# Patient Record
Sex: FEMALE | Employment: OTHER | URBAN - METROPOLITAN AREA
[De-identification: names, ages, dates, MRNs, and addresses within clinical notes are randomized per-mention and may not be internally consistent; named-entity substitution may affect disease eponyms.]

---

## 2017-02-13 NOTE — PROGRESS NOTES
"  SUBJECTIVE:                                                    Cristobal Darnell is a 41 year old female who presents to clinic today for the following health issues:    Rash     Onset: 1 year    Description:   Location: abdomen   Character: round, raised  Itching (Pruritis): YES    Progression of Symptoms:  same    Accompanying Signs & Symptoms:  Fever: no   Body aches or joint pain: no   Sore throat symptoms: no   Recent cold symptoms: no    History:   Previous similar rash: Yes     Precipitating factors:   Exposure to similar rash: no   New exposures: None   Recent travel: no     Alleviating factors:       Therapies Tried and outcome: hydrocortisone - family prescription     She has seen an outside head and neck clinic for TMJ pain, for which an MRI was performed and therapy recommended. She requests referral to a location closer to home.     She takes bupropion for chronic depression, and wants to continue taking multiple vitamin supplements by prescription.     ROS:  CONSTITUTIONAL: obesity   INTEGUMENTARY/SKIN: see HPI   MUSCULOSKELETAL: history of recurrent low back pain   PSYCHIATRIC: see above     OBJECTIVE:                                                    /73 (BP Location: Left arm, Patient Position: Chair, Cuff Size: Adult Regular)  Pulse 75  Temp 97.7  F (36.5  C) (Oral)  Ht 4' 11.75\" (1.518 m)  Wt 171 lb (77.6 kg)  SpO2 100%  Breastfeeding? No  BMI 33.68 kg/m2  Body mass index is 33.68 kg/(m^2).  GENERAL: alert, no distress and obese  MS: extremities normal- no gross deformities noted  SKIN: red fine pink papular rash on right lower abdomen looks more like an abrasion; no erythema in crease of pannus   PSYCH: mentation appears normal, affect normal/bright    Diagnostic Test Results:  Lab Results   Component Value Date    HDL 34 10/30/2013           ASSESSMENT/PLAN:                                                    (M26.609) TMJ (temporomandibular joint disorder)  (primary encounter " diagnosis)  Plan: [facility] PT, HAND, AND CHIROPRACTIC REFERRAL        Continue use of night splint as needed and stress management     (L30.4) Intertrigo  Comment: examination is unusual but chronicity is not consistent with appearance of abrasion   Plan: triamcinolone (KENALOG) 0.1 % cream,         DERMATOLOGY REFERRAL           (F32.1) Major depressive disorder, single episode, moderate degree (H)  Plan: buPROPion (WELLBUTRIN XL) 300 MG 24 hr tablet          (E53.8) Vitamin B 12 deficiency  Plan: cyanocobalamin (VITAMIN  B-12) 1000 MCG tablet          (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 She may purchase over-the-counter     (E63.9) Dietary deficiency  Plan: cholecalciferol (VITAMIN D3) 1000 UNIT tablet,         Multiple Vitamin (DAILY CHRISTIAN) TABS        Recommended healthy diet in ariella of MVI but patient prefers prescription; may purchase over-the-counter       See Patient Instructions    Shania Yeung MD  [facility]

## 2017-02-14 ENCOUNTER — OFFICE VISIT (OUTPATIENT)
Dept: FAMILY MEDICINE | Facility: CLINIC | Age: 42
End: 2017-02-14
Payer: COMMERCIAL

## 2017-02-14 VITALS
TEMPERATURE: 97.7 F | BODY MASS INDEX: 33.57 KG/M2 | SYSTOLIC BLOOD PRESSURE: 106 MMHG | WEIGHT: 171 LBS | OXYGEN SATURATION: 100 % | HEART RATE: 75 BPM | HEIGHT: 60 IN | DIASTOLIC BLOOD PRESSURE: 73 MMHG

## 2017-02-14 DIAGNOSIS — E53.8 VITAMIN B 12 DEFICIENCY: ICD-10-CM

## 2017-02-14 DIAGNOSIS — E63.9 DIETARY DEFICIENCY: ICD-10-CM

## 2017-02-14 DIAGNOSIS — E78.6 HDL LIPOPROTEIN DEFICIENCY: ICD-10-CM

## 2017-02-14 DIAGNOSIS — M26.609 TMJ (TEMPOROMANDIBULAR JOINT DISORDER): Primary | ICD-10-CM

## 2017-02-14 DIAGNOSIS — F32.1 MAJOR DEPRESSIVE DISORDER, SINGLE EPISODE, MODERATE DEGREE (H): ICD-10-CM

## 2017-02-14 DIAGNOSIS — L30.4 INTERTRIGO: ICD-10-CM

## 2017-02-14 PROCEDURE — 99213 OFFICE O/P EST LOW 20 MIN: CPT | Performed by: FAMILY MEDICINE

## 2017-02-14 RX ORDER — TRIAMCINOLONE ACETONIDE 1 MG/G
CREAM TOPICAL
Qty: 45 G | Refills: 1 | Status: SHIPPED | OUTPATIENT
Start: 2017-02-14 | End: 2018-08-09

## 2017-02-14 RX ORDER — BUPROPION HYDROCHLORIDE 300 MG/1
300 TABLET ORAL EVERY MORNING
Qty: 90 TABLET | Refills: 3 | Status: SHIPPED | OUTPATIENT
Start: 2017-02-14 | End: 2018-07-26

## 2017-02-14 RX ORDER — LANOLIN ALCOHOL/MO/W.PET/CERES
1000 CREAM (GRAM) TOPICAL DAILY
Qty: 90 TABLET | Refills: 3 | Status: SHIPPED | OUTPATIENT
Start: 2017-02-14 | End: 2018-07-26

## 2017-02-14 RX ORDER — CHLORAL HYDRATE 500 MG
CAPSULE ORAL
Qty: 90 EACH | Refills: 3 | Status: SHIPPED
Start: 2017-02-14 | End: 2018-08-09

## 2017-02-14 RX ORDER — MULTIVITAMIN
1 TABLET ORAL DAILY
Qty: 90 TABLET | Refills: 3 | Status: SHIPPED | OUTPATIENT
Start: 2017-02-14 | End: 2018-08-09

## 2017-02-14 ASSESSMENT — PATIENT HEALTH QUESTIONNAIRE - PHQ9: 5. POOR APPETITE OR OVEREATING: MORE THAN HALF THE DAYS

## 2017-02-14 ASSESSMENT — ANXIETY QUESTIONNAIRES
3. WORRYING TOO MUCH ABOUT DIFFERENT THINGS: MORE THAN HALF THE DAYS
5. BEING SO RESTLESS THAT IT IS HARD TO SIT STILL: MORE THAN HALF THE DAYS
6. BECOMING EASILY ANNOYED OR IRRITABLE: MORE THAN HALF THE DAYS
7. FEELING AFRAID AS IF SOMETHING AWFUL MIGHT HAPPEN: MORE THAN HALF THE DAYS
1. FEELING NERVOUS, ANXIOUS, OR ON EDGE: MORE THAN HALF THE DAYS
GAD7 TOTAL SCORE: 14
2. NOT BEING ABLE TO STOP OR CONTROL WORRYING: MORE THAN HALF THE DAYS

## 2017-02-14 NOTE — MR AVS SNAPSHOT
After Visit Summary   2/14/2017    Cristobal Darnell    MRN: [id number]           Patient Information     Date Of Birth          1975        Visit Information        Provider Department      2/14/2017 6:20 PM Shania Yeung MD [facility]        Today's Diagnoses     TMJ (temporomandibular joint disorder)    -  1    Intertrigo        Major depressive disorder, single episode, moderate degree (H)        Vitamin B 12 deficiency        HDL lipoprotein deficiency        Dietary deficiency          Care Instructions    [facility]    If you have any questions regarding to your visit please contact your care team:       Team Red:   Clinic Hours Telephone Number   Dr. Shania Hernandez, NP   7am-7pm  Monday - Thursday   7am-5pm  Fridays  [phone]  (Appointment scheduling available 24/7)    Questions about your visit?   Team Line  [phone]   [facility] [place] [facility] - 11am-9pm Monday-Friday Saturday-Sunday- 9am-5pm   [place] - 5pm-9pm Monday-Friday Saturday-Sunday- 9am-5pm  [phone] - [facility]  [phone] - [place]       What options do I have for visits at the clinic other than the traditional office visit?  To expand how we care for you, many of our providers are utilizing electronic visits (e-visits) and telephone visits, when medically appropriate, for interactions with their patients rather than a visit in the clinic.   We also offer nurse visits for many medical concerns. Just like any other service, we will bill your insurance company for this type of visit based on time spent on the phone with your provider. Not all insurance companies cover these visits. Please check with your medical insurance if this type of visit is covered. You will be responsible for any charges that are not paid by your insurance.      E-visits via Crunchbutton:  generally incur a $35.00 fee.  Telephone  visits:  Time spent on the phone: *charged based on time that is spent on the phone in increments of 10 minutes. Estimated cost:   5-10 mins $30.00   11-20 mins. $59.00   21-30 mins. $85.00     Use RentPostt (secure email communication and access to your chart) to send your primary care provider a message or make an appointment. Ask someone on your Team how to sign up for [email or web address].  For a Price Quote for your services, please call our [employer] Price Line at [phone].      As always, Thank you for trusting us with your health care needs!  Sandie ALVAREZ MA          Follow-ups after your visit        Additional Services     DERMATOLOGY REFERRAL       Your provider has referred you to: [facility]: [facility] Gisselle Ramírez [phone]   [email or web address]    Please be aware that coverage of these services is subject to the terms and limitations of your health insurance plan.  Call member services at your health plan with any benefit or coverage questions.      Please bring the following with you to your appointment:    (1) Any X-Rays, CTs or MRIs which have been performed.  Contact the facility where they were done to arrange for  prior to your scheduled appointment.    (2) List of current medications  (3) This referral request   (4) Any documents/labs given to you for this referral            [facility] PT, HAND, AND CHIROPRACTIC REFERRAL       **This order will print in the [facility] Scheduling Office**    Physical Therapy, Hand Therapy and Chiropractic Care are available through:    *[facility]  *[facility]  *[facility]    Call one number to schedule at any of the above locations: [phone].    Your provider has referred you to: As Indicated:      Indication/Reason for Referral: Temporomandibular Dysfunction  Onset of Illness:    Therapy Orders: Evaluate and Treat  Special Programs: None  Special Request: None    Sue Rogers      Additional  "Comments for the Therapist or Chiropractor:      Please be aware that coverage of these services is subject to the terms and limitations of your health insurance plan.  Call member services at your health plan with any benefit or coverage questions.      Please bring the following to your appointment:    *Your personal calendar for scheduling future appointments  *Comfortable clothing                  Follow-up notes from your care team     Return for physical.      Who to contact     If you have questions or need follow up information about today's clinic visit or your schedule please contact [facility] directly at [phone].  Normal or non-critical lab and imaging results will be communicated to you by AppFirsthart, letter or phone within 4 business days after the clinic has received the results. If you do not hear from us within 7 days, please contact the clinic through [email or web address] or phone. If you have a critical or abnormal lab result, we will notify you by phone as soon as possible.  Submit refill requests through [employer] or call your pharmacy and they will forward the refill request to us. Please allow 3 business days for your refill to be completed.          Additional Information About Your Visit        [employer] Information     [employer] lets you send messages to your doctor, view your test results, renew your prescriptions, schedule appointments and more. To sign up, go to [email or web address] . Click on \"Log in\" on the left side of the screen, which will take you to the Welcome page. Then click on \"Sign up Now\" on the right side of the page.     You will be asked to enter the access code listed below, as well as some personal information. Please follow the directions to create your username and password.     Your access code is: [email or web address]  Expires: 5/15/2017  6:55 PM     Your access code will  in 90 days. If you need help or a new code, please call your [place] clinic or [phone].   " "     Care EveryWhere ID     This is your Care EveryWhere ID. This could be used by other organizations to access your [facility] medical records  [id number]        Your Vitals Were     Pulse Temperature Height Pulse Oximetry Breastfeeding? BMI (Body Mass Index)    75 97.7  F (36.5  C) (Oral) 4' 11.75\" (1.518 m) 100% No 33.68 kg/m2       Blood Pressure from Last 3 Encounters:   02/14/17 106/73   07/21/16 125/77   06/07/16 112/70    Weight from Last 3 Encounters:   02/14/17 171 lb (77.6 kg)   07/21/16 162 lb 12.8 oz (73.8 kg)   06/07/16 163 lb (73.9 kg)              We Performed the Following     DERMATOLOGY REFERRAL     [facility] PT, HAND, AND CHIROPRACTIC REFERRAL          Today's Medication Changes          These changes are accurate as of: 2/14/17  6:55 PM.  If you have any questions, ask your nurse or doctor.               Start taking these medicines.        Dose/Directions    triamcinolone 0.1 % cream   Commonly known as:  KENALOG   Used for:  Intertrigo   Started by:  Shania Yeung MD        Apply to affected area sparingly twice daily as needed   Quantity:  45 g   Refills:  1         These medicines have changed or have updated prescriptions.        Dose/Directions    cholecalciferol 1000 UNIT tablet   Commonly known as:  vitamin D   This may have changed:  See the new instructions.   Used for:  Dietary deficiency   Changed by:  Shania Yeung MD        Dose:  1000 Units   Take 1 tablet (1,000 Units) by mouth daily   Quantity:  90 tablet   Refills:  3       cyanocobalamin 1000 MCG tablet   Commonly known as:  vitamin  B-12   This may have changed:  See the new instructions.   Used for:  Vitamin B 12 deficiency   Changed by:  Shania Yeung MD        Dose:  1000 mcg   Take 1 tablet (1,000 mcg) by mouth daily   Quantity:  90 tablet   Refills:  3       DAILY CHRISTIAN Tabs   This may have changed:  See the new instructions.   Used for:  Dietary deficiency   Changed by:  Shania Yeung MD        Dose:  1 tablet "   Take 1 tablet by mouth daily   Quantity:  90 tablet   Refills:  3       fish oil-omega-3 fatty acids 1000 MG capsule   This may have changed:  See the new instructions.   Used for:  HDL lipoprotein deficiency   Changed by:  Shania Yeung MD        TAKE ONE CAPSULE BY MOUTH ONCE DAILY   Quantity:  90 each   Refills:  3            Where to get your medicines      These medications were sent to [facility] - [address]  [address] [facility] [address]     Phone:  [phone]     buPROPion 300 MG 24 hr tablet    cholecalciferol 1000 UNIT tablet    cyanocobalamin 1000 MCG tablet    DAILY CHRISTIAN Tabs    fish oil-omega-3 fatty acids 1000 MG capsule    triamcinolone 0.1 % cream                Primary Care Provider Office Phone # Fax #    Shania Yeung [phone]       [address] [address]        Thank you!     Thank you for choosing [facility]  for your care. Our goal is always to provide you with excellent care. Hearing back from our patients is one way we can continue to improve our services. Please take a few minutes to complete the written survey that you may receive in the mail after your visit with us. Thank you!             Your Updated Medication List - Protect others around you: Learn how to safely use, store and throw away your medicines at www.disposemymeds.org.          This list is accurate as of: 2/14/17  6:55 PM.  Always use your most recent med list.                   Brand Name Dispense Instructions for use    buPROPion 300 MG 24 hr tablet    WELLBUTRIN XL    90 tablet    Take 1 tablet (300 mg) by mouth every morning       cholecalciferol 1000 UNIT tablet    vitamin D    90 tablet    Take 1 tablet (1,000 Units) by mouth daily       cyanocobalamin 1000 MCG tablet    vitamin  B-12    90 tablet    Take 1 tablet (1,000 mcg) by mouth daily       DAILY CHRISTIAN Tabs     90 tablet     Take 1 tablet by mouth daily       fish oil-omega-3 fatty acids 1000 MG capsule     90 each    TAKE ONE CAPSULE BY MOUTH ONCE DAILY       naproxen 500 MG tablet    NAPROSYN    60 tablet    Take 1 tablet (500 mg) by mouth 2 times daily as needed for moderate pain       triamcinolone 0.1 % cream    KENALOG    45 g    Apply to affected area sparingly twice daily as needed

## 2017-02-15 ASSESSMENT — PATIENT HEALTH QUESTIONNAIRE - PHQ9: SUM OF ALL RESPONSES TO PHQ QUESTIONS 1-9: 9

## 2017-02-15 ASSESSMENT — ANXIETY QUESTIONNAIRES: GAD7 TOTAL SCORE: 14

## 2017-02-15 NOTE — NURSING NOTE
"Chief Complaint   Patient presents with     Derm Problem       Initial /73 (BP Location: Left arm, Patient Position: Chair, Cuff Size: Adult Regular)  Pulse 75  Temp 97.7  F (36.5  C) (Oral)  Ht 4' 11.75\" (1.518 m)  Wt 171 lb (77.6 kg)  SpO2 100%  Breastfeeding? No  BMI 33.68 kg/m2 Estimated body mass index is 33.68 kg/(m^2) as calculated from the following:    Height as of this encounter: 4' 11.75\" (1.518 m).    Weight as of this encounter: 171 lb (77.6 kg).  Medication Reconciliation: complete   Sandie ALVAREZ MA      "

## 2017-02-15 NOTE — PATIENT INSTRUCTIONS
[facility]    If you have any questions regarding to your visit please contact your care team:       Team Red:   Clinic Hours Telephone Number   Dr. Shania Hernandez, NP   7am-7pm  Monday - Thursday   7am-5pm  Fridays  [phone]  (Appointment scheduling available 24/7)    Questions about your visit?   Team Line  [phone]   Urgent Care - [place] - 11am-9pm Monday-Friday Saturday-Sunday- 9am-5pm   [place] - 5pm-9pm Monday-Friday Saturday-Sunday- 9am-5pm  [phone] - [place]   [phone] - [place]       What options do I have for visits at the clinic other than the traditional office visit?  To expand how we care for you, many of our providers are utilizing electronic visits (e-visits) and telephone visits, when medically appropriate, for interactions with their patients rather than a visit in the clinic.   We also offer nurse visits for many medical concerns. Just like any other service, we will bill your insurance company for this type of visit based on time spent on the phone with your provider. Not all insurance companies cover these visits. Please check with your medical insurance if this type of visit is covered. You will be responsible for any charges that are not paid by your insurance.      E-visits via Convozine:  generally incur a $35.00 fee.  Telephone visits:  Time spent on the phone: *charged based on time that is spent on the phone in increments of 10 minutes. Estimated cost:   5-10 mins $30.00   11-20 mins. $59.00   21-30 mins. $85.00     Use DxNAt (secure email communication and access to your chart) to send your primary care provider a message or make an appointment. Ask someone on your Team how to sign up for Convozine.  For a Price Quote for your services, please call our [employer] Price Line at [phone].      As always, Thank you for trusting us with your health care needs!  Sandie ALVAREZ  MA

## 2017-02-16 ENCOUNTER — THERAPY VISIT (OUTPATIENT)
Dept: PHYSICAL THERAPY | Facility: CLINIC | Age: 42
End: 2017-02-16
Payer: COMMERCIAL

## 2017-02-16 DIAGNOSIS — M26.609 TMJ (TEMPOROMANDIBULAR JOINT DISORDER): Primary | ICD-10-CM

## 2017-02-16 DIAGNOSIS — M79.18 MYOFASCIAL MUSCLE PAIN: ICD-10-CM

## 2017-02-16 DIAGNOSIS — M54.2 CERVICALGIA: ICD-10-CM

## 2017-02-16 PROCEDURE — 97140 MANUAL THERAPY 1/> REGIONS: CPT | Mod: GP | Performed by: PHYSICAL THERAPIST

## 2017-02-16 PROCEDURE — 97161 PT EVAL LOW COMPLEX 20 MIN: CPT | Mod: GP | Performed by: PHYSICAL THERAPIST

## 2017-02-16 NOTE — MR AVS SNAPSHOT
After Visit Summary   2/16/2017    Cristobal Darnell    MRN: [id number]           Patient Information     Date Of Birth          1975        Visit Information        Provider Department      2/16/2017 1:30 PM Cornel Falcon, PT [facility]        Today's Diagnoses     TMJ (temporomandibular joint disorder)    -  1    Cervicalgia        Myofascial muscle pain           Follow-ups after your visit        Your next 10 appointments already scheduled     Feb 20, 2017  2:40 PM CST   HEMA Extremity with Tarun Scanlon, PT   [facility] [facility] ([facility] [place])    [address]  [address]  [place] [address]   [phone]            Feb 24, 2017  2:50 PM CST   HEMA Extremity with Tarun Scanlon PT   [facility] ([facility] [place])    [address]  [address]  [place] [address]   [phone]            Feb 27, 2017  2:40 PM CST   HEMA Extremity with Tarun Scanlon, PT   [facility] ([facility] [place])    [address]  [address]  [place] [address]   [phone]            Mar 02, 2017  1:50 PM CST   HEMA Extremity with Tarun Scanlon PT   [facility] ([facility] [place])    [address]  [address]  [place] [address]   [phone]              Who to contact     If you have questions or need follow up information about today's clinic visit or your schedule please contact [facility] directly at [phone].  Normal or non-critical lab and imaging results will be communicated to you by MyChart, letter or phone within 4 business days after the clinic has received the results. If you do not hear from us within 7 days, please contact the clinic through MyChart or phone. If you have a critical or abnormal lab result, we will notify you by phone as soon as  "possible.  Submit refill requests through [employer] or call your pharmacy and they will forward the refill request to us. Please allow 3 business days for your refill to be completed.          Additional Information About Your Visit        [email or web address] Information     [employer] lets you send messages to your doctor, view your test results, renew your prescriptions, schedule appointments and more. To sign up, go to [email or web address] . Click on \"Log in\" on the left side of the screen, which will take you to the Welcome page. Then click on \"Sign up Now\" on the right side of the page.     You will be asked to enter the access code listed below, as well as some personal information. Please follow the directions to create your username and password.     Your access code is: [email or web address]  Expires: 5/15/2017  6:55 PM     Your access code will  in 90 days. If you need help or a new code, please call your [place] clinic or [phone].        Care EveryWhere ID     This is your Care EveryWhere ID. This could be used by other organizations to access your [facility] medical records  [id number]         Blood Pressure from Last 3 Encounters:   17 106/73   16 125/77   16 112/70    Weight from Last 3 Encounters:   17 77.6 kg (171 lb)   16 73.8 kg (162 lb 12.8 oz)   16 73.9 kg (163 lb)              We Performed the Following     HC PT EVAL, LOW COMPLEXITY     HEMA INITIAL EVAL REPORT     MANUAL THER TECH,1+REGIONS,EA 15 MIN        Primary Care Provider Office Phone # Fax #    Shania Yeung [phone]       [address] [address]        Thank you!     Thank you for choosing [facility]  for your care. Our goal is always to provide you with excellent care. Hearing back from our patients is one way we can continue to improve our services. Please take a few minutes to complete the written survey that you " may receive in the mail after your visit with us. Thank you!             Your Updated Medication List - Protect others around you: Learn how to safely use, store and throw away your medicines at www.disposemymeds.org.          This list is accurate as of: 2/16/17  4:08 PM.  Always use your most recent med list.                   Brand Name Dispense Instructions for use    buPROPion 300 MG 24 hr tablet    WELLBUTRIN XL    90 tablet    Take 1 tablet (300 mg) by mouth every morning       cholecalciferol 1000 UNIT tablet    vitamin D    90 tablet    Take 1 tablet (1,000 Units) by mouth daily       cyanocobalamin 1000 MCG tablet    vitamin  B-12    90 tablet    Take 1 tablet (1,000 mcg) by mouth daily       DAILY CHRISTIAN Tabs     90 tablet    Take 1 tablet by mouth daily       fish oil-omega-3 fatty acids 1000 MG capsule     90 each    TAKE ONE CAPSULE BY MOUTH ONCE DAILY       naproxen 500 MG tablet    NAPROSYN    60 tablet    Take 1 tablet (500 mg) by mouth 2 times daily as needed for moderate pain       triamcinolone 0.1 % cream    KENALOG    45 g    Apply to affected area sparingly twice daily as needed

## 2017-02-16 NOTE — PROGRESS NOTES
"[facility] Initial Evaluation    Subjective:    Cristobal Darnell is a 41 year old female with a TMJ right condition.  Condition occurred with:  Insidious onset.  Condition occurred: for unknown reasons.  This is a chronic condition  5 years ago (or more) without injury. 1-2 years of limited opening. Date of MD order 2/14/17.    Patient reports pain:  TMJ right.  Radiates to:  Head (right side of neck and arm).  Pain is described as aching and is constant and reported as 7/10.  Associated symptoms:  Stiffness/limited opening (words \"sound different\"; \"extra skin\" on the inner cheek due to the ; right sided neck pain and headache). Pain is the same all the time.  Symptoms are exacerbated by chewing, clenching and stress (stretches from original referring MD; neck and arm pain are with stress or increased work/movement of arm) and relieved by heat (continued use of the exercises given by previous PT).  Since onset symptoms are unchanged (was improved when she was doing exercises).  Special tests:  CT scan.  Previous treatment includes physical therapy.  There was mild (but not sustained because she stopped doing exercises) improvement following previous treatment.  General health as reported by patient is fair.  Pertinent medical history includes:  None.  Medical allergies: no.  Other surgeries include:  None reported.  Current medications:  Other (vitamins).  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  Patient is working in normal job without restrictions.  Primary job tasks include:  Prolonged sitting and repetitive tasks.    Barriers include:  None as reported by the patient.    Red flags:  None as reported by the patient.                      Objective:    Standing Alignment:    Cervical/Thoracic:  Forward head  Shoulder/UE:  Rounded shoulders                                                        "   TMJ Evaluation  ROM:     AROM Cervical:    Flexion: WNL Overpressure: Pain:  Extension: min to mod loss Overpressure: Pain:  Left Side Bend: WNL Overpressure: Pain:  Right Side Bend: min loss Overpressure: Pain:  Left Rotation: min loss with lateral tilt at end range Overpressure: Pain:  Right Rotation: min loss Overpressure: Pain:  AROM TMJ:  Openin mm      Left Laterotrusion:  5 mm     Right Laterotrusion:  10 mm    Protrusion: 1 mm    Retrusion:  WNL    Associated Findings: Headaches:  Temporal (right)  Parafunctional Habits:    Bruxism:  Yes - per dentist  Mandibular Habits:  Resting head on hand  Chewing/Biting Nails:  No    Clenching:  Maybe - at least not relaxed    Caffeine:  Yes - tea 4 cups per day    Aerobic Exercise:  Not recently - planning on starting  Sleep Quality:  Good - R side with R arm under head  Neurological:  neuro exam not indicated and deferred      Previous Interventions:    Intraoral Appliances:  Night splint for the last 4 months    Palpation:      Left side tenderness not present at: Deep Masseter; Temporalis; Medial Pterygoid or Lateral Pterygoid  Right side tenderness present at:  Deep Masseter; Temporalis; Medial Pterygoid and Lateral Pterygoid    Movement Characteristics:        Deviations:  Straight line to the right      TMJ Findings:  Tmj findings: joint mobility normal on the left, unable to test on the right d/t pain/symptom irritability; MMT 5/5 all directions.      Tongue Scalloping:  Yes      Capsule Palpation:    Right side tenderness present at:  Lateral Capsule; Superior Capsule and Posterior Capsule                General     ROS    Assessment/Plan:      Patient is a 41 year old female with right side TMJ complaints.    Patient has the following significant findings with corresponding treatment plan.                Diagnosis 1:  TMD - R disc displacement without reduction; myofascial pain    Pain -  hot/cold therapy, manual therapy, education, directional  preference exercise and home program  Decreased ROM/flexibility - manual therapy, therapeutic exercise and home program  Decreased joint mobility - manual therapy and therapeutic exercise  Impaired muscle performance - neuro re-education and home program  Impaired posture - neuro re-education and home program    Therapy Evaluation Codes:   1) History comprised of:   Personal factors that impact the plan of care:      Past/current experiences.    Comorbidity factors that impact the plan of care are:      None.     Medications impacting care: None.  2) Examination of Body Systems comprised of:   Body structures and functions that impact the plan of care:      Cervical spine and TMJ.   Activity limitations that impact the plan of care are:      eating, talking, concentration.  3) Clinical presentation characteristics are:   Stable/Uncomplicated.  4) Decision-Making    Low complexity using standardized patient assessment instrument and/or measureable assessment of functional outcome.  Cumulative Therapy Evaluation is: Low complexity.    Previous and current functional limitations:  (See Goal Flow Sheet for this information)    Short term and Long term goals: (See Goal Flow Sheet for this information)     Communication ability:  Patient appears to be able to clearly communicate and understand verbal and written communication and follow directions correctly.  Treatment Explanation - The following has been discussed with the patient:   RX ordered/plan of care  Anticipated outcomes  Possible risks and side effects  This patient would benefit from PT intervention to resume normal activities.   Rehab potential is fair given the chronic nature of her symptoms and lack of improvement with previous PT treatment.    Frequency:  2 X week, once daily  Duration:  for 2 weeks tapering to 1 X a week over 4 weeks  Discharge Plan:  Achieve all LTG.  Independent in home treatment program.  Reach maximal therapeutic benefit.    Please  refer to the daily flowsheet for treatment today, total treatment time and time spent performing 1:1 timed codes.

## 2017-02-20 NOTE — PROGRESS NOTES
The risks, perceived benefits and potential complications (including but not limited to: dizziness/drowsiness, bleeding, infection, pain, damage to adjacent structures, failure to relieve symptoms) of dry needling were discussed with the patient. Questions were addressed and answered.  The patient elected to proceed. Verbal informed consent was obtained.

## 2017-02-24 ENCOUNTER — THERAPY VISIT (OUTPATIENT)
Dept: PHYSICAL THERAPY | Facility: CLINIC | Age: 42
End: 2017-02-24
Payer: COMMERCIAL

## 2017-02-24 DIAGNOSIS — M79.18 MYOFASCIAL MUSCLE PAIN: ICD-10-CM

## 2017-02-24 DIAGNOSIS — M54.2 CERVICALGIA: ICD-10-CM

## 2017-02-24 PROCEDURE — 97140 MANUAL THERAPY 1/> REGIONS: CPT | Mod: GP | Performed by: PHYSICAL THERAPIST

## 2017-02-24 NOTE — MR AVS SNAPSHOT
"              After Visit Summary   2/24/2017    Cristobal Darnell    MRN: [id number]           Patient Information     Date Of Birth          1975        Visit Information        Provider Department      2/24/2017 2:50 PM Tarun Scanlon PT [facility] [place] [facility]        Today's Diagnoses     Cervicalgia        Myofascial muscle pain           Follow-ups after your visit        Your next 10 appointments already scheduled     Feb 27, 2017  2:40 PM CST   HEMA Extremity with Tarun Scanlon PT   [facility] [place] [facility] ([facility] [place])    [address]  [address]  [place] [address]   [phone]            Mar 02, 2017  1:50 PM CST   HEMA Extremity with Tarun Scanlon PT   [facility] [place] [facility] ([facility] [place])    [address]  [place] [address]   [phone]              Who to contact     If you have questions or need follow up information about today's clinic visit or your schedule please contact [facility] [place] [facility] directly at [phone].  Normal or non-critical lab and imaging results will be communicated to you by BenchPrephart, letter or phone within 4 business days after the clinic has received the results. If you do not hear from us within 7 days, please contact the clinic through BenchPrephart or phone. If you have a critical or abnormal lab result, we will notify you by phone as soon as possible.  Submit refill requests through Tunepresto or call your pharmacy and they will forward the refill request to us. Please allow 3 business days for your refill to be completed.          Additional Information About Your Visit        MyChart Information     Tunep[employer]o lets you send messages to your doctor, view your test results, renew your prescriptions, schedule appointments and more. To sign up, go to [email or web address] . Click on \"Log in\" on the left side of the screen, which will " "take you to the Welcome page. Then click on \"Sign up Now\" on the right side of the page.     You will be asked to enter the access code listed below, as well as some personal information. Please follow the directions to create your username and password.     Your access code is: [email or web address]  Expires: 5/15/2017  6:55 PM     Your access code will  in 90 days. If you need help or a new code, please call your [facility] clinic or [phone].        Care EveryWhere ID     This is your Care EveryWhere ID. This could be used by other organizations to access your [facility] medical records  [id number]         Blood Pressure from Last 3 Encounters:   17 106/73   16 125/77   16 112/70    Weight from Last 3 Encounters:   17 77.6 kg (171 lb)   16 73.8 kg (162 lb 12.8 oz)   16 73.9 kg (163 lb)              We Performed the Following     MANUAL THER TECH,1+REGIONS,EA 15 MIN        Primary Care Provider Office Phone # Fax #    Shania Yeung [phone]       [address] [address]        Thank you!     Thank you for choosing [facility]  for your care. Our goal is always to provide you with excellent care. Hearing back from our patients is one way we can continue to improve our services. Please take a few minutes to complete the written survey that you may receive in the mail after your visit with us. Thank you!             Your Updated Medication List - Protect others around you: Learn how to safely use, store and throw away your medicines at www.disposemymeds.org.          This list is accurate as of: 17  3:26 PM.  Always use your most recent med list.                   Brand Name Dispense Instructions for use    buPROPion 300 MG 24 hr tablet    WELLBUTRIN XL    90 tablet    Take 1 tablet (300 mg) by mouth every morning       cholecalciferol 1000 UNIT tablet    vitamin D    90 tablet    Take 1 " tablet (1,000 Units) by mouth daily       cyanocobalamin 1000 MCG tablet    vitamin  B-12    90 tablet    Take 1 tablet (1,000 mcg) by mouth daily       DAILY CHRISTIAN Tabs     90 tablet    Take 1 tablet by mouth daily       fish oil-omega-3 fatty acids 1000 MG capsule     90 each    TAKE ONE CAPSULE BY MOUTH ONCE DAILY       naproxen 500 MG tablet    NAPROSYN    60 tablet    Take 1 tablet (500 mg) by mouth 2 times daily as needed for moderate pain       triamcinolone 0.1 % cream    KENALOG    45 g    Apply to affected area sparingly twice daily as needed

## 2017-02-24 NOTE — PROGRESS NOTES
Subjective:    HPI                    Objective:    System    Physical Exam    General     ROS    Assessment/Plan:      SUBJECTIVE  Subjective: Felt better after first needling treatment.   Current Pain level: 6/10   Changes in function:  None     Adverse reaction to treatment or activity:  None    OBJECTIVE  Objective: Mouth opening- still has a large deviation to the R and reports a heavy feeling     ASSESSMENT  Rabab continues to require intervention to meet STG and LTG's: PT  Patient is progressing as expected.  Response to therapy has shown an improvement in  pain level  Progress made towards STG/LTG?  None    PLAN  Continue DN for a few more visits    PTA/ATC plan:  N/A    Please refer to the daily flowsheet for treatment today, total treatment time and time spent performing 1:1 timed codes.

## 2017-03-02 ENCOUNTER — THERAPY VISIT (OUTPATIENT)
Dept: PHYSICAL THERAPY | Facility: CLINIC | Age: 42
End: 2017-03-02

## 2017-03-02 DIAGNOSIS — M54.2 CERVICALGIA: ICD-10-CM

## 2017-03-02 DIAGNOSIS — M79.18 MYOFASCIAL MUSCLE PAIN: ICD-10-CM

## 2017-03-02 NOTE — PROGRESS NOTES
Subjective:    HPI                    Objective:    System    Physical Exam    General     ROS    Assessment/Plan:      SUBJECTIVE  Subjective: Was more sore after last treatment.  Especially at the area where the needle was for the lateral pterygoid   Current Pain level: 6/10   Changes in function:  None     Adverse reaction to treatment or activity:  None    OBJECTIVE  Objective: Still no change in deviation to R with mouth opening.  Pt could control it with 2 fingers on chin and reported less pain     ASSESSMENT  Rabab continues to require intervention to meet STG and LTG's: PT  No change of symptoms has been noted this week.  Response to therapy has shown an improvement in  pain level  Progress made towards STG/LTG?  None    PLAN  Continue current treatment plan until patient demonstrates readiness to progress to higher level exercises.    PTA/ATC plan:  N/A    Please refer to the daily flowsheet for treatment today, total treatment time and time spent performing 1:1 timed codes.

## 2017-03-02 NOTE — MR AVS SNAPSHOT
"              After Visit Summary   3/2/2017    Cristobal Darnell    MRN: [id number]           Patient Information     Date Of Birth          1975        Visit Information        Provider Department      3/2/2017 1:50 PM Tarun Scanlon, PT [facility] [place] [facility]        Today's Diagnoses     Cervicalgia        Myofascial muscle pain           Follow-ups after your visit        Your next 10 appointments already scheduled     Mar 07, 2017  9:00 AM CST   HEMA Extremity with Tarun Scanlon PT   [facility] [place] [facility] ([facility] [place]    [address]  [place] [address]   [phone]            Mar 14, 2017 10:10 AM CDT   HEMA Extremity with Cornel Falcon PT   [facility] [place] [facility] ([facility] [place])    [address]  [place] [address]   [phone]              Who to contact     If you have questions or need follow up information about today's clinic visit or your schedule please contact [facility] [place] [facility] directly at [phone].  Normal or non-critical lab and imaging results will be communicated to you by skyrockithart, letter or phone within 4 business days after the clinic has received the results. If you do not hear from us within 7 days, please contact the clinic through skyrockithart or phone. If you have a critical or abnormal lab result, we will notify you by phone as soon as possible.  Submit refill requests through [employer] or call your pharmacy and they will forward the refill request to us. Please allow 3 business days for your refill to be completed.          Additional Information About Your Visit        MyChart Information     [employer] lets you send messages to your doctor, view your test results, renew your prescriptions, schedule appointments and more. To sign up, go to [email or web address] . Click on \"Log in\" on the left side of the screen, which will take you to " "the Welcome page. Then click on \"Sign up Now\" on the right side of the page.     You will be asked to enter the access code listed below, as well as some personal information. Please follow the directions to create your username and password.     Your access code is: [email or web address]  Expires: 5/15/2017  6:55 PM     Your access code will  in 90 days. If you need help or a new code, please call your [facility] or [phone].        Care EveryWhere ID     This is your Care EveryWhere ID. This could be used by other organizations to access your [facility] medical records  [id number]         Blood Pressure from Last 3 Encounters:   17 106/73   16 125/77   16 112/70    Weight from Last 3 Encounters:   17 77.6 kg (171 lb)   16 73.8 kg (162 lb 12.8 oz)   16 73.9 kg (163 lb)              Today, you had the following     No orders found for display       Primary Care Provider Office Phone # Fax #    Shania Yeung [phone]       [address] [address]        Thank you!     Thank you for choosing [facility]   for your care. Our goal is always to provide you with excellent care. Hearing back from our patients is one way we can continue to improve our services. Please take a few minutes to complete the written survey that you may receive in the mail after your visit with us. Thank you!             Your Updated Medication List - Protect others around you: Learn how to safely use, store and throw away your medicines at www.disposemymeds.org.          This list is accurate as of: 3/2/17  2:28 PM.  Always use your most recent med list.                   Brand Name Dispense Instructions for use    buPROPion 300 MG 24 hr tablet    WELLBUTRIN XL    90 tablet    Take 1 tablet (300 mg) by mouth every morning       cholecalciferol 1000 UNIT tablet    vitamin D    90 tablet    Take 1 tablet (1,000 Units) " by mouth daily       cyanocobalamin 1000 MCG tablet    vitamin  B-12    90 tablet    Take 1 tablet (1,000 mcg) by mouth daily       DAILY CHRISTIAN Tabs     90 tablet    Take 1 tablet by mouth daily       fish oil-omega-3 fatty acids 1000 MG capsule     90 each    TAKE ONE CAPSULE BY MOUTH ONCE DAILY       naproxen 500 MG tablet    NAPROSYN    60 tablet    Take 1 tablet (500 mg) by mouth 2 times daily as needed for moderate pain       triamcinolone 0.1 % cream    KENALOG    45 g    Apply to affected area sparingly twice daily as needed

## 2017-04-11 ENCOUNTER — THERAPY VISIT (OUTPATIENT)
Dept: PHYSICAL THERAPY | Facility: CLINIC | Age: 42
End: 2017-04-11
Payer: MEDICAID

## 2017-04-11 DIAGNOSIS — M54.2 CERVICALGIA: ICD-10-CM

## 2017-04-11 DIAGNOSIS — M79.18 MYOFASCIAL MUSCLE PAIN: ICD-10-CM

## 2017-04-11 PROCEDURE — 97140 MANUAL THERAPY 1/> REGIONS: CPT | Mod: GP | Performed by: PHYSICAL THERAPIST

## 2017-04-11 NOTE — MR AVS SNAPSHOT
"              After Visit Summary   2017    Cristobal Darnell    MRN: [id number]           Patient Information     Date Of Birth          1975        Visit Information        Provider Department      2017 1:30 PM Tarun Scanlon PT [facility] [place] [facility]        Today's Diagnoses     Cervicalgia        Myofascial muscle pain           Follow-ups after your visit        Who to contact     If you have questions or need follow up information about today's clinic visit or your schedule please contact [facility] [place] [facility] directly at [phone].  Normal or non-critical lab and imaging results will be communicated to you by PCN Technologyhart, letter or phone within 4 business days after the clinic has received the results. If you do not hear from us within 7 days, please contact the clinic through PCN Technologyhart or phone. If you have a critical or abnormal lab result, we will notify you by phone as soon as possible.  Submit refill requests through Zoji or call your pharmacy and they will forward the refill request to us. Please allow 3 business days for your refill to be completed.          Additional Information About Your Visit        MyChart Information     Zoji lets you send messages to your doctor, view your test results, renew your prescriptions, schedule appointments and more. To sign up, go to [email or web address] . Click on \"Log in\" on the left side of the screen, which will take you to the Welcome page. Then click on \"Sign up Now\" on the right side of the page.     You will be asked to enter the access code listed below, as well as some personal information. Please follow the directions to create your username and password.     Your access code is: [email or web address]  Expires: 5/15/2017  7:55 PM     Your access code will  in 90 days. If you need help or a new code, please call your [facility] clinic or [phone].        Care EveryWhere ID     This is your Care " EveryWhere ID. This could be used by other organizations to access your [facility] medical records  [id number]         Blood Pressure from Last 3 Encounters:   02/14/17 106/73   07/21/16 125/77   06/07/16 112/70    Weight from Last 3 Encounters:   02/14/17 77.6 kg (171 lb)   07/21/16 73.8 kg (162 lb 12.8 oz)   06/07/16 73.9 kg (163 lb)              We Performed the Following     MANUAL THER TECH,1+REGIONS,EA 15 MIN        Primary Care Provider Office Phone # Fax #    Shania Yeung [phone]       [address] [address]        Thank you!     Thank you for choosing [facility]  for your care. Our goal is always to provide you with excellent care. Hearing back from our patients is one way we can continue to improve our services. Please take a few minutes to complete the written survey that you may receive in the mail after your visit with us. Thank you!             Your Updated Medication List - Protect others around you: Learn how to safely use, store and throw away your medicines at www.disposemymeds.org.          This list is accurate as of: 4/11/17  2:29 PM.  Always use your most recent med list.                   Brand Name Dispense Instructions for use    buPROPion 300 MG 24 hr tablet    WELLBUTRIN XL    90 tablet    Take 1 tablet (300 mg) by mouth every morning       cholecalciferol 1000 UNIT tablet    vitamin D    90 tablet    Take 1 tablet (1,000 Units) by mouth daily       cyanocobalamin 1000 MCG tablet    vitamin  B-12    90 tablet    Take 1 tablet (1,000 mcg) by mouth daily       DAILY CHRISTIAN Tabs     90 tablet    Take 1 tablet by mouth daily       fish oil-omega-3 fatty acids 1000 MG capsule     90 each    TAKE ONE CAPSULE BY MOUTH ONCE DAILY       naproxen 500 MG tablet    NAPROSYN    60 tablet    Take 1 tablet (500 mg) by mouth 2 times daily as needed for moderate pain       triamcinolone 0.1 % cream    KENALOG     45 g    Apply to affected area sparingly twice daily as needed

## 2017-04-11 NOTE — PROGRESS NOTES
Subjective:    HPI                    Objective:    System    Physical Exam    General     ROS    Assessment/Plan:      PROGRESS  REPORT    Progress reporting period is from 2/24/17 to 4/11/17.       SUBJECTIVE  Subjective: No change since she was here last.  Has not been using her night splint.  No reason given for large gap in PT attendance    Current Pain level: 6/10.     Initial Pain level: 8/10.   Changes in function:  Yes (See Goal flowsheet attached for changes in current functional level)  Adverse reaction to treatment or activity: None    OBJECTIVE  Objective: Mouth opening- 29 mm with deviation to R.  Increased tone in R mid cervical paraspinals     ASSESSMENT/PLAN  Updated problem list and treatment plan: Diagnosis 1:  TMD with associated neck pain  Pain -  manual therapy, self management, education and home program  Decreased ROM/flexibility - manual therapy, therapeutic exercise and home program  Decreased function - therapeutic activities and home program  Instability -  Therapeutic Exercise  Neuromuscular Re-education  home program  STG/LTGs have been met or progress has been made towards goals:  Yes (See Goal flow sheet completed today.)  Assessment of Progress: The patient's progress has slowed.  Pt needs to attend PT more regularly if she expects to see a change.  Also needs to follow through on HEP and use of night splint  Self Management Plans:  Patient has been instructed in a home treatment program.  Cristobal continues to require the following intervention to meet STG and LTG's:  PT    Recommendations:  This patient would benefit from continued therapy.     Frequency:  1 X week, once daily  Duration:  for 4 weeks        Please refer to the daily flowsheet for treatment today, total treatment time and time spent performing 1:1 timed codes.

## 2017-06-29 ENCOUNTER — TELEPHONE (OUTPATIENT)
Dept: FAMILY MEDICINE | Facility: CLINIC | Age: 42
End: 2017-06-29

## 2017-06-29 NOTE — TELEPHONE ENCOUNTER
Reason for call: iud removal    Patient called regarding (reason for call): appointment  Additional comments: Please call patient to schedule    Phone number to reach patient:  Home number on file [phone] (home)    Best Time:anytime      Can we leave a detailed message on this number?  YES

## 2017-07-01 ENCOUNTER — HEALTH MAINTENANCE LETTER (OUTPATIENT)
Age: 42
End: 2017-07-01

## 2017-07-05 NOTE — TELEPHONE ENCOUNTER
Spoke to patient.  She stated she has already scheduled an appointment with another provider.  Nancy Riley,

## 2017-07-06 NOTE — PROGRESS NOTES
This patient did not return to Physical Therapy to complete their plan of care, thus, full DC status is unknown. Please refer to the progress note dated 4/11/17 for discharge information.   This bout of care ranged from 2/16/17 to 4/11/17.  Discharge patient from PT at this time.

## 2017-07-13 ENCOUNTER — TRANSFERRED RECORDS (OUTPATIENT)
Dept: HEALTH INFORMATION MANAGEMENT | Facility: CLINIC | Age: 42
End: 2017-07-13

## 2017-07-13 LAB
HPV ABSTRACT: NORMAL
PAP-ABSTRACT: NORMAL

## 2017-09-25 ENCOUNTER — OFFICE VISIT (OUTPATIENT)
Dept: FAMILY MEDICINE | Facility: CLINIC | Age: 42
End: 2017-09-25
Payer: COMMERCIAL

## 2017-09-25 VITALS
SYSTOLIC BLOOD PRESSURE: 112 MMHG | HEART RATE: 78 BPM | WEIGHT: 176 LBS | BODY MASS INDEX: 34.55 KG/M2 | OXYGEN SATURATION: 98 % | HEIGHT: 60 IN | DIASTOLIC BLOOD PRESSURE: 70 MMHG | TEMPERATURE: 97.6 F

## 2017-09-25 DIAGNOSIS — Z23 NEED FOR PROPHYLACTIC VACCINATION AND INOCULATION AGAINST INFLUENZA: ICD-10-CM

## 2017-09-25 DIAGNOSIS — Z12.31 VISIT FOR SCREENING MAMMOGRAM: ICD-10-CM

## 2017-09-25 DIAGNOSIS — Z13.6 CARDIOVASCULAR SCREENING; LDL GOAL LESS THAN 160: ICD-10-CM

## 2017-09-25 DIAGNOSIS — Z13.1 SCREENING FOR DIABETES MELLITUS: ICD-10-CM

## 2017-09-25 DIAGNOSIS — F32.1 MAJOR DEPRESSIVE DISORDER, SINGLE EPISODE, MODERATE DEGREE (H): Primary | ICD-10-CM

## 2017-09-25 PROCEDURE — 90686 IIV4 VACC NO PRSV 0.5 ML IM: CPT | Performed by: FAMILY MEDICINE

## 2017-09-25 PROCEDURE — 99213 OFFICE O/P EST LOW 20 MIN: CPT | Mod: 25 | Performed by: FAMILY MEDICINE

## 2017-09-25 PROCEDURE — 90471 IMMUNIZATION ADMIN: CPT | Performed by: FAMILY MEDICINE

## 2017-09-25 ASSESSMENT — ANXIETY QUESTIONNAIRES
6. BECOMING EASILY ANNOYED OR IRRITABLE: NEARLY EVERY DAY
1. FEELING NERVOUS, ANXIOUS, OR ON EDGE: MORE THAN HALF THE DAYS
GAD7 TOTAL SCORE: 18
2. NOT BEING ABLE TO STOP OR CONTROL WORRYING: MORE THAN HALF THE DAYS
7. FEELING AFRAID AS IF SOMETHING AWFUL MIGHT HAPPEN: NEARLY EVERY DAY
5. BEING SO RESTLESS THAT IT IS HARD TO SIT STILL: MORE THAN HALF THE DAYS
3. WORRYING TOO MUCH ABOUT DIFFERENT THINGS: NEARLY EVERY DAY

## 2017-09-25 ASSESSMENT — PATIENT HEALTH QUESTIONNAIRE - PHQ9
SUM OF ALL RESPONSES TO PHQ QUESTIONS 1-9: 16
5. POOR APPETITE OR OVEREATING: NEARLY EVERY DAY

## 2017-09-25 NOTE — NURSING NOTE
"Chief Complaint   Patient presents with     Dizziness     Recheck Medication       Initial /70 (BP Location: Right arm, Patient Position: Chair, Cuff Size: Adult Large)  Pulse 78  Temp 97.6  F (36.4  C)  Ht 4' 11.75\" (1.518 m)  Wt 176 lb (79.8 kg)  SpO2 98%  BMI 34.66 kg/m2 Estimated body mass index is 34.66 kg/(m^2) as calculated from the following:    Height as of this encounter: 4' 11.75\" (1.518 m).    Weight as of this encounter: 176 lb (79.8 kg).  Medication Reconciliation: complete   Jaimee Atwood MA      "

## 2017-09-25 NOTE — LETTER
My Depression Action Plan  Name: Cristobal Darnell   Date of Birth 1975  Date: 9/25/2017    My doctor: Shania Yeung   My clinic: [address]  [place] [address]  [phone]          GREEN    ZONE   Good Control    What it looks like:     Things are going generally well. You have normal up s and down s. You may even feel depressed from time to time, but bad moods usually last less than a day.   What you need to do:  1. Continue to care for yourself (see self care plan)  2. Check your depression survival kit and update it as needed  3. Follow your physician s recommendations including any medication.  4. Do not stop taking medication unless you consult with your physician first.           YELLOW         ZONE Getting Worse    What it looks like:     Depression is starting to interfere with your life.     It may be hard to get out of bed; you may be starting to isolate yourself from others.    Symptoms of depression are starting to last most all day and this has happened for several days.     You may have suicidal thoughts but they are not constant.   What you need to do:     1. Call your care team, your response to treatment will improve if you keep your care team informed of your progress. Yellow periods are signs an adjustment may need to be made.     2. Continue your self-care, even if you have to fake it!    3. Talk to someone in your support network    4. Open up your depression survival kit           RED    ZONE Medical Alert - Get Help    What it looks like:     Depression is seriously interfering with your life.     You may experience these or other symptoms: You can t get out of bed most days, can t work or engage in other necessary activities, you have trouble taking care of basic hygiene, or basic responsibilities, thoughts of suicide or death that will not go away, self-injurious behavior.     What you need to do:  1. Call your care team and request  a same-day appointment. If they are not available (weekends or after hours) call your local crisis line, emergency room or 911.      Electronically signed by: MILADYS BAKER, September 25, 2017    Depression Self Care Plan / Survival Kit    Self-Care for Depression  Here s the deal. Your body and mind are really not as separate as most people think.  What you do and think affects how you feel and how you feel influences what you do and think. This means if you do things that people who feel good do, it will help you feel better.  Sometimes this is all it takes.  There is also a place for medication and therapy depending on how severe your depression is, so be sure to consult with your medical provider and/ or Behavioral Health Consultant if your symptoms are worsening or not improving.     In order to better manage my stress, I will:    Exercise  Get some form of exercise, every day. This will help reduce pain and release endorphins, the  feel good  chemicals in your brain. This is almost as good as taking antidepressants!  This is not the same as joining a gym and then never going! (they count on that by the way ) It can be as simple as just going for a walk or doing some gardening, anything that will get you moving.      Hygiene   Maintain good hygiene (Get out of bed in the morning, Make your bed, Brush your teeth, Take a shower, and Get dressed like you were going to work, even if you are unemployed).  If your clothes don't fit try to get ones that do.    Diet  I will strive to eat foods that are good for me, drink plenty of water, and avoid excessive sugar, caffeine, alcohol, and other mood-altering substances.  Some foods that are helpful in depression are: complex carbohydrates, B vitamins, flaxseed, fish or fish oil, fresh fruits and vegetables.    Psychotherapy  I agree to participate in Individual Therapy (if recommended).    Medication  If prescribed medications, I agree to take them.  Missing doses  can result in serious side effects.  I understand that drinking alcohol, or other illicit drug use, may cause potential side effects.  I will not stop my medication abruptly without first discussing it with my provider.    Staying Connected With Others  I will stay in touch with my friends, family members, and my primary care provider/team.    Use your imagination  Be creative.  We all have a creative side; it doesn t matter if it s oil painting, sand castles, or mud pies! This will also kick up the endorphins.    Witness Beauty  (AKA stop and smell the roses) Take a look outside, even in mid-winter. Notice colors, textures. Watch the squirrels and birds.     Service to others  Be of service to others.  There is always someone else in need.  By helping others we can  get out of ourselves  and remember the really important things.  This also provides opportunities for practicing all the other parts of the program.    Humor  Laugh and be silly!  Adjust your TV habits for less news and crime-drama and more comedy.    Control your stress  Try breathing deep, massage therapy, biofeedback, and meditation. Find time to relax each day.     My support system    Clinic Contact:  Phone number:    Contact 1:  Phone number:    Contact 2:  Phone number:    Yazdanism/:  Phone number:    Therapist:  Phone number:    Local crisis center:    Phone number:    Other community support:  Phone number:

## 2017-09-25 NOTE — PATIENT INSTRUCTIONS
[facility]    If you have any questions regarding to your visit please contact your care team:       Team Purple:   Clinic Hours Telephone Number   Dr. Huong Kraus     7am-7pm  Monday - Thursday   7am-5pm  Fridays  [phone]  (Appointment scheduling available 24/7)    Questions about your Visit?   Team Line:  [phone]   Urgent Care - [place] and [facility] - 11am-9pm Monday-Friday Saturday-Sunday- 9am-5pm   [place] - 5pm-9pm Monday-Friday Saturday-Sunday- 9am-5pm  [phone] - [facility]  [phone] - [place]       What options do I have for visits at the clinic other than the traditional office visit?  To expand how we care for you, many of our providers are utilizing electronic visits (e-visits) and telephone visits, when medically appropriate, for interactions with their patients rather than a visit in the clinic.   We also offer nurse visits for many medical concerns. Just like any other service, we will bill your insurance company for this type of visit based on time spent on the phone with your provider. Not all insurance companies cover these visits. Please check with your medical insurance if this type of visit is covered. You will be responsible for any charges that are not paid by your insurance.      E-visits via [employer]:  generally incur a $35.00 fee.  Telephone visits:  Time spent on the phone: *charged based on time that is spent on the phone in increments of 10 minutes. Estimated cost:   5-10 mins $30.00   11-20 mins. $59.00   21-30 mins. $85.00     Use CaseReadert (secure email communication and access to your chart) to send your primary care provider a message or make an appointment. Ask someone on your Team how to sign up for [employer].  For a Price Quote for your services, please call our Consumer Price Line at [phone].  As always, Thank you for trusting us with your health care needs!
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
## 2017-09-25 NOTE — Clinical Note
Had pap smear 7/13/17 through [facility]. Can be seen on care everywhere. Please abstract  MILADYS BAKER M.D.

## 2017-09-25 NOTE — MR AVS SNAPSHOT
After Visit Summary   9/25/2017    Cristobal Darnell    MRN: [id number]           Patient Information     Date Of Birth          1975        Visit Information        Provider Department      9/25/2017 9:30 AM Huong Clifford MD [facility]        Today's Diagnoses     Major depressive disorder, single episode, moderate degree (H)    -  1    Visit for screening mammogram        Need for prophylactic vaccination and inoculation against influenza        CARDIOVASCULAR SCREENING; LDL GOAL LESS THAN 160        Screening for diabetes mellitus          Care Instructions    [facility]    If you have any questions regarding to your visit please contact your care team:       Team Purple:   Clinic Hours Telephone Number   Dr. Huong Kraus     7am-7pm  Monday - Thursday   7am-5pm  Fridays  [phone]  (Appointment scheduling available 24/7)    Questions about your Visit?   Team Line:  [phone]   [facility] [place] [facility] [place] 11am-9pm Monday-Friday Saturday-Sunday- 9am-5pm   [place] - 5pm-9pm Monday-Friday Saturday-Sunday- 9am-5pm  [phone] - [place]   [phone] - [place]       What options do I have for visits at the clinic other than the traditional office visit?  To expand how we care for you, many of our providers are utilizing electronic visits (e-visits) and telephone visits, when medically appropriate, for interactions with their patients rather than a visit in the clinic.   We also offer nurse visits for many medical concerns. Just like any other service, we will bill your insurance company for this type of visit based on time spent on the phone with your provider. Not all insurance companies cover these visits. Please check with your medical insurance if this type of visit is covered. You will be responsible for any charges that are not paid by your insurance.      E-visits via [place]:   "generally incur a $35.00 fee.  Telephone visits:  Time spent on the phone: *charged based on time that is spent on the phone in increments of 10 minutes. Estimated cost:   5-10 mins $30.00   11-20 mins. $59.00   21-30 mins. $85.00     Use Ludic Labshart (secure email communication and access to your chart) to send your primary care provider a message or make an appointment. Ask someone on your Team how to sign up for EchoPixelt.  For a Price Quote for your services, please call our [place] Line at [phone].  As always, Thank you for trusting us with your health care needs!              Follow-ups after your visit        Future tests that were ordered for you today     Open Future Orders        Priority Expected Expires Ordered    Lipid panel reflex to direct LDL Routine  11/25/2017 9/25/2017    Glucose Routine  11/25/2017 9/25/2017    MA SCREENING DIGITAL BILAT - Future  (s+30) Routine  9/21/2018 9/25/2017            Who to contact     If you have questions or need follow up information about today's clinic visit or your schedule please contact [facility] directly at [phone].  Normal or non-critical lab and imaging results will be communicated to you by MyChart, letter or phone within 4 business days after the clinic has received the results. If you do not hear from us within 7 days, please contact the clinic through Ludic Labshart or phone. If you have a critical or abnormal lab result, we will notify you by phone as soon as possible.  Submit refill requests through Zing or call your pharmacy and they will forward the refill request to us. Please allow 3 business days for your refill to be completed.          Additional Information About Your Visit        Ludic LabsharD1G Information     Zing lets you send messages to your doctor, view your test results, renew your prescriptions, schedule appointments and more. To sign up, go to [email or web address] . Click on \"Log in\" on the left side of the screen, " "which will take you to the Welcome page. Then click on \"Sign up Now\" on the right side of the page.     You will be asked to enter the access code listed below, as well as some personal information. Please follow the directions to create your username and password.     Your access code is: [email or web address]  Expires: 10/22/2017  2:56 PM     Your access code will  in 90 days. If you need help or a new code, please call your [place] clinic or [phone].        Care EveryWhere ID     This is your Care EveryWhere ID. This could be used by other organizations to access your [place] medical records  [id number]        Your Vitals Were     Pulse Temperature Height Pulse Oximetry BMI (Body Mass Index)       78 97.6  F (36.4  C) 4' 11.75\" (1.518 m) 98% 34.66 kg/m2        Blood Pressure from Last 3 Encounters:   17 112/70   17 106/73   16 125/77    Weight from Last 3 Encounters:   17 176 lb (79.8 kg)   17 171 lb (77.6 kg)   16 162 lb 12.8 oz (73.8 kg)              We Performed the Following     DEPRESSION ACTION PLAN (DAP)        Primary Care Provider Office Phone # Fax #    Shania Yeung [phone] [address] [facility] [address]        Equal Access to Services     [facility] AH: Hadii agustin mocko Soleobardo, waaxda luqadaha, qaybta kaalmada adekathleenyada, fan chicas. So [facility] [phone].    ATENCIÓN: Si habla español, tiene a wall disposición servicios gratuitos de asistencia lingüística. Caroline al [phone].    We comply with applicable federal civil rights laws and [place] laws. We do not discriminate on the basis of race, color, national origin, age, disability sex, sexual orientation or gender identity.            Thank you!     Thank you for choosing [facility]  for your care. Our goal is always to provide you with excellent care. Hearing back from our patients is one way we can continue to improve " our services. Please take a few minutes to complete the written survey that you may receive in the mail after your visit with us. Thank you!             Your Updated Medication List - Protect others around you: Learn how to safely use, store and throw away your medicines at www.disposemymeds.org.          This list is accurate as of: 9/25/17 10:13 AM.  Always use your most recent med list.                   Brand Name Dispense Instructions for use Diagnosis    buPROPion 300 MG 24 hr tablet    WELLBUTRIN XL    90 tablet    Take 1 tablet (300 mg) by mouth every morning    Major depressive disorder, single episode, moderate degree (H)       cholecalciferol 1000 UNIT tablet    vitamin D    90 tablet    Take 1 tablet (1,000 Units) by mouth daily    Dietary deficiency       cyanocobalamin 1000 MCG tablet    vitamin  B-12    90 tablet    Take 1 tablet (1,000 mcg) by mouth daily    Vitamin B 12 deficiency       DAILY CHRISTIAN Tabs     90 tablet    Take 1 tablet by mouth daily    Dietary deficiency       fish oil-omega-3 fatty acids 1000 MG capsule     90 each    TAKE ONE CAPSULE BY MOUTH ONCE DAILY    HDL lipoprotein deficiency       naproxen 500 MG tablet    NAPROSYN    60 tablet    Take 1 tablet (500 mg) by mouth 2 times daily as needed for moderate pain    Chronic low back pain, unspecified back pain laterality, with sciatica presence unspecified, Chondromalacia of both patellae       triamcinolone 0.1 % cream    KENALOG    45 g    Apply to affected area sparingly twice daily as needed    Intertrigo

## 2017-09-26 ENCOUNTER — RADIANT APPOINTMENT (OUTPATIENT)
Dept: MAMMOGRAPHY | Facility: CLINIC | Age: 42
End: 2017-09-26
Attending: FAMILY MEDICINE
Payer: COMMERCIAL

## 2017-09-26 DIAGNOSIS — Z12.31 VISIT FOR SCREENING MAMMOGRAM: ICD-10-CM

## 2017-09-26 DIAGNOSIS — Z13.6 CARDIOVASCULAR SCREENING; LDL GOAL LESS THAN 160: ICD-10-CM

## 2017-09-26 DIAGNOSIS — Z13.1 SCREENING FOR DIABETES MELLITUS: ICD-10-CM

## 2017-09-26 LAB
CHOLEST SERPL-MCNC: 168 MG/DL
GLUCOSE SERPL-MCNC: 89 MG/DL (ref 70–99)
HDLC SERPL-MCNC: 32 MG/DL
LDLC SERPL CALC-MCNC: 113 MG/DL
NONHDLC SERPL-MCNC: 136 MG/DL
TRIGL SERPL-MCNC: 113 MG/DL

## 2017-09-26 PROCEDURE — 36415 COLL VENOUS BLD VENIPUNCTURE: CPT | Performed by: FAMILY MEDICINE

## 2017-09-26 PROCEDURE — G0202 SCR MAMMO BI INCL CAD: HCPCS | Mod: TC

## 2017-09-26 PROCEDURE — 80061 LIPID PANEL: CPT | Performed by: FAMILY MEDICINE

## 2017-09-26 PROCEDURE — 82947 ASSAY GLUCOSE BLOOD QUANT: CPT | Performed by: FAMILY MEDICINE

## 2017-09-26 ASSESSMENT — ANXIETY QUESTIONNAIRES: GAD7 TOTAL SCORE: 18

## 2017-10-24 ENCOUNTER — TELEPHONE (OUTPATIENT)
Dept: FAMILY MEDICINE | Facility: CLINIC | Age: 42
End: 2017-10-24

## 2017-10-24 NOTE — TELEPHONE ENCOUNTER
Reason for Call:  Other call back    Detailed comments: patient called to discuss her lab results, please contact the patient to discuss further,     Phone Number Patient can be reached at: Home number on file [phone] (home)    Best Time: today    Can we leave a detailed message on this number? YES    Call taken on 10/24/2017 at 10:59 AM by Madisyn Macias

## 2018-01-18 ENCOUNTER — TRANSFERRED RECORDS (OUTPATIENT)
Dept: HEALTH INFORMATION MANAGEMENT | Facility: CLINIC | Age: 43
End: 2018-01-18

## 2018-03-06 ENCOUNTER — CARE COORDINATION (OUTPATIENT)
Dept: SURGERY | Facility: CLINIC | Age: 43
End: 2018-03-06

## 2018-03-08 ENCOUNTER — OFFICE VISIT (OUTPATIENT)
Dept: SURGERY | Facility: CLINIC | Age: 43
End: 2018-03-08

## 2018-03-08 ENCOUNTER — TELEPHONE (OUTPATIENT)
Dept: FAMILY MEDICINE | Facility: CLINIC | Age: 43
End: 2018-03-08

## 2018-03-08 ENCOUNTER — ALLIED HEALTH/NURSE VISIT (OUTPATIENT)
Dept: SURGERY | Facility: CLINIC | Age: 43
End: 2018-03-08

## 2018-03-08 VITALS
SYSTOLIC BLOOD PRESSURE: 118 MMHG | WEIGHT: 181.1 LBS | TEMPERATURE: 99.1 F | DIASTOLIC BLOOD PRESSURE: 75 MMHG | HEIGHT: 60 IN | HEART RATE: 79 BPM | BODY MASS INDEX: 35.56 KG/M2 | OXYGEN SATURATION: 99 %

## 2018-03-08 DIAGNOSIS — E66.812 CLASS 2 OBESITY WITHOUT SERIOUS COMORBIDITY WITH BODY MASS INDEX (BMI) OF 35.0 TO 35.9 IN ADULT, UNSPECIFIED OBESITY TYPE: Primary | ICD-10-CM

## 2018-03-08 RX ORDER — TOPIRAMATE 25 MG/1
TABLET, FILM COATED ORAL
Qty: 90 TABLET | Refills: 3 | Status: SHIPPED | OUTPATIENT
Start: 2018-03-08 | End: 2018-07-20

## 2018-03-08 NOTE — LETTER
3/8/2018          Cristobal Darnell  [address] [address]    [phone]    To whom it may concern,     I am the primary care provider for Cristobal Darnell and I support her having weight loss surgery.     Sincerely,        Shania Yeung MD  Dept of Family Practice

## 2018-03-08 NOTE — LETTER
"3/8/2018       RE: Cristobal Darnell  [address] [facility] [address]     Dear Colleague,    Thank you for referring your patient, Cristobal Darnell, to the [facility] SURGICAL WEIGHT MANAGEMENT at [facility]. Please see a copy of my visit note below.    New Bariatric Surgery Consultation Note    RE: Cristobal Darnell  MR#: [id number]  : 1975      Referring provider: No flowsheet data found.    Chief Complaint/Reason for visit: evaluation for possible weight loss surgery    Dear Shania Yeung MD (General),    I had the pleasure of seeing your patient, Cristobal Darnell, to evaluate her obesity and consider her for possible weight loss surgery. As you know, Cristobal Darnell is 42 year old.  She has a height of 4' 11.646\", a weight of 181 lbs 1.6 oz, and calculated Body mass index is 35.79 kg/(m^2).    HISTORY OF PRESENT ILLNESS:  Weight Loss History Reviewed with Patient 3/8/2018   How long have you been overweight? Since early childhood   What is the most that you have ever weighed? 184   What is the most weight you have lost? 5   I have tried the following methods to lose weight Exercise   I have tried the following weight loss medications? (Check all that apply) None   Have you ever had weight loss surgery? No       CO-MORBIDITIES OF OBESITY INCLUDE:     3/8/2018   I have the following co-morbidities associated with obesity: High Cholesterol, Sleep Apnea, Weight Bearing Joint Pain, Stress Incontinence   Do you use a CPAP? No       PAST MEDICAL HISTORY:  Past Medical History:   Diagnosis Date     Anxiety      Chondromalacia patella      Elevated uric acid 10/26/2011     GERD (gastroesophageal reflux disease)      HDL lipoprotein deficiency      Major depressive disorder, single episode, moderate degree (H)      Microscopic hematuria 10/1/2012     Migraine headaches 5/3/2010     Migraines      Plantar fasciitis      TMJ (temporomandibular joint disorder)      Vitamin B 12 " deficiency        PAST SURGICAL HISTORY:  Past Surgical History:   Procedure Laterality Date     NO HISTORY OF SURGERY         FAMILY HISTORY:   Family History   Problem Relation Age of Onset     DIABETES Mother      Hypertension Mother      C.A.D. Mother 58     MI      Depression Mother      CANCER Other      cousin, unknown      Asthma Son        SOCIAL HISTORY:   Social History Questions Reviewed With Patient 3/8/2018   Which best describes your employment status (select all that apply) I am disabled   Which best describes your marital status:    Do you have children? Yes   Who do you have in your support network that can be available to help you for the first 2 weeks after surgery? daugters   Who can you count on for support throughout your weight loss surgery journey? doughters   Can you afford 3 meals a day?  Yes   Can you afford 50-60 dollars a month for vitamins? Yes       HABITS:     3/8/2018   How often do you drink alcohol? Never   Have you or are you currently using street drugs or prescription strength medication for which you do not have a prescription for? Yes   Do you have a history of chemical dependency (alcohol or drug abuse)? No       PSYCHOLOGICAL HISTORY:   Psychological History Reviewed With Patient 3/8/2018   Have you ever attempted suicide? Never.   Have you had thoughts of suicide in the past year? No   Have you ever been hospitalized for mental illness or a suicide attempt? Never.   Do you have a history of chronic pain? Yes   Have you ever been diagnosed with fibromyalgia? No   Are you currently being treated for any of the following? (select all that apply) Depression   Are you currently seeing a therapist or counselor?  Yes   Are you currently seeing a psychiatrist? Yes       ROS:     3/8/2018   Skin:  Leg swelling   HEENT: Headaches, Dizziness/lightheadedness   Musculoskeletal: Joint Pain, Back pain, Swelling of legs, Arthritis   Cardiovascular: Chest pain, Shortness of breath  "with activity   Pulmonary: Shortness of breath with activity, Snoring   Gastrointestinal: Heartburn, Reflux, Constipation   Genitourinary: Stress incontinence (losing urine when coughing, sneezing, etc.)   Hematological: Family history of blood clots   Neurological: None of the above   Female only: None of the above       EATING BEHAVIORS:     3/8/2018   Have you or anyone else thought that you had an eating disorder? No   Do you currently binge eat (eat a large amount of food in a short time)? Yes   Are you an emotional eater? Yes   Do you get up to eat after falling asleep? No       EXERCISE:     3/8/2018   How often do you exercise? Never   What keeps you from being more active?  Pain, I have recently been sick, My ability to walk or move around is limited, Shortness of breath, Lack of Time, Too tired       MEDICATIONS:  Current Outpatient Prescriptions   Medication     triamcinolone (KENALOG) 0.1 % cream     cyanocobalamin (VITAMIN  B-12) 1000 MCG tablet     cholecalciferol (VITAMIN D3) 1000 UNIT tablet     fish oil-omega-3 fatty acids 1000 MG capsule     Multiple Vitamin (DAILY CHRISTIAN) TABS     buPROPion (WELLBUTRIN XL) 300 MG 24 hr tablet     naproxen (NAPROSYN) 500 MG tablet     No current facility-administered medications for this visit.        ALLERGIES:  Allergies   Allergen Reactions     Citalopram      Dizziness at 40 mg      Voltaren GI Disturbance     Zoloft      dizziness       LABS/IMAGING/MEDICAL RECORDS REVIEW:     PHYSICAL EXAM:  /75  Pulse 79  Temp 99.1  F (37.3  C) (Oral)  Ht 4' 11.65\"  Wt 181 lb 1.6 oz  SpO2 99%  BMI 35.79 kg/m2  General: NAD  Neurologic: A & O x 3, gait normal  Head: normocephalic, atraumatic  HEENT: PERRL, EOMI.   Respiratory: respirations unlabored  Abdomen: Obese, Soft NT ND   Extremities: No LE swelling   Skin: warm and dry.  No rashes on exposed skin  Psychiatric: Mentation and Affect appear normal      In summary, Cristobal Darnell has Class III obesity with a " body mass index of Body mass index is 35.79 kg/(m^2). kg/m2 and the comorbidities stated above. She completed an informational seminar and is interested in weight loss surgery . She is not a candidate due to BMI 35.79.  Her BMI has not been over 35 for the last two years as required by [facility].  Follow up with PCP regarding depression medication  See medical weight management MD for new consult.  Discussed possible topiramate and she will research and discuss with PCP  BMI 35 and co-morbidities include joint pain that is severe enough that it affects her daily activities  We also discussed the intragastric balloon and if she is interested call Denny Keller [phone] to schedule with Dr Gastelum to discusss  See RD today and monthly for 6 months is planning for possible sleeve gastrectomy  Start topiramate ramp to 75mg. Discussed risks and side effects  MEDICATION DISCUSSED AT THIS APPOINTMENT      Topiramate (Topamax) is a medication that is used most often to treat migraine headaches or for seizures. It has also been found to help with weight loss. Although it's not currently FDA approved for weight loss, it has been used safely for a number of years to help people who are carrying extra weight.     Just how topiramate helps with weight loss has not been exactly determined. However it seems to work on areas of the brain to quiet down signals related to eating.      Topiramate may make you:    >feel less interest in eating in between meals   >think less about food and eating   >find it easier to push the plate away   >find giving up pop easier    >have an easier time eating less    For some of our patients, the pills work right away. They feel and think quite differently about food. Other patients don't feel much of a change but find in fact they have lost weight! Like all weight loss medications, topiramate works best when you help it work.  This means:    1) Have less tempting high calorie (fattening) food around the  house or office    2) Have lower calorie food (fruits, vegetables,low fat meats and dairy) for snacks    3) Eat out only one time or less each week.   4) Eat your meals at a table with the TV or computer off.    Side-effects. Topiramate is generally well tolerated. The main side-effects we see are:   Tingling in hands,feet, or face (usually not very troublesome)   Mental confusion and word finding trouble (about 10% of patients have this.)     Feeling sleepy or a bit dopey- this goes away very soon after starting.    One of the dangers of topiramate is the possibility of birth defects--if you get pregnant when you are on it, there is the risk that your baby will be born with a cleft lip or palate.  If you are on topiramate and of child bearing age, you need to be on a reliable form of birth control or refrain from sexual intercourse.     Please refer to the pharmacy insert for more information on side-effects. Since many pharmacists are not familiar with the use of topiramate in weight loss, calling the clinic will get you the most accurate information on the use of this medication for weight loss.     In order to get refills of this or any medication we prescribe you must be seen in the medical weight mgmt clinic every 2-3 months. Please have your pharmacy fax a refill request to [phone].  Today in the office we discussed gastric sleeve surgery. Preoperative, perioperative, and postoperative processes, management, and follow up were addressed.  Risks and benefits were outlined including the risk of death, staple line leak (1-2%), PE, DVT, ulcer, worsening GERD, N/V, stricture, hernia, wound infection, weight regain, and vitamin deficiencies. I emphasized exercise and activity along with appropriate food choice as the main foundation for weight loss with surgery providing surgical reinforcement of this.  All questions were answered.  A goal sheet and support group handout were given to the patient.    Once the  patient has completed the requirements in their task list and there are no further recommendations, the pt will be allowed to see the surgeon of her choice for consultation on the laparoscopic gastric sleeve surgery. Patient verbalizes understanding of the process to surgery and expectations for the postoperative period including the need for lifelong lifestyle changes, vitamin supplementation, and laboratory monitoring.       I spent a total of 30 minutes face to face with Cristobal during today's office visit. Over 50% of this time was spent counseling the patient and/or coordinating care.        Again, thank you for allowing me to participate in the care of your patient.      Sincerely,    Stacey Lassiter PA-C

## 2018-03-08 NOTE — PATIENT INSTRUCTIONS
See Freda Elliott for bariatric psychological evaluation  Follow up with PCP regarding depression medication  See medical weight management MD for new consult.  Discussed possible topiramate and she will research and discuss with PCP  [email or web address] to watch online seminar  BMI 35 and co-morbidities include joint pain that is severe enough that it affects her daily activities  We also discussed the intragastric balloon and if she is interested call Denny Keller [phone] to schedule with Dr Gastelum to discusss  If cleared by psychological and interested in sleeve gastrectomy then need to see Dr Gastelum for PBS to discuss  See dietitian today and monthly for 6 months.  MEDICATION DISCUSSED AT THIS APPOINTMENT      Topiramate (Topamax) is a medication that is used most often to treat migraine headaches or for seizures. It has also been found to help with weight loss. Although it's not currently FDA approved for weight loss, it has been used safely for a number of years to help people who are carrying extra weight.     Just how topiramate helps with weight loss has not been exactly determined. However it seems to work on areas of the brain to quiet down signals related to eating.      Topiramate may make you:    >feel less interest in eating in between meals   >think less about food and eating   >find it easier to push the plate away   >find giving up pop easier    >have an easier time eating less    For some of our patients, the pills work right away. They feel and think quite differently about food. Other patients don't feel much of a change but find in fact they have lost weight! Like all weight loss medications, topiramate works best when you help it work.  This means:    1) Have less tempting high calorie (fattening) food around the house or office    2) Have lower calorie food (fruits, vegetables,low fat meats and dairy) for snacks    3) Eat out only one time or less each week.   4) Eat your meals at a table with  the TV or computer off.    Side-effects. Topiramate is generally well tolerated. The main side-effects we see are:   Tingling in hands,feet, or face (usually not very troublesome)   Mental confusion and word finding trouble (about 10% of patients have this.)     Feeling sleepy or a bit dopey- this goes away very soon after starting.    One of the dangers of topiramate is the possibility of birth defects--if you get pregnant when you are on it, there is the risk that your baby will be born with a cleft lip or palate.  If you are on topiramate and of child bearing age, you need to be on a reliable form of birth control or refrain from sexual intercourse.     Please refer to the pharmacy insert for more information on side-effects. Since many pharmacists are not familiar with the use of topiramate in weight loss, calling the clinic will get you the most accurate information on the use of this medication for weight loss.     In order to get refills of this or any medication we prescribe you must be seen in the medical weight mgmt clinic every 2-3 months. Please have your pharmacy fax a refill request to [phone].

## 2018-03-08 NOTE — PATIENT INSTRUCTIONS
"GOALS:  Relating To Eating:  Eat slowly (20-30 minutes per meal), chewing foods well (25 chews per bite/applesauce consistency)  9\" Plate method (1/2 plate non-starchy vegetables/fruit, 1/4 plate lean protein, 1/4 plate whole grain starch - no more than 1/2 cup carb/meal)  Avoid snacking    Relating to beverages:  Reduce caffeine/carbonation/calorie containing beverages  Separate fluids from meals by 30 minutes before, during, and after eating    Relating to dietary supplements:  Start a multivitamin containing iron daily    Relating to activity:  Increase activity as able    Relating to cravings:  Practice alternatives to emotion eating    Delaney Huerta RD, LD  Follow up in one month  If you need to schedule or reschedule with a dietitian please call [phone].    "

## 2018-03-08 NOTE — PROGRESS NOTES
"New Bariatric Surgery Consultation Note    RE: Cristobal Darnell  MR#: [id number]  : 1975      Referring provider: No flowsheet data found.    Chief Complaint/Reason for visit: evaluation for possible weight loss surgery    Dear Shania Yeung MD (General),    I had the pleasure of seeing your patient, Cristobal Darnell, to evaluate her obesity and consider her for possible weight loss surgery. As you know, Cristobal Darnell is 42 year old.  She has a height of 4' 11.646\", a weight of 181 lbs 1.6 oz, and calculated Body mass index is 35.79 kg/(m^2).    HISTORY OF PRESENT ILLNESS:  Weight Loss History Reviewed with Patient 3/8/2018   How long have you been overweight? Since early childhood   What is the most that you have ever weighed? 184   What is the most weight you have lost? 5   I have tried the following methods to lose weight Exercise   I have tried the following weight loss medications? (Check all that apply) None   Have you ever had weight loss surgery? No       CO-MORBIDITIES OF OBESITY INCLUDE:     3/8/2018   I have the following co-morbidities associated with obesity: High Cholesterol, Sleep Apnea, Weight Bearing Joint Pain, Stress Incontinence   Do you use a CPAP? No       PAST MEDICAL HISTORY:  Past Medical History:   Diagnosis Date     Anxiety      Chondromalacia patella      Elevated uric acid 10/26/2011     GERD (gastroesophageal reflux disease)      HDL lipoprotein deficiency      Major depressive disorder, single episode, moderate degree (H)      Microscopic hematuria 10/1/2012     Migraine headaches 5/3/2010     Migraines      Plantar fasciitis      TMJ (temporomandibular joint disorder)      Vitamin B 12 deficiency        PAST SURGICAL HISTORY:  Past Surgical History:   Procedure Laterality Date     NO HISTORY OF SURGERY         FAMILY HISTORY:   Family History   Problem Relation Age of Onset     DIABETES Mother      Hypertension Mother      C.A.D. Mother 58     MI      Depression Mother      " CANCER Other      cousin, unknown      Asthma Son        SOCIAL HISTORY:   Social History Questions Reviewed With Patient 3/8/2018   Which best describes your employment status (select all that apply) I am disabled   Which best describes your marital status:    Do you have children? Yes   Who do you have in your support network that can be available to help you for the first 2 weeks after surgery? daugters   Who can you count on for support throughout your weight loss surgery journey? doughters   Can you afford 3 meals a day?  Yes   Can you afford 50-60 dollars a month for vitamins? Yes       HABITS:     3/8/2018   How often do you drink alcohol? Never   Have you or are you currently using street drugs or prescription strength medication for which you do not have a prescription for? Yes   Do you have a history of chemical dependency (alcohol or drug abuse)? No       PSYCHOLOGICAL HISTORY:   Psychological History Reviewed With Patient 3/8/2018   Have you ever attempted suicide? Never.   Have you had thoughts of suicide in the past year? No   Have you ever been hospitalized for mental illness or a suicide attempt? Never.   Do you have a history of chronic pain? Yes   Have you ever been diagnosed with fibromyalgia? No   Are you currently being treated for any of the following? (select all that apply) Depression   Are you currently seeing a therapist or counselor?  Yes   Are you currently seeing a psychiatrist? Yes       ROS:     3/8/2018   Skin:  Leg swelling   HEENT: Headaches, Dizziness/lightheadedness   Musculoskeletal: Joint Pain, Back pain, Swelling of legs, Arthritis   Cardiovascular: Chest pain, Shortness of breath with activity   Pulmonary: Shortness of breath with activity, Snoring   Gastrointestinal: Heartburn, Reflux, Constipation   Genitourinary: Stress incontinence (losing urine when coughing, sneezing, etc.)   Hematological: Family history of blood clots   Neurological: None of the above   Female  "only: None of the above       EATING BEHAVIORS:     3/8/2018   Have you or anyone else thought that you had an eating disorder? No   Do you currently binge eat (eat a large amount of food in a short time)? Yes   Are you an emotional eater? Yes   Do you get up to eat after falling asleep? No       EXERCISE:     3/8/2018   How often do you exercise? Never   What keeps you from being more active?  Pain, I have recently been sick, My ability to walk or move around is limited, Shortness of breath, Lack of Time, Too tired       MEDICATIONS:  Current Outpatient Prescriptions   Medication     triamcinolone (KENALOG) 0.1 % cream     cyanocobalamin (VITAMIN  B-12) 1000 MCG tablet     cholecalciferol (VITAMIN D3) 1000 UNIT tablet     fish oil-omega-3 fatty acids 1000 MG capsule     Multiple Vitamin (DAILY CHRISTIAN) TABS     buPROPion (WELLBUTRIN XL) 300 MG 24 hr tablet     naproxen (NAPROSYN) 500 MG tablet     No current facility-administered medications for this visit.        ALLERGIES:  Allergies   Allergen Reactions     Citalopram      Dizziness at 40 mg      Voltaren GI Disturbance     Zoloft      dizziness       LABS/IMAGING/MEDICAL RECORDS REVIEW:     PHYSICAL EXAM:  /75  Pulse 79  Temp 99.1  F (37.3  C) (Oral)  Ht 4' 11.65\"  Wt 181 lb 1.6 oz  SpO2 99%  BMI 35.79 kg/m2  General: NAD  Neurologic: A & O x 3, gait normal  Head: normocephalic, atraumatic  HEENT: PERRL, EOMI.   Respiratory: respirations unlabored  Abdomen: Obese, Soft NT ND   Extremities: No LE swelling   Skin: warm and dry.  No rashes on exposed skin  Psychiatric: Mentation and Affect appear normal      In summary, Cristobal Darnell has Class III obesity with a body mass index of Body mass index is 35.79 kg/(m^2). kg/m2 and the comorbidities stated above. She completed an informational seminar and is interested in weight loss surgery . She is not a candidate due to BMI 35.79.  Her BMI has not been over 35 for the last two years as required by " Jennifer.  Follow up with PCP regarding depression medication  See medical weight management MD for new consult.  Discussed possible topiramate and she will research and discuss with PCP  BMI 35 and co-morbidities include joint pain that is severe enough that it affects her daily activities  We also discussed the intragastric balloon and if she is interested call Denny Keller [phone] to schedule with Dr Gastelum to discusss  See RD today and monthly for 6 months is planning for possible sleeve gastrectomy  Start topiramate ramp to 75mg. Discussed risks and side effects  MEDICATION DISCUSSED AT THIS APPOINTMENT      Topiramate (Topamax) is a medication that is used most often to treat migraine headaches or for seizures. It has also been found to help with weight loss. Although it's not currently FDA approved for weight loss, it has been used safely for a number of years to help people who are carrying extra weight.     Just how topiramate helps with weight loss has not been exactly determined. However it seems to work on areas of the brain to quiet down signals related to eating.      Topiramate may make you:    >feel less interest in eating in between meals   >think less about food and eating   >find it easier to push the plate away   >find giving up pop easier    >have an easier time eating less    For some of our patients, the pills work right away. They feel and think quite differently about food. Other patients don't feel much of a change but find in fact they have lost weight! Like all weight loss medications, topiramate works best when you help it work.  This means:    1) Have less tempting high calorie (fattening) food around the house or office    2) Have lower calorie food (fruits, vegetables,low fat meats and dairy) for snacks    3) Eat out only one time or less each week.   4) Eat your meals at a table with the TV or computer off.    Side-effects. Topiramate is generally well tolerated. The main side-effects  we see are:   Tingling in hands,feet, or face (usually not very troublesome)   Mental confusion and word finding trouble (about 10% of patients have this.)     Feeling sleepy or a bit dopey- this goes away very soon after starting.    One of the dangers of topiramate is the possibility of birth defects--if you get pregnant when you are on it, there is the risk that your baby will be born with a cleft lip or palate.  If you are on topiramate and of child bearing age, you need to be on a reliable form of birth control or refrain from sexual intercourse.     Please refer to the pharmacy insert for more information on side-effects. Since many pharmacists are not familiar with the use of topiramate in weight loss, calling the clinic will get you the most accurate information on the use of this medication for weight loss.     In order to get refills of this or any medication we prescribe you must be seen in the medical weight mgmt clinic every 2-3 months. Please have your pharmacy fax a refill request to [phone].  Today in the office we discussed gastric sleeve surgery. Preoperative, perioperative, and postoperative processes, management, and follow up were addressed.  Risks and benefits were outlined including the risk of death, staple line leak (1-2%), PE, DVT, ulcer, worsening GERD, N/V, stricture, hernia, wound infection, weight regain, and vitamin deficiencies. I emphasized exercise and activity along with appropriate food choice as the main foundation for weight loss with surgery providing surgical reinforcement of this.  All questions were answered.  A goal sheet and support group handout were given to the patient.    Once the patient has completed the requirements in their task list and there are no further recommendations, the pt will be allowed to see the surgeon of her choice for consultation on the laparoscopic gastric sleeve surgery. Patient verbalizes understanding of the process to surgery and  expectations for the postoperative period including the need for lifelong lifestyle changes, vitamin supplementation, and laboratory monitoring.       Sincerely,      Stacey Lassiter PA-C    I spent a total of 30 minutes face to face with Cristobal during today's office visit. Over 50% of this time was spent counseling the patient and/or coordinating care.

## 2018-03-08 NOTE — MR AVS SNAPSHOT
After Visit Summary   3/8/2018    Cristobal Darnell    MRN: [id number]           Patient Information     Date Of Birth          1975        Visit Information        Provider Department      3/8/2018 12:45 PM Stacey Lassiter PA-C [facility]        Care Instructions    See Freda Elliott for bariatric psychological evaluation  Follow up with PCP regarding depression medication  See medical weight management MD for new consult.  Discussed possible topiramate and she will research and discuss with PCP  [email or web address] to watch online seminar  BMI 35 and co-morbidities include joint pain that is severe enough that it affects her daily activities  We also discussed the intragastric balloon and if she is interested call Denny Keller [phone] to schedule with Dr Gastelum to discusss  If cleared by psychological and interested in sleeve gastrectomy then need to see Dr Gastelum for PBS to discuss  See dietitian today and monthly for 6 months.  MEDICATION DISCUSSED AT THIS APPOINTMENT      Topiramate (Topamax) is a medication that is used most often to treat migraine headaches or for seizures. It has also been found to help with weight loss. Although it's not currently FDA approved for weight loss, it has been used safely for a number of years to help people who are carrying extra weight.     Just how topiramate helps with weight loss has not been exactly determined. However it seems to work on areas of the brain to quiet down signals related to eating.      Topiramate may make you:    >feel less interest in eating in between meals   >think less about food and eating   >find it easier to push the plate away   >find giving up pop easier    >have an easier time eating less    For some of our patients, the pills work right away. They feel and think quite differently about food. Other patients don't feel much of a change but find in fact they have lost weight! Like all weight loss  medications, topiramate works best when you help it work.  This means:    1) Have less tempting high calorie (fattening) food around the house or office    2) Have lower calorie food (fruits, vegetables,low fat meats and dairy) for snacks    3) Eat out only one time or less each week.   4) Eat your meals at a table with the TV or computer off.    Side-effects. Topiramate is generally well tolerated. The main side-effects we see are:   Tingling in hands,feet, or face (usually not very troublesome)   Mental confusion and word finding trouble (about 10% of patients have this.)     Feeling sleepy or a bit dopey- this goes away very soon after starting.    One of the dangers of topiramate is the possibility of birth defects--if you get pregnant when you are on it, there is the risk that your baby will be born with a cleft lip or palate.  If you are on topiramate and of child bearing age, you need to be on a reliable form of birth control or refrain from sexual intercourse.     Please refer to the pharmacy insert for more information on side-effects. Since many pharmacists are not familiar with the use of topiramate in weight loss, calling the clinic will get you the most accurate information on the use of this medication for weight loss.     In order to get refills of this or any medication we prescribe you must be seen in the medical weight mgmt clinic every 2-3 months. Please have your pharmacy fax a refill request to [phone].          Follow-ups after your visit        Who to contact     Please call your clinic at [phone] to:    Ask questions about your health    Make or cancel appointments    Discuss your medicines    Learn about your test results    Speak to your doctor            Additional Information About Your Visit        [employer] Information     [employer] is an electronic gateway that provides easy, online access to your medical records. With [employer], you can request a clinic appointment, read your test  "results, renew a prescription or communicate with your care team.     To sign up for [employer] visit the website at [email or web address]   You will be asked to enter the access code listed below, as well as some personal information. Please follow the directions to create your username and password.     Your access code is: [email or web address]  Expires: 2018  2:08 PM     Your access code will  in 90 days. If you need help or a new code, please contact your [facility] or call [phone] for assistance.        Care EveryWhere ID     This is your Care EveryWhere ID. This could be used by other organizations to access your [facility] medical records  [id number]        Your Vitals Were     Pulse Temperature Height Pulse Oximetry BMI (Body Mass Index)       79 99.1  F (37.3  C) (Oral) 4' 11.65\" 99% 35.79 kg/m2        Blood Pressure from Last 3 Encounters:   18 118/75   17 112/70   17 106/73    Weight from Last 3 Encounters:   18 181 lb 1.6 oz   17 176 lb   17 171 lb              Today, you had the following     No orders found for display       Primary Care Provider Office Phone # Fax #    Shania Yeung [phone] [address] [facility] [address]        Equal Access to Services     TARAS TORRES AH: Hadii agustin ku hadasho Soomaali, waaxda luqadaha, qaybta kaalmada adeegyada, fan wiley . So [facility] [phone].    ATENCIÓN: Si habla español, tiene a wall disposición servicios gratuitos de asistencia lingüística. Caroline al [phone].    We comply with applicable federal civil rights laws and [place] laws. We do not discriminate on the basis of race, color, national origin, age, disability, sex, sexual orientation, or gender identity.            Thank you!     Thank you for choosing [facility]  for your care. Our goal is always to provide you with excellent care. " Hearing back from our patients is one way we can continue to improve our services. Please take a few minutes to complete the written survey that you may receive in the mail after your visit with us. Thank you!             Your Updated Medication List - Protect others around you: Learn how to safely use, store and throw away your medicines at www.disposemymeds.org.          This list is accurate as of 3/8/18  1:36 PM.  Always use your most recent med list.                   Brand Name Dispense Instructions for use Diagnosis    buPROPion 300 MG 24 hr tablet    WELLBUTRIN XL    90 tablet    Take 1 tablet (300 mg) by mouth every morning    Major depressive disorder, single episode, moderate degree (H)       cholecalciferol 1000 UNIT tablet    vitamin D3    90 tablet    Take 1 tablet (1,000 Units) by mouth daily    Dietary deficiency       cyanocobalamin 1000 MCG tablet    vitamin  B-12    90 tablet    Take 1 tablet (1,000 mcg) by mouth daily    Vitamin B 12 deficiency       DAILY CHRISTIAN Tabs     90 tablet    Take 1 tablet by mouth daily    Dietary deficiency       fish oil-omega-3 fatty acids 1000 MG capsule     90 each    TAKE ONE CAPSULE BY MOUTH ONCE DAILY    HDL lipoprotein deficiency       naproxen 500 MG tablet    NAPROSYN    60 tablet    Take 1 tablet (500 mg) by mouth 2 times daily as needed for moderate pain    Chronic low back pain, unspecified back pain laterality, with sciatica presence unspecified, Chondromalacia of both patellae       triamcinolone 0.1 % cream    KENALOG    45 g    Apply to affected area sparingly twice daily as needed    Intertrigo

## 2018-03-08 NOTE — TELEPHONE ENCOUNTER
Panel Management Review      Patient has the following on her problem list:     Depression / Dysthymia review    Measure:  Needs PHQ-9 score of 4 or less during index window.  Administer PHQ-9 and if score is 5 or more, send encounter to provider for next steps.    5   7 month window range: Index Date: 09/25/2017, Index PHQ9: 16, FU Start Date:02/25/2018, FU End Date:04/25/2018    PHQ-9 SCORE 2/10/2016 2/14/2017 9/25/2017   Total Score - - -   Total Score 11 9 16       If PHQ-9 recheck is 5 or more, route to provider for next steps.    Patient is due for:  PHQ9      Composite cancer screening  Chart review shows that this patient is due/due soon for the following None  Summary:    Patient is due/failing the following:   PHQ9    Action needed:   Patient needs to do PHQ9.    Type of outreach:    Phone, spoke to patient.  Completed PHQ9 over the phone. PHQ scored: 18 and scheduled appointment with Dr. Yeung on 03/21/2018 for Depression Check.    Questions for provider review:    None                                                                                                                                    Chelsie Pittman MA       Chart routed to none .

## 2018-03-08 NOTE — TELEPHONE ENCOUNTER
"----- Message from Stacey Lassiter PA-C sent at 3/8/2018  1:26 PM CST -----  Dear Primary Care Provider:    Thank you for coordinating with us to evaluate your patient for possible bariatric surgery. It is important to us that the primary care provider is aware of their patients' desire to have weight loss surgery and is supportive.    If you have not already submitted a referral or clinic note indicating your support for this patient to have weight loss surgery please enter a letter of support via Epic prior to the patient's next visit in our clinic.      Sample letter:    \"Dear [facility],    I am the primary care provider for (patient name) and I support (him/her) having weight loss surgery.    Sincerely,    (provider name)\"      If patient meets criteria and clearances for surgery, we will submit to the insurance company for insurance approval and coordinate the needed appointments with our department.    If you have any questions, feel free to contact our pre-surgery nurse coordinator, Mehnaz Wiggins RN at ph #: [phone].     Sincerely,    Dr. Garcia Gastelum,   Dr. Jovon Marrero,  Stacey Lassiter PA-C  [facility]  [address], [address], [facility] [address]  Website: [email or web address]    Please fax the letter to [phone] or route to Mehnaz Wiggins via [employer].    "

## 2018-03-08 NOTE — PROGRESS NOTES
"New Bariatric Nutrition Consultation Note    Reason For Visit: Nutrition Assessment    Cristobal Darnell is a 42 year old presenting today for new bariatric nutrition consult.   Pt is interested in laparoscopic sleeve gastrectomy with Dr. Gastelum.  This is pt's first of 6 required nutrition visits prior to surgery. Pt referred by Stacey HERNANDEZ.     She is interested in having weight loss surgery for the following reasons:  Joint pain unable to keep the weight off on her own    Support System Reviewed With Patient 3/8/2018   Who do you have in your support network that can be available to help you for the first 2 weeks after surgery? daugters   Who can you count on for support throughout your weight loss surgery journey? tylor       ANTHROPOMETRICS:  Estimated body mass index is 35.79 kg/(m^2) as calculated from the following:    Height as of an earlier encounter on 3/8/18: 1.515 m (4' 11.65\").    Weight as of an earlier encounter on 3/8/18: 82.1 kg (181 lb 1.6 oz).    Required weight loss goal pre-op: TBD       3/8/2018   I have tried the following methods to lose weight Exercise       Weight Loss Questions Reviewed With Patient 3/8/2018   How long have you been overweight? Since early childhood       NUTRITION HISTORY:  Recall Diet Questions Reviewed With Patient 3/8/2018   Describe what you typically consume for breakfast (typical or most recent): eggs and cheese two toast lepna and fol(cheese and beans)   Describe what you typically consume for lunch (typical or most recent): rice with chicken or fish   Describe what you typically consume for supper (typical or most recent): toast with cheese and tea(with honey)   Describe what you typically consume as snacks (typical or most recent): Banana, grab whatever is in the house- crackers with PB, chips, ice cream   How many ounces of water, or other low calorie drinks, do you drink daily (8 oz=1 glass)? 24 oz   How many ounces of caffeine (coffee, tea, pop) do you " drink daily (8 oz=1 glass)? 24 oz 1 cup coffee with creamer 3-4 6oz cups of tea with honey   How many ounces of carbonated (pop, beer, sparkling water) drinks do you drinky daily (8 oz=1 glass)? Coke three times per week   How many ounces of juice, pop, sweet tea, sports drinks, protein drinks, other sweetened drinks, do you drink daily (8 oz=1 glass)?    How often do you drink alcohol? Never       Eating Habits 3/8/2018   Do you have any dietary restrictions? No   Do you currently binge eat (eat a large amount of food in a short time)? Yes   Are you an emotional eater? Yes   Do you get up to eat after falling asleep? No   What foods do you crave? sweet       ADDITIONAL INFORMATION:    Dining Out History Reviewed With Patient 3/8/2018   How often do you dine out? A couple of times a week.   Where do you dine out? (select all that apply) fast food chains   What types of food do you order when you dine out? rice with meat       Physical Activity Reviewed With Patient 3/8/2018   How often do you exercise? Never   What keeps you from being more active?  Pain, I have recently been sick, My ability to walk or move around is limited, Shortness of breath, Lack of Time, Too tired       NUTRITION DIAGNOSIS:  Obesity r/t long history of self-monitoring deficit and excessive energy intake aeb BMI >30.    INTERVENTION:  Intervention Provided/Education Provided on post-op diet guidelines, vitamins/minerals essential post-operatively, GI anatomy of bariatric surgeries, ways to help prepare for post-op diet guidelines pre-operatively, portion/calorie-control, mindful eating and sources of protein.  Provided pt with list of goals RD contact information.      Questions Reviewed With Patient 3/8/2018   How ready are you to make changes regarding your weight? Number 1 = Not ready at all to make changes up to 10 = very ready. 7   How confident are you that you can change? 1 = Not confident that you will be successful making changes up  "to 10 = very confident. 8       Patient Understanding: fair-good  Expected Compliance: fair-good    GOALS:  Relating To Eating:  Eat slowly (20-30 minutes per meal), chewing foods well (25 chews per bite/applesauce consistency)  9\" Plate method (1/2 plate non-starchy vegetables/fruit, 1/4 plate lean protein, 1/4 plate whole grain starch - no more than 1/2 cup carb/meal)  Avoid snacking    Relating to beverages:  Reduce caffeine/carbonation/calorie containing beverages  Separate fluids from meals by 30 minutes before, during, and after eating    Relating to dietary supplements:  Start a multivitamin containing iron daily    Relating to activity:  Increase activity as able    Relating to cravings:  Practice alternatives to emotion eating      Time spent with patient: 30 minutes.  Delaney Huerta, SONIA, LD    "

## 2018-03-08 NOTE — NURSING NOTE
"(   Chief Complaint   Patient presents with     Consult     NBS/BMI 35.1     )    ( Weight: 181 lb 1.6 oz )  ( Height: 4' 11.65\" )  ( BMI (Calculated): 35.86 )  ( Initial Weight: 181 lb 1.6 oz )  ( Cumulative weight loss (lbs): 0 )  (   )  (   )  ( Waist Circumference (cm): 112 cm )  (   )    ( BP: 118/75 )  (   )  ( Temp: 99.1  F (37.3  C) )  ( Temp src: Oral )  ( Pulse: 79 )  (   )  ( SpO2: 99 % )    (   Patient Active Problem List   Diagnosis     Vitamin B 12 deficiency     CARDIOVASCULAR SCREENING; LDL GOAL LESS THAN 160     Obesity     Major depressive disorder, single episode, moderate degree (H)     Chronic low back pain     CTS (carpal tunnel syndrome) - bilateral     Elevated blood uric acid level     Migraines     Pain in thoracic spine     GERD (gastroesophageal reflux disease)     Intertrigo     Bilateral knee pain     Female stress incontinence     TMJ (temporomandibular joint disorder)     Cervicalgia     Myofascial muscle pain    )  (   Current Outpatient Prescriptions   Medication Sig Dispense Refill     triamcinolone (KENALOG) 0.1 % cream Apply to affected area sparingly twice daily as needed 45 g 1     cyanocobalamin (VITAMIN  B-12) 1000 MCG tablet Take 1 tablet (1,000 mcg) by mouth daily 90 tablet 3     cholecalciferol (VITAMIN D3) 1000 UNIT tablet Take 1 tablet (1,000 Units) by mouth daily 90 tablet 3     fish oil-omega-3 fatty acids 1000 MG capsule TAKE ONE CAPSULE BY MOUTH ONCE DAILY 90 each 3     Multiple Vitamin (DAILY CHRISTIAN) TABS Take 1 tablet by mouth daily 90 tablet 3     buPROPion (WELLBUTRIN XL) 300 MG 24 hr tablet Take 1 tablet (300 mg) by mouth every morning 90 tablet 3     naproxen (NAPROSYN) 500 MG tablet Take 1 tablet (500 mg) by mouth 2 times daily as needed for moderate pain 60 tablet 11    )  ( Diabetes Eval:    )    ( Pain Eval:  Data Unavailable )    ( Wound Eval:       )    (   History   Smoking Status     Never Smoker   Smokeless Tobacco     Never Used    )    ( Signed By:  " Wilber Kevin; March 8, 2018; 1:00 PM )

## 2018-03-08 NOTE — MR AVS SNAPSHOT
"                  MRN:[id number]                      After Visit Summary   3/8/2018    Cristobal Darnell    MRN: [id number]           Visit Information        Provider Department      3/8/2018 1:00 PM Delaney Huerta RD [facility]        Care Instructions    GOALS:  Relating To Eating:  Eat slowly (20-30 minutes per meal), chewing foods well (25 chews per bite/applesauce consistency)  9\" Plate method (1/2 plate non-starchy vegetables/fruit, 1/4 plate lean protein, 1/4 plate whole grain starch - no more than 1/2 cup carb/meal)  Avoid snacking    Relating to beverages:  Reduce caffeine/carbonation/calorie containing beverages  Separate fluids from meals by 30 minutes before, during, and after eating    Relating to dietary supplements:  Start a multivitamin containing iron daily    Relating to activity:  Increase activity as able    Relating to cravings:  Practice alternatives to emotion eating    Delaney Huerta RD, LD  Follow up in one month  If you need to schedule or reschedule with a dietitian please call [phone].           [employer] Information     [employer]istic is an electronic gateway that provides easy, online access to your medical records. With [employer]implistic, you can request a clinic appointment, read your test results, renew a prescription or communicate with your care team.     To sign up for [employer] visit the website at [email or web address]   You will be asked to enter the access code listed below, as well as some personal information. Please follow the directions to create your username and password.     Your access code is: [email or web address]  Expires: 2018  2:08 PM     Your access code will  in 90 days. If you need help or a new code, please contact your [facility] or call [phone] for assistance.        Care EveryWhere ID     This is your Care EveryWhere ID. This could be used by other organizations to access your [facility] medical " records  [id number]        Equal Access to Services     DUNCAN TORRES : Karishma Donald, esa aguilar, gonsalo canales, fan chicas. So [facility] [phone].    ATENCIÓN: Si habla español, tiene a wall disposición servicios gratuitos de asistencia lingüística. Llame al [phone].    We comply with applicable federal civil rights laws and [place] laws. We do not discriminate on the basis of race, color, national origin, age, disability, sex, sexual orientation, or gender identity.

## 2018-03-09 ASSESSMENT — PATIENT HEALTH QUESTIONNAIRE - PHQ9: SUM OF ALL RESPONSES TO PHQ QUESTIONS 1-9: 18

## 2018-03-09 NOTE — TELEPHONE ENCOUNTER
Confirmed fax of letter to the provided fax number and routed to Mehnaz Wiggins via EPIC per request. Radha Zaidi,

## 2018-03-27 ENCOUNTER — OFFICE VISIT (OUTPATIENT)
Dept: ENDOCRINOLOGY | Facility: CLINIC | Age: 43
End: 2018-03-27

## 2018-03-27 VITALS
DIASTOLIC BLOOD PRESSURE: 78 MMHG | WEIGHT: 177.2 LBS | TEMPERATURE: 98.5 F | BODY MASS INDEX: 34.79 KG/M2 | HEIGHT: 60 IN | SYSTOLIC BLOOD PRESSURE: 122 MMHG | OXYGEN SATURATION: 96 % | HEART RATE: 86 BPM

## 2018-03-27 DIAGNOSIS — E66.09 CLASS 1 OBESITY DUE TO EXCESS CALORIES WITHOUT SERIOUS COMORBIDITY WITH BODY MASS INDEX (BMI) OF 34.0 TO 34.9 IN ADULT: Primary | ICD-10-CM

## 2018-03-27 DIAGNOSIS — E66.811 CLASS 1 OBESITY DUE TO EXCESS CALORIES WITHOUT SERIOUS COMORBIDITY WITH BODY MASS INDEX (BMI) OF 34.0 TO 34.9 IN ADULT: Primary | ICD-10-CM

## 2018-03-27 ASSESSMENT — ENCOUNTER SYMPTOMS
NERVOUS/ANXIOUS: 1
DECREASED CONCENTRATION: 1
DEPRESSION: 1
SPEECH CHANGE: 1
EYE IRRITATION: 1
INSOMNIA: 1
PANIC: 1

## 2018-03-27 ASSESSMENT — PAIN SCALES - GENERAL: PAINLEVEL: NO PAIN (0)
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
## 2018-03-27 NOTE — MR AVS SNAPSHOT
After Visit Summary   3/27/2018    Cristobal Darnell    MRN: [id number]           Patient Information     Date Of Birth          1975        Visit Information        Provider Department      3/27/2018 2:15 PM Reyna Dawson MD M [facility]        Today's Diagnoses     Class 1 obesity due to excess calories without serious comorbidity with body mass index (BMI) of 34.0 to 34.9 in adult    -  1      Care Instructions    - start           Follow-ups after your visit        Your next 10 appointments already scheduled     Apr 10, 2018  1:30 PM CDT   (Arrive by 1:15 PM)   NUTRITION VISIT with SONIA Morfin [facility] ([facility])    [address]  [address] [facility] [address] [phone]              Who to contact     Please call your clinic at [phone] to:    Ask questions about your health    Make or cancel appointments    Discuss your medicines    Learn about your test results    Speak to your doctor            Additional Information About Your Visit        MyChart Information     SecureDB is an electronic gateway that provides easy, online access to your medical records. With SecureDB, you can request a clinic appointment, read your test results, renew a prescription or communicate with your care team.     To sign up for SecureDB visit the website at [email or web address]   You will be asked to enter the access code listed below, as well as some personal information. Please follow the directions to create your username and password.     Your access code is: [email or web address]  Expires: 2018  3:08 PM     Your access code will  in 90 days. If you need help or a new code, please contact your [facility] or call [phone] for assistance.        Care EveryWhere ID     This is your Care EveryWhere ID. This could be used by other organizations to access your  [place] medical records  [id number]        Your Vitals Were     Pulse Temperature Height Pulse Oximetry BMI (Body Mass Index)       86 98.5  F (36.9  C) (Oral) 1.524 m (5') 96% 34.61 kg/m2        Blood Pressure from Last 3 Encounters:   03/27/18 122/78   03/08/18 118/75   09/25/17 112/70    Weight from Last 3 Encounters:   03/27/18 80.4 kg (177 lb 3.2 oz)   03/08/18 82.1 kg (181 lb 1.6 oz)   09/25/17 79.8 kg (176 lb)              Today, you had the following     No orders found for display       Primary Care Provider Office Phone # Fax #    Shania Yeung [phone]       [address]  [place] [address]        Equal Access to Services     [facility]: Hadii agustin knapp Soleobardo, waaxda luqadaha, qaybta kaalmada adekathleenyada, fan wiley . So [facility] [phone].    ATENCIÓN: Si habla español, tiene a wall disposición servicios gratuitos de asistencia lingüística. Llame al [phone].    We comply with applicable federal civil rights laws and [place] laws. We do not discriminate on the basis of race, color, national origin, age, disability, sex, sexual orientation, or gender identity.            Thank you!     Thank you for choosing [facility]  for your care. Our goal is always to provide you with excellent care. Hearing back from our patients is one way we can continue to improve our services. Please take a few minutes to complete the written survey that you may receive in the mail after your visit with us. Thank you!             Your Updated Medication List - Protect others around you: Learn how to safely use, store and throw away your medicines at www.disposemymeds.org.          This list is accurate as of 3/27/18 11:59 PM.  Always use your most recent med list.                   Brand Name Dispense Instructions for use Diagnosis    buPROPion 300 MG 24 hr tablet    WELLBUTRIN XL    90 tablet    Take 1 tablet (300 mg) by mouth  every morning    Major depressive disorder, single episode, moderate degree (H)       cholecalciferol 1000 UNIT tablet    vitamin D3    90 tablet    Take 1 tablet (1,000 Units) by mouth daily    Dietary deficiency       cyanocobalamin 1000 MCG tablet    vitamin  B-12    90 tablet    Take 1 tablet (1,000 mcg) by mouth daily    Vitamin B 12 deficiency       DAILY CHRISTIAN Tabs     90 tablet    Take 1 tablet by mouth daily    Dietary deficiency       fish oil-omega-3 fatty acids 1000 MG capsule     90 each    TAKE ONE CAPSULE BY MOUTH ONCE DAILY    HDL lipoprotein deficiency       naproxen 500 MG tablet    NAPROSYN    60 tablet    Take 1 tablet (500 mg) by mouth 2 times daily as needed for moderate pain    Chronic low back pain, unspecified back pain laterality, with sciatica presence unspecified, Chondromalacia of both patellae       topiramate 25 MG tablet    TOPAMAX    90 tablet    25mg at bedtime for week 1, 50mg at bedtime for 1 week, and 75mg at bedtime thereafter    Class 2 obesity without serious comorbidity with body mass index (BMI) of 35.0 to 35.9 in adult, unspecified obesity type       triamcinolone 0.1 % cream    KENALOG    45 g    Apply to affected area sparingly twice daily as needed    Intertrigo
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
## 2018-03-27 NOTE — NURSING NOTE
Preventive Care:     Eye Exam Screening: During our visit today, we discussed that it is recommended you receive eye exam screening. Please call or make an appointment with your primary care provider to discuss this with them. You may also call the [facility] scheduling line ([phone]) to set up an eye exam at one of the [facility].

## 2018-03-28 ASSESSMENT — PATIENT HEALTH QUESTIONNAIRE - PHQ9: SUM OF ALL RESPONSES TO PHQ QUESTIONS 1-9: 10

## 2018-04-09 DIAGNOSIS — F32.9 MDD (MAJOR DEPRESSIVE DISORDER): Primary | ICD-10-CM

## 2018-04-11 ENCOUNTER — TELEPHONE (OUTPATIENT)
Dept: INTERNAL MEDICINE | Facility: CLINIC | Age: 43
End: 2018-04-11

## 2018-04-11 NOTE — TELEPHONE ENCOUNTER
Writer spoke with client about her score of 10 on the PHQ 9 at her visit to the weight management clinic on 3/27 indicating symptoms of depression. Client declined services at this time, but was given my number and encouraged to call me if she changed her mind.

## 2018-07-20 DIAGNOSIS — E66.812 CLASS 2 OBESITY WITHOUT SERIOUS COMORBIDITY WITH BODY MASS INDEX (BMI) OF 35.0 TO 35.9 IN ADULT, UNSPECIFIED OBESITY TYPE: ICD-10-CM

## 2018-07-20 NOTE — TELEPHONE ENCOUNTER
"Routing to prescribing provider.    Routing refill request to provider for review/approval because:  Labs not current:  CBC, ALT, AST    Requested Prescriptions   Pending Prescriptions Disp Refills     topiramate (TOPAMAX) 25 MG tablet 90 tablet 3     Simg at bedtime for week 1, 50mg at bedtime for 1 week, and 75mg at bedtime thereafter    Anti-Seizure Meds Protocol  Failed    2018  3:03 PM       Failed - Review Authorizing provider's last note.     Refer to last progress notes: confirm request is for original authorizing provider (cannot be through other providers).         Failed - Normal CBC on file in past 26 months    Recent Labs   Lab Test  12   1553   WBC  10.9   RBC  4.45   HGB  13.0   HCT  36.6   PLT  224       For [facility] ONLY: GSYZ589 = WBC, FORV322 = RBC         Failed - Normal ALT or AST on file in past 26 months    Recent Labs   Lab Test  12   1553   ALT  14     Recent Labs   Lab Test  12   1553   AST  29            Failed - Normal platelet count on file in past 26 months    Recent Labs   Lab Test  12   1553   PLT  224              Passed - Recent (12 mo) or future (30 days) visit within the authorizing provider's specialty    Patient had office visit in the last 12 months or has a visit in the next 30 days with authorizing provider or within the authorizing provider's specialty.  See \"Patient Info\" tab in inbasket, or \"Choose Columns\" in Meds & Orders section of the refill encounter.           Passed - No active pregnancy on record       Passed - No positive pregnancy test in last 12 months          Melita Nair RN  [facility]      "

## 2018-07-20 NOTE — TELEPHONE ENCOUNTER
Called and left a message for Rabab to call back. Need to know which pharmacy the patient is using. Radha Zaidi,     Please get this information if the patient returns call and send it to the care team. Thank you.

## 2018-07-20 NOTE — TELEPHONE ENCOUNTER
Reason for Call:  Medication or medication refill:    Do you use a [facility]?  Name of the pharmacy and phone number for the current request :topiramate (TOPAMAX) 25 MG tablet     Name of the medication requested: topiramate (TOPAMAX) 25 MG tablet    Other request: Patient is out of medication and needs a refill. Patient has appt with Dr. Yeung 08/09/18. Please call    Can we leave a detailed message on this number? YES    Phone number patient can be reached at: Home number on file [phone] (home)    Best Time: ASAP    Call taken on 7/20/2018 at 11:22 AM by Mehnaz Lawrence

## 2018-07-20 NOTE — TELEPHONE ENCOUNTER
Reason for Call:  Other prescription    Detailed comments: patient returning call, to give pharmacy info. Patient asking for a return call once the prescription has been faxed.      [facility] [id number] [place] [address] [address] [address]  [address]  [facility] [address]  Phone: [phone] Fax: [phone]            Phone Number Patient can be reached at: Home number on file [phone] (home)    Best Time: any time    Can we leave a detailed message on this number? YES    Call taken on 7/20/2018 at 2:51 PM by Ayesha Fox

## 2018-07-22 RX ORDER — TOPIRAMATE 25 MG/1
TABLET, FILM COATED ORAL
Qty: 90 TABLET | Refills: 3 | Status: SHIPPED | OUTPATIENT
Start: 2018-07-22 | End: 2018-08-09

## 2018-07-26 ENCOUNTER — TELEPHONE (OUTPATIENT)
Dept: FAMILY MEDICINE | Facility: CLINIC | Age: 43
End: 2018-07-26

## 2018-07-26 DIAGNOSIS — E63.9 DIETARY DEFICIENCY: ICD-10-CM

## 2018-07-26 DIAGNOSIS — M22.42 CHONDROMALACIA OF BOTH PATELLAE: ICD-10-CM

## 2018-07-26 DIAGNOSIS — M54.5 CHRONIC LOW BACK PAIN, UNSPECIFIED BACK PAIN LATERALITY, WITH SCIATICA PRESENCE UNSPECIFIED: ICD-10-CM

## 2018-07-26 DIAGNOSIS — G89.29 CHRONIC LOW BACK PAIN, UNSPECIFIED BACK PAIN LATERALITY, WITH SCIATICA PRESENCE UNSPECIFIED: ICD-10-CM

## 2018-07-26 DIAGNOSIS — F32.1 MAJOR DEPRESSIVE DISORDER, SINGLE EPISODE, MODERATE DEGREE (H): ICD-10-CM

## 2018-07-26 DIAGNOSIS — E78.6 HDL LIPOPROTEIN DEFICIENCY: ICD-10-CM

## 2018-07-26 DIAGNOSIS — E53.8 VITAMIN B 12 DEFICIENCY: ICD-10-CM

## 2018-07-26 DIAGNOSIS — M22.41 CHONDROMALACIA OF BOTH PATELLAE: ICD-10-CM

## 2018-07-26 DIAGNOSIS — L30.4 INTERTRIGO: ICD-10-CM

## 2018-07-26 DIAGNOSIS — E66.812 CLASS 2 OBESITY WITHOUT SERIOUS COMORBIDITY WITH BODY MASS INDEX (BMI) OF 35.0 TO 35.9 IN ADULT, UNSPECIFIED OBESITY TYPE: ICD-10-CM

## 2018-07-26 RX ORDER — CHLORAL HYDRATE 500 MG
CAPSULE ORAL
Qty: 90 EACH | Refills: 3 | Status: CANCELLED | OUTPATIENT
Start: 2018-07-26

## 2018-07-26 RX ORDER — TOPIRAMATE 25 MG/1
TABLET, FILM COATED ORAL
Qty: 90 TABLET | Refills: 3 | Status: CANCELLED | OUTPATIENT
Start: 2018-07-26

## 2018-07-26 RX ORDER — MULTIVITAMIN
1 TABLET ORAL DAILY
Qty: 90 TABLET | Refills: 3 | Status: CANCELLED | OUTPATIENT
Start: 2018-07-26

## 2018-07-26 RX ORDER — TRIAMCINOLONE ACETONIDE 1 MG/G
CREAM TOPICAL
Qty: 45 G | Refills: 1 | Status: CANCELLED | OUTPATIENT
Start: 2018-07-26

## 2018-07-26 RX ORDER — NAPROXEN 500 MG/1
500 TABLET ORAL 2 TIMES DAILY PRN
Qty: 60 TABLET | Refills: 11 | Status: CANCELLED | OUTPATIENT
Start: 2018-07-26

## 2018-07-27 RX ORDER — BUPROPION HYDROCHLORIDE 300 MG/1
300 TABLET ORAL EVERY MORNING
Qty: 30 TABLET | Refills: 0 | Status: SHIPPED | OUTPATIENT
Start: 2018-07-27 | End: 2018-08-09

## 2018-07-27 RX ORDER — LANOLIN ALCOHOL/MO/W.PET/CERES
1000 CREAM (GRAM) TOPICAL DAILY
Qty: 90 TABLET | Refills: 0 | Status: SHIPPED | OUTPATIENT
Start: 2018-07-27 | End: 2018-08-09

## 2018-08-02 ENCOUNTER — TRANSFERRED RECORDS (OUTPATIENT)
Dept: HEALTH INFORMATION MANAGEMENT | Facility: CLINIC | Age: 43
End: 2018-08-02

## 2018-08-09 ENCOUNTER — OFFICE VISIT (OUTPATIENT)
Dept: FAMILY MEDICINE | Facility: CLINIC | Age: 43
End: 2018-08-09
Payer: COMMERCIAL

## 2018-08-09 VITALS
DIASTOLIC BLOOD PRESSURE: 70 MMHG | HEIGHT: 60 IN | BODY MASS INDEX: 33.38 KG/M2 | WEIGHT: 170 LBS | SYSTOLIC BLOOD PRESSURE: 128 MMHG | RESPIRATION RATE: 18 BRPM | OXYGEN SATURATION: 99 % | TEMPERATURE: 97.8 F | HEART RATE: 77 BPM

## 2018-08-09 DIAGNOSIS — F32.1 MAJOR DEPRESSIVE DISORDER, SINGLE EPISODE, MODERATE DEGREE (H): Primary | ICD-10-CM

## 2018-08-09 DIAGNOSIS — E78.6 HDL LIPOPROTEIN DEFICIENCY: ICD-10-CM

## 2018-08-09 DIAGNOSIS — G56.03 BILATERAL CARPAL TUNNEL SYNDROME: ICD-10-CM

## 2018-08-09 DIAGNOSIS — E53.8 VITAMIN B 12 DEFICIENCY: ICD-10-CM

## 2018-08-09 DIAGNOSIS — M54.5 CHRONIC LOW BACK PAIN, UNSPECIFIED BACK PAIN LATERALITY, WITH SCIATICA PRESENCE UNSPECIFIED: ICD-10-CM

## 2018-08-09 DIAGNOSIS — M26.609 TMJ (TEMPOROMANDIBULAR JOINT DISORDER): ICD-10-CM

## 2018-08-09 DIAGNOSIS — M22.2X9 PATELLOFEMORAL DISORDER, UNSPECIFIED LATERALITY: ICD-10-CM

## 2018-08-09 DIAGNOSIS — G89.29 CHRONIC LOW BACK PAIN, UNSPECIFIED BACK PAIN LATERALITY, WITH SCIATICA PRESENCE UNSPECIFIED: ICD-10-CM

## 2018-08-09 DIAGNOSIS — E63.9 DIETARY DEFICIENCY: ICD-10-CM

## 2018-08-09 PROCEDURE — 99214 OFFICE O/P EST MOD 30 MIN: CPT | Performed by: FAMILY MEDICINE

## 2018-08-09 RX ORDER — BUPROPION HYDROCHLORIDE 150 MG/1
300 TABLET ORAL EVERY MORNING
Qty: 180 TABLET | Refills: 0 | Status: SHIPPED | OUTPATIENT
Start: 2018-08-09 | End: 2019-01-23

## 2018-08-09 RX ORDER — BUPROPION HYDROCHLORIDE 300 MG/1
300 TABLET ORAL EVERY MORNING
Qty: 90 TABLET | Refills: 0 | Status: SHIPPED | OUTPATIENT
Start: 2018-08-09 | End: 2018-08-09

## 2018-08-09 RX ORDER — LANOLIN ALCOHOL/MO/W.PET/CERES
1000 CREAM (GRAM) TOPICAL DAILY
Qty: 90 TABLET | Refills: 3 | Status: SHIPPED | OUTPATIENT
Start: 2018-08-09 | End: 2019-11-09

## 2018-08-09 RX ORDER — CHLORAL HYDRATE 500 MG
CAPSULE ORAL
Qty: 90 EACH | Refills: 3 | Status: SHIPPED | OUTPATIENT
Start: 2018-08-09 | End: 2020-12-28

## 2018-08-09 RX ORDER — MULTIVITAMIN
1 TABLET ORAL DAILY
Qty: 90 TABLET | Refills: 3 | Status: SHIPPED | OUTPATIENT
Start: 2018-08-09 | End: 2019-09-23

## 2018-08-09 RX ORDER — SERTRALINE HYDROCHLORIDE 25 MG/1
25 TABLET, FILM COATED ORAL DAILY
Qty: 30 TABLET | Refills: 0 | Status: SHIPPED | OUTPATIENT
Start: 2018-08-09 | End: 2019-01-23

## 2018-08-09 NOTE — MR AVS SNAPSHOT
After Visit Summary   8/9/2018    Cristobal Darnell    MRN: [id number]           Patient Information     Date Of Birth          1975        Visit Information        Provider Department      8/9/2018 10:20 AM Shania Yeung MD [facility]        Today's Diagnoses     Major depressive disorder, single episode, moderate degree (H)    -  1    TMJ (temporomandibular joint disorder)        Chronic low back pain, unspecified back pain laterality, with sciatica presence unspecified        Patellofemoral disorder, unspecified laterality        Bilateral carpal tunnel syndrome        Vitamin B 12 deficiency        HDL lipoprotein deficiency        Dietary deficiency          Care Instructions    [facility]    If you have any questions regarding to your visit please contact your care team:       Team Red:   Clinic Hours Telephone Number   Dr. Shania Hernandez, NP   7am-7pm  Monday - Thursday   7am-5pm  Fridays  [phone]  (Appointment scheduling available 24/7)    Questions about your recent visit?   Team Line  [phone]   Urgent [facility] - [place] [facility] - 11am-9pm Monday-Friday Saturday-Sunday- 9am-5pm   [place] - 5pm-9pm Monday-Friday Saturday-Sunday- 9am-5pm  [phone] - [place]  [phone] - [place]       What options do I have for a visit other than an office visit? We offer electronic visits (e-visits) and telephone visits, when medically appropriate.  Please check with your medical insurance to see if these types of visits are covered, as you will be responsible for any charges that are not paid by your insurance.      You can use [employer] (secure electronic communication) to access to your chart, send your primary care provider a message, or make an appointment. Ask a team member how to get started.     For a price quote for your services, please call our Consumer Price Line at  [phone] or our Imaging Cost estimation line at [phone] (for imaging tests).  Sandie ALVAREZ MA      When You Have Temporomandibular Disorder (TMD)  The temporomandibular joint (TMJ) is a ball-and-socket joint located where the upper and lower jaws meet. The TMJ and its nearby muscles make up a complex, loosely connected system. Because of this, a problem in one part of the system can affect the other parts. This can cause you to have temporomandibular disorder (TMD).         How the temporomandibular joint works  You have 1 joint on each side of your mouth that together make up the TMJ. These joints are part of a large group of muscles, ligaments, and bones that work together as a system. When the system is healthy, you can talk, chew, and even yawn in comfort. Muscles contract and relax to open and close the joint. The disk is made of cartilage and is located between the condyle and the skull. It absorbs pressure in the joint and allows the jaws to open and close smoothly. Fluid (called synovial fluid) lubricates the joints. Ligaments connect the lower jaw bones to the skull. They also support the joint.  Common temporomandibular problems  When there is a problem with the TMJ and its related system, you can develop TMD. Common TMD problems include tight muscles, inflamed joints, and damaged joints.  In some cases, symptoms may be related to the teeth or bite.  Tight muscles  The muscles surrounding the TMJ can tighten (spasm) and cause pain.    Referred pain happens in a part of the body separate from the source of the problem. For example, pain in the face or teeth could be coming from a problem in the TMJ.    Myofascial pain happens in soft tissues, like muscle. Trigger points in these pain areas often cause referred pain. You may feel jaw, neck, or shoulder pain.  Inflamed joints  Inflammation may include pain, redness, heat, swelling, or loss of function.    Synovitis happens when certain tissues  surrounding the TMJ become inflamed. It causes pain that increases with jaw movement.    Inflamed ligaments can be caused by strain or injury. When this happens, the ligaments are unable to support the joint.    Rheumatoid arthritis is a joint disease. It leads to inflammation in the TMJ.  Damaged joints  Many people hear clicking when their jaw moves. If you feel pain along with the noise, the joint may be damaged.    Impingement happens when the disk slips out of place (displacement). This causes the jaw to catch. As the disk slips, you may hear a clicking sound.    Locked jaw happens when the disk gets stuck in one position. As a result, the jaw locks open or closed.    Osteoarthritis is a joint disease. It causes the TMJ to wear away (degenerate). This leads to pain during movement.  Other problems  The parts of the jaw and mouth make up a single unit. That s why a problem in 1 area can cause symptoms elsewhere. Teeth or bite problems linked to TMD include:    Grinding your teeth side to side (bruxism)    Biting down on your teeth (clenching)    When the teeth or bite is out of alignment (malocclusion)  Your healthcare provider will give you more information about these problems if needed.   Date Last Reviewed: 8/1/2017 2000-2017 [employer] [address] [place] [address], [place] [id number]. All rights reserved. This information is not intended as a substitute for professional medical care. Always follow your healthcare professional's instructions.        Helping Your Temporomandibular Joint (TMJ) Heal  The temporomandibular joint (TMJ) is a ball-and-socket joint located where the upper and lower jaws meet. When the TMJ and related muscles are injured, they need time to heal. Self-care is very important. You can take steps to reduce pressure on the TMJ and speed healing.    Eating with care  Chewing strains the TMJ. When symptoms are bad, you may not be able to chew at all. To get you through times  when your symptoms are at their worst, try these tips:    Choose soft foods. These include scrambled eggs, oatmeal, yogurt, quiche, tofu, soup, smoothies, pasta, fish, mashed potatoes, milkshakes, bananas, applesauce, gelatin, or ice cream.    Don t bite into hard foods. These include whole apples, carrots, and corn on the cob. Instead cut foods into bite-sized pieces.    Grind or finely chop meats and other tough foods. Try hamburger meat instead of steak.  Using ice and heat  Your healthcare provider may suggest using ice and heat. Ice helps reduce swelling and pain. Heat helps relax muscles, increasing blood flow.    Use a gel pack or cold pack for severe pain. Apply for 10 to 20 minutes. Repeat as needed. To make a cold pack, put ice cubes in a plastic bag that seals at the top. Wrap the bag in a clean, thin towel or cloth. Never put ice or a cold pack directly on the skin.    Use moist heat for mild to moderate muscle pain. Apply a moist, warm towel to the muscles for 10 to 20 minutes. Repeat as needed.  Staying away from triggers  Certain activities (called triggers) strain the TMJ, making symptoms worse. The tips below can help you avoid common triggers and limit strain.    Don t eat hard or chewy foods. These include nuts, pretzels, popcorn, chips, gum, caramel, gummy candies, carrots, whole apples, hard breads, and even ice.    Reschedule routine dental visits, like cleanings, if your jaw aches. If you have severe pain, call your healthcare provider.    Support your jaw when yawning. When you feel a yawn coming on, put a fist under your jaw. Apply gentle pressure. This helps prevent wide, painful yawns.    Don t do any activity that hurts. This includes nail biting, yelling, and singing.  Maintaining good posture  Work at improving your posture during the day and when you sleep. Good posture can help your body heal. Try these tips:    Use a headset when on the phone. Don t cradle the phone with your  shoulder.    Keep ergonomics in mind. This includes making sure your workstation fits your body. Support your lower back. Take breaks often to stretch and rest. If you use a computer, keep the monitor at eye level.    Keep your head in a neutral position.  Keep your ears in line with your shoulders. Don t slouch or crane your head forward.    Use an orthopedic pillow. Use this to support your head and neck during sleep.  Date Last Reviewed: 8/1/2017 2000-2017 [employer]. [address] [place] [address]. All rights reserved. This information is not intended as a substitute for professional medical care. Always follow your healthcare professional's instructions.                Follow-ups after your visit        Additional Services     HEMA PT, HAND, AND CHIROPRACTIC REFERRAL       **This order will print in the [facility] Scheduling Office**    Physical Therapy, Hand Therapy and Chiropractic Care are available through:    *[facility]  *[facility]  *[facility]    Call one number to schedule at any of the above locations: [phone].    Your provider has referred you to: As Indicated:      Indication/Reason for Referral:    Onset of Illness:    Therapy Orders: Evaluate and Treat  Special Programs: None  Special Request: None    Sue Rogers      Additional Comments for the Therapist or Chiropractor:      Please be aware that coverage of these services is subject to the terms and limitations of your health insurance plan.  Call member services at your health plan with any benefit or coverage questions.      Please bring the following to your appointment:    *Your personal calendar for scheduling future appointments  *Comfortable clothing            MENTAL HEALTH REFERRAL  - Adult; Psychiatry and Medication Management; Psychiatry; Other: [facility] [phone]; We will contact you to schedule the appointment or please  call with any questions       All scheduling is subject to the client's specific insurance plan & benefits, provider/location availability, and provider clinical specialities.  Please arrive 15 minutes early for your first appointment and bring your completed paperwork.    Please be aware that coverage of these services is subject to the terms and limitations of your health insurance plan.  Call member services at your health plan with any benefit or coverage questions.                      SPORTS MEDICINE REFERRAL       Your provider has referred you to:  [facility]: [facility]  [phone]   [email or web address]    Please be aware that coverage of these services is subject to the terms and limitations of your health insurance plan.  Call member services at your health plan with any benefit or coverage questions.      Please bring the following to your appointment:    >>   Any x-rays, CTs or MRIs which have been performed.  Contact the facility where they were done to arrange for  prior to your scheduled appointment.    >>   List of current medications   >>   This referral request   >>   Any documents/labs given to you for this referral                  Follow-up notes from your care team     Return in about 1 month (around 9/9/2018) for depression.      Who to contact     If you have questions or need follow up information about today's clinic visit or your schedule please contact [facility] directly at [phone].  Normal or non-critical lab and imaging results will be communicated to you by MyChart, letter or phone within 4 business days after the clinic has received the results. If you do not hear from us within 7 days, please contact the clinic through MyChart or phone. If you have a critical or abnormal lab result, we will notify you by phone as soon as possible.  Submit  refill requests through [employer] or call your pharmacy and they will forward the refill request to us. Please allow 3 business days for your refill to be completed.          Additional Information About Your Visit        Care EveryWhere ID     This is your Care EveryWhere ID. This could be used by other organizations to access your [place] medical records  [id number]        Your Vitals Were     Pulse Temperature Respirations Height Last Period Pulse Oximetry    77 97.8  F (36.6  C) (Oral) 18 5' (1.524 m) 07/26/2018 (Approximate) 99%    Breastfeeding? BMI (Body Mass Index)                No 33.2 kg/m2           Blood Pressure from Last 3 Encounters:   08/09/18 128/70   03/27/18 122/78   03/08/18 118/75    Weight from Last 3 Encounters:   08/09/18 170 lb (77.1 kg)   03/27/18 177 lb 3.2 oz (80.4 kg)   03/08/18 181 lb 1.6 oz (82.1 kg)              We Performed the Following     HEMA PT, HAND, AND CHIROPRACTIC REFERRAL     MENTAL HEALTH REFERRAL  - Adult; Psychiatry and Medication Management; Psychiatry; Other: [facility] [phone]; We will contact you to schedule the appointment or please call with any questions     SPORTS MEDICINE REFERRAL          Today's Medication Changes          These changes are accurate as of 8/9/18 11:07 AM.  If you have any questions, ask your nurse or doctor.               Start taking these medicines.        Dose/Directions    order for DME   Used for:  Bilateral carpal tunnel syndrome   Started by:  Shania Yeung MD        Equipment being ordered: nighttime wrist splints - medium - no thumb   Quantity:  2 Device   Refills:  0       sertraline 25 MG tablet   Commonly known as:  ZOLOFT   Used for:  Major depressive disorder, single episode, moderate degree (H)   Started by:  Shania Yeung MD        Dose:  25 mg   Take 1 tablet (25 mg) by mouth daily   Quantity:  30 tablet   Refills:  0         These medicines have changed or have updated prescriptions.         Dose/Directions    buPROPion 150 MG 24 hr tablet   Commonly known as:  WELLBUTRIN XL   This may have changed:    - medication strength  - additional instructions   Used for:  Major depressive disorder, single episode, moderate degree (H)   Changed by:  Shania Yeung MD        Dose:  300 mg   Take 2 tablets (300 mg) by mouth every morning   Quantity:  180 tablet   Refills:  0            Where to get your medicines      These medications were sent to [id number] - [place] - [address] [facility] [address] [place] [address]  [address], [place] [address]    Hours:  Test fax successful 12/11/02   Phone:  [phone]     buPROPion 150 MG 24 hr tablet    cholecalciferol 1000 UNIT tablet    cyanocobalamin 1000 MCG tablet    DAILY CHRISTIAN Tabs    sertraline 25 MG tablet         Some of these will need a paper prescription and others can be bought over the counter.  Ask your nurse if you have questions.     Bring a paper prescription for each of these medications     fish oil-omega-3 fatty acids 1000 MG capsule    order for DME                Primary Care Provider Office Phone # Fax #    Shania Yeung [phone]       [address] [facility] [address]        Equal Access to Services     TARAS TORRES AH: Hadii agustin cobian hadasho Soomaali, waaxda luqadaha, qaybta kaalmada adeegyada, fan chicas. So [facility] [phone].    ATENCIÓN: Si habla español, tiene a wall disposición servicios gratuitos de asistencia lingüística. Llame al [phone].    We comply with applicable federal civil rights laws and [place] laws. We do not discriminate on the basis of race, color, national origin, age, disability, sex, sexual orientation, or gender identity.            Thank you!     Thank you for choosing [facility]  for your care. Our goal is always to provide you with excellent care. Hearing back from our patients is one way we can continue  to improve our services. Please take a few minutes to complete the written survey that you may receive in the mail after your visit with us. Thank you!             Your Updated Medication List - Protect others around you: Learn how to safely use, store and throw away your medicines at www.disposemymeds.org.          This list is accurate as of 8/9/18 11:07 AM.  Always use your most recent med list.                   Brand Name Dispense Instructions for use Diagnosis    buPROPion 150 MG 24 hr tablet    WELLBUTRIN XL    180 tablet    Take 2 tablets (300 mg) by mouth every morning    Major depressive disorder, single episode, moderate degree (H)       cholecalciferol 1000 UNIT tablet    vitamin D3    90 tablet    Take 1 tablet (1,000 Units) by mouth daily    Dietary deficiency       cyanocobalamin 1000 MCG tablet    vitamin  B-12    90 tablet    Take 1 tablet (1,000 mcg) by mouth daily    Vitamin B 12 deficiency       DAILY CHRISTIAN Tabs     90 tablet    Take 1 tablet by mouth daily    Dietary deficiency       fish oil-omega-3 fatty acids 1000 MG capsule     90 each    TAKE ONE CAPSULE BY MOUTH ONCE DAILY    HDL lipoprotein deficiency       naproxen 500 MG tablet    NAPROSYN    60 tablet    Take 1 tablet (500 mg) by mouth 2 times daily as needed for moderate pain    Chronic low back pain, unspecified back pain laterality, with sciatica presence unspecified, Chondromalacia of both patellae       order for DME     2 Device    Equipment being ordered: nighttime wrist splints - medium - no thumb    Bilateral carpal tunnel syndrome       sertraline 25 MG tablet    ZOLOFT    30 tablet    Take 1 tablet (25 mg) by mouth daily    Major depressive disorder, single episode, moderate degree (H)

## 2018-08-09 NOTE — Clinical Note
Please abstract the following data from this visit with this patient into the appropriate field in Epic:  HPV 7/13/17 in Care Everywhere - normal

## 2018-08-09 NOTE — LETTER
My Depression Action Plan  Name: Cristobal Darnell   Date of Birth 1975  Date: 8/9/2018    My doctor: Shania Yeung   My clinic: [address]  [place] [address]  [phone]          GREEN    ZONE   Good Control    What it looks like:     Things are going generally well. You have normal up s and down s. You may even feel depressed from time to time, but bad moods usually last less than a day.   What you need to do:  1. Continue to care for yourself (see self care plan)  2. Check your depression survival kit and update it as needed  3. Follow your physician s recommendations including any medication.  4. Do not stop taking medication unless you consult with your physician first.           YELLOW         ZONE Getting Worse    What it looks like:     Depression is starting to interfere with your life.     It may be hard to get out of bed; you may be starting to isolate yourself from others.    Symptoms of depression are starting to last most all day and this has happened for several days.     You may have suicidal thoughts but they are not constant.   What you need to do:     1. Call your care team, your response to treatment will improve if you keep your care team informed of your progress. Yellow periods are signs an adjustment may need to be made.     2. Continue your self-care, even if you have to fake it!    3. Talk to someone in your support network    4. Open up your depression survival kit           RED    ZONE Medical Alert - Get Help    What it looks like:     Depression is seriously interfering with your life.     You may experience these or other symptoms: You can t get out of bed most days, can t work or engage in other necessary activities, you have trouble taking care of basic hygiene, or basic responsibilities, thoughts of suicide or death that will not go away, self-injurious behavior.     What you need to do:  1. Call your care team and request  a same-day appointment. If they are not available (weekends or after hours) call your local crisis line, emergency room or 911.            Depression Self Care Plan / Survival Kit    Self-Care for Depression  Here s the deal. Your body and mind are really not as separate as most people think.  What you do and think affects how you feel and how you feel influences what you do and think. This means if you do things that people who feel good do, it will help you feel better.  Sometimes this is all it takes.  There is also a place for medication and therapy depending on how severe your depression is, so be sure to consult with your medical provider and/ or Behavioral Health Consultant if your symptoms are worsening or not improving.     In order to better manage my stress, I will:    Exercise  Get some form of exercise, every day. This will help reduce pain and release endorphins, the  feel good  chemicals in your brain. This is almost as good as taking antidepressants!  This is not the same as joining a gym and then never going! (they count on that by the way ) It can be as simple as just going for a walk or doing some gardening, anything that will get you moving.      Hygiene   Maintain good hygiene (Get out of bed in the morning, Make your bed, Brush your teeth, Take a shower, and Get dressed like you were going to work, even if you are unemployed).  If your clothes don't fit try to get ones that do.    Diet  I will strive to eat foods that are good for me, drink plenty of water, and avoid excessive sugar, caffeine, alcohol, and other mood-altering substances.  Some foods that are helpful in depression are: complex carbohydrates, B vitamins, flaxseed, fish or fish oil, fresh fruits and vegetables.    Psychotherapy  I agree to participate in Individual Therapy (if recommended).    Medication  If prescribed medications, I agree to take them.  Missing doses can result in serious side effects.  I understand that drinking  alcohol, or other illicit drug use, may cause potential side effects.  I will not stop my medication abruptly without first discussing it with my provider.    Staying Connected With Others  I will stay in touch with my friends, family members, and my primary care provider/team.    Use your imagination  Be creative.  We all have a creative side; it doesn t matter if it s oil painting, sand castles, or mud pies! This will also kick up the endorphins.    Witness Beauty  (AKA stop and smell the roses) Take a look outside, even in mid-winter. Notice colors, textures. Watch the squirrels and birds.     Service to others  Be of service to others.  There is always someone else in need.  By helping others we can  get out of ourselves  and remember the really important things.  This also provides opportunities for practicing all the other parts of the program.    Humor  Laugh and be silly!  Adjust your TV habits for less news and crime-drama and more comedy.    Control your stress  Try breathing deep, massage therapy, biofeedback, and meditation. Find time to relax each day.     My support system    Clinic Contact:  Phone number:    Contact 1:  Phone number:    Contact 2:  Phone number:    Jainism/:  Phone number:    Therapist:  Phone number:    Local crisis center:    Phone number:    Other community support:  Phone number:

## 2018-08-09 NOTE — PATIENT INSTRUCTIONS
[facility]    If you have any questions regarding to your visit please contact your care team:       Team Red:   Clinic Hours Telephone Number   Dr. Shania Hernandez, NP   7am-7pm  Monday - Thursday   7am-5pm  Fridays  [phone]  (Appointment scheduling available 24/7)    Questions about your recent visit?   Team Line  [phone]   Urgent Care - [place] and [facility] - 11am-9pm Monday-Friday Saturday-Sunday- 9am-5pm   [place] - 5pm-9pm Monday-Friday Saturday-Sunday- 9am-5pm  [phone] - [place]  [phone] - [place]       What options do I have for a visit other than an office visit? We offer electronic visits (e-visits) and telephone visits, when medically appropriate.  Please check with your medical insurance to see if these types of visits are covered, as you will be responsible for any charges that are not paid by your insurance.      You can use [employer] (secure electronic communication) to access to your chart, send your primary care provider a message, or make an appointment. Ask a team member how to get started.     For a price quote for your services, please call our Consumer Price Line at [phone] or our Imaging Cost estimation line at [phone] (for imaging tests).  Sandie ALVAREZ MA      When You Have Temporomandibular Disorder (TMD)  The temporomandibular joint (TMJ) is a ball-and-socket joint located where the upper and lower jaws meet. The TMJ and its nearby muscles make up a complex, loosely connected system. Because of this, a problem in one part of the system can affect the other parts. This can cause you to have temporomandibular disorder (TMD).         How the temporomandibular joint works  You have 1 joint on each side of your mouth that together make up the TMJ. These joints are part of a large group of muscles, ligaments, and bones that work together as a system. When the system is healthy, you  can talk, chew, and even yawn in comfort. Muscles contract and relax to open and close the joint. The disk is made of cartilage and is located between the condyle and the skull. It absorbs pressure in the joint and allows the jaws to open and close smoothly. Fluid (called synovial fluid) lubricates the joints. Ligaments connect the lower jaw bones to the skull. They also support the joint.  Common temporomandibular problems  When there is a problem with the TMJ and its related system, you can develop TMD. Common TMD problems include tight muscles, inflamed joints, and damaged joints.  In some cases, symptoms may be related to the teeth or bite.  Tight muscles  The muscles surrounding the TMJ can tighten (spasm) and cause pain.    Referred pain happens in a part of the body separate from the source of the problem. For example, pain in the face or teeth could be coming from a problem in the TMJ.    Myofascial pain happens in soft tissues, like muscle. Trigger points in these pain areas often cause referred pain. You may feel jaw, neck, or shoulder pain.  Inflamed joints  Inflammation may include pain, redness, heat, swelling, or loss of function.    Synovitis happens when certain tissues surrounding the TMJ become inflamed. It causes pain that increases with jaw movement.    Inflamed ligaments can be caused by strain or injury. When this happens, the ligaments are unable to support the joint.    Rheumatoid arthritis is a joint disease. It leads to inflammation in the TMJ.  Damaged joints  Many people hear clicking when their jaw moves. If you feel pain along with the noise, the joint may be damaged.    Impingement happens when the disk slips out of place (displacement). This causes the jaw to catch. As the disk slips, you may hear a clicking sound.    Locked jaw happens when the disk gets stuck in one position. As a result, the jaw locks open or closed.    Osteoarthritis is a joint disease. It causes the TMJ to wear  away (degenerate). This leads to pain during movement.  Other problems  The parts of the jaw and mouth make up a single unit. That s why a problem in 1 area can cause symptoms elsewhere. Teeth or bite problems linked to TMD include:    Grinding your teeth side to side (bruxism)    Biting down on your teeth (clenching)    When the teeth or bite is out of alignment (malocclusion)  Your healthcare provider will give you more information about these problems if needed.   Date Last Reviewed: 8/1/2017 2000-2017 [employer]. [address] [place] [address]. All rights reserved. This information is not intended as a substitute for professional medical care. Always follow your healthcare professional's instructions.        Helping Your Temporomandibular Joint (TMJ) Heal  The temporomandibular joint (TMJ) is a ball-and-socket joint located where the upper and lower jaws meet. When the TMJ and related muscles are injured, they need time to heal. Self-care is very important. You can take steps to reduce pressure on the TMJ and speed healing.    Eating with care  Chewing strains the TMJ. When symptoms are bad, you may not be able to chew at all. To get you through times when your symptoms are at their worst, try these tips:    Choose soft foods. These include scrambled eggs, oatmeal, yogurt, quiche, tofu, soup, smoothies, pasta, fish, mashed potatoes, milkshakes, bananas, applesauce, gelatin, or ice cream.    Don t bite into hard foods. These include whole apples, carrots, and corn on the cob. Instead cut foods into bite-sized pieces.    Grind or finely chop meats and other tough foods. Try hamburger meat instead of steak.  Using ice and heat  Your healthcare provider may suggest using ice and heat. Ice helps reduce swelling and pain. Heat helps relax muscles, increasing blood flow.    Use a gel pack or cold pack for severe pain. Apply for 10 to 20 minutes. Repeat as needed. To make a cold pack, put  ice cubes in a plastic bag that seals at the top. Wrap the bag in a clean, thin towel or cloth. Never put ice or a cold pack directly on the skin.    Use moist heat for mild to moderate muscle pain. Apply a moist, warm towel to the muscles for 10 to 20 minutes. Repeat as needed.  Staying away from triggers  Certain activities (called triggers) strain the TMJ, making symptoms worse. The tips below can help you avoid common triggers and limit strain.    Don t eat hard or chewy foods. These include nuts, pretzels, popcorn, chips, gum, caramel, gummy candies, carrots, whole apples, hard breads, and even ice.    Reschedule routine dental visits, like cleanings, if your jaw aches. If you have severe pain, call your healthcare provider.    Support your jaw when yawning. When you feel a yawn coming on, put a fist under your jaw. Apply gentle pressure. This helps prevent wide, painful yawns.    Don t do any activity that hurts. This includes nail biting, yelling, and singing.  Maintaining good posture  Work at improving your posture during the day and when you sleep. Good posture can help your body heal. Try these tips:    Use a headset when on the phone. Don t cradle the phone with your shoulder.    Keep ergonomics in mind. This includes making sure your workstation fits your body. Support your lower back. Take breaks often to stretch and rest. If you use a computer, keep the monitor at eye level.    Keep your head in a neutral position.  Keep your ears in line with your shoulders. Don t slouch or crane your head forward.    Use an orthopedic pillow. Use this to support your head and neck during sleep.  Date Last Reviewed: 8/1/2017 2000-2017 [employer] [address] [place] [address], [place] [address]. All rights reserved. This information is not intended as a substitute for professional medical care. Always follow your healthcare professional's instructions.
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
## 2018-08-10 ENCOUNTER — TELEPHONE (OUTPATIENT)
Dept: FAMILY MEDICINE | Facility: CLINIC | Age: 43
End: 2018-08-10

## 2018-08-10 ASSESSMENT — PATIENT HEALTH QUESTIONNAIRE - PHQ9: SUM OF ALL RESPONSES TO PHQ QUESTIONS 1-9: 21

## 2018-08-10 NOTE — TELEPHONE ENCOUNTER
Prior Authorization Approval    Authorization Effective Date: 7/27/2018  Authorization Expiration Date: 8/10/2019  Medication: buPROPion (WELLBUTRIN XL) 150 MG 24 hr tablet  Approved Dose/Quantity:    Reference #: [id number]   Insurance Company: [employer] - Phone [phone] Fax [phone]  Expected CoPay:       CoPay Card Available:      Foundation Assistance Needed:    Which Pharmacy is filling the prescription (Not needed for infusion/clinic administered): [facility] [address] [place] [address] [address] [place] [address]  Pharmacy Notified: Yes  Patient Notified: Yes

## 2018-08-10 NOTE — TELEPHONE ENCOUNTER
Central Prior Authorization Team   Phone: [phone]      PA Initiation    Medication: buPROPion (WELLBUTRIN XL) 150 MG 24 hr tablet  Insurance Company: [employer] - Phone [phone] Fax [phone]  Pharmacy Filling the Rx: [facility] [address] [place] [address] [address]  Filling Pharmacy Phone: [phone]  Filling Pharmacy Fax:    Start Date: 8/10/2018

## 2018-08-10 NOTE — TELEPHONE ENCOUNTER
Prior Authorization Retail Medication Request    Medication/Dose: buPROPion (WELLBUTRIN XL) 150 MG 24 hr tablet  ICD code (if different than what is on RX):  **  Previously Tried and Failed:  **  Rationale:  **    Insurance Name:  **  Insurance ID:  **      Pharmacy Information (if different than what is on RX)  Name:    [facility] [id number]  [place], [place] - [address] [facility] [address]  [address] [facility]  [place] [address]  Phone: [phone] Fax: [phone]

## 2018-08-14 ENCOUNTER — OFFICE VISIT (OUTPATIENT)
Dept: ORTHOPEDICS | Facility: CLINIC | Age: 43
End: 2018-08-14
Attending: FAMILY MEDICINE
Payer: COMMERCIAL

## 2018-08-14 VITALS
HEIGHT: 60 IN | DIASTOLIC BLOOD PRESSURE: 72 MMHG | BODY MASS INDEX: 32.75 KG/M2 | WEIGHT: 166.8 LBS | SYSTOLIC BLOOD PRESSURE: 126 MMHG

## 2018-08-14 DIAGNOSIS — M53.3 SACROILIAC JOINT PAIN: ICD-10-CM

## 2018-08-14 DIAGNOSIS — G89.29 CHRONIC BILATERAL LOW BACK PAIN WITHOUT SCIATICA: Primary | ICD-10-CM

## 2018-08-14 DIAGNOSIS — M54.50 CHRONIC BILATERAL LOW BACK PAIN WITHOUT SCIATICA: Primary | ICD-10-CM

## 2018-08-14 PROCEDURE — 99243 OFF/OP CNSLTJ NEW/EST LOW 30: CPT | Performed by: PEDIATRICS

## 2018-08-14 ASSESSMENT — PAIN SCALES - GENERAL: PAINLEVEL: SEVERE PAIN (6)

## 2018-08-14 NOTE — MR AVS SNAPSHOT
After Visit Summary   8/14/2018    Cristobal Darnell    MRN: [id number]           Patient Information     Date Of Birth          1975        Visit Information        Provider Department      8/14/2018 10:20 AM Reilly Franks DO [facility] [place]        Today's Diagnoses     Chronic bilateral low back pain without sciatica    -  1    Sacroiliac joint pain          Care Instructions    We discussed ongoing back pain, which is consistent with sacroiliac joint source.  Plan physical therapy for this; the order is already in place.  We discussed potential for additional imaging of this area, such as x-ray or MRI; start with therapy, and if not improving, will reconsider imaging next time.  We also discussed options like chiropractic care, acupuncture, massage, and possibly injections for the low back pain.          Follow-ups after your visit        Follow-up notes from your care team     Return in about 6 weeks (around 9/25/2018), or if symptoms worsen or fail to improve.      Who to contact     If you have questions or need follow up information about today's clinic visit or your schedule please contact [facility] [place] directly at [phone].  Normal or non-critical lab and imaging results will be communicated to you by MyChart, letter or phone within 4 business days after the clinic has received the results. If you do not hear from us within 7 days, please contact the clinic through MyChart or phone. If you have a critical or abnormal lab result, we will notify you by phone as soon as possible.  Submit refill requests through [employer] or call your pharmacy and they will forward the refill request to us. Please allow 3 business days for your refill to be completed.          Additional Information About Your Visit        Care EveryWhere ID     This is your Care EveryWhere ID. This could be used by other organizations to access your [place]  medical records  [id number]        Your Vitals Were     Height Last Period BMI (Body Mass Index)             5' (1.524 m) 07/26/2018 (Approximate) 32.58 kg/m2          Blood Pressure from Last 3 Encounters:   08/14/18 126/72   08/09/18 128/70   03/27/18 122/78    Weight from Last 3 Encounters:   08/14/18 166 lb 12.8 oz (75.7 kg)   08/09/18 170 lb (77.1 kg)   03/27/18 177 lb 3.2 oz (80.4 kg)              Today, you had the following     No orders found for display       Primary Care Provider Office Phone # Fax #    Shania Yeung [phone]       [address] [facility]  [place] [address]        Equal Access to Services     [facility]: Hadii aad ku hadasho Soomaali, waaxda luqadaha, qaybta kaalmada adeegyada, fan wiley . So [facility] [phone].    ATENCIÓN: Si habla español, tiene a wall disposición servicios gratuitos de asistencia lingüística. Caroline al [phone].    We comply with applicable federal civil rights laws and [place] laws. We do not discriminate on the basis of race, color, national origin, age, disability, sex, sexual orientation, or gender identity.            Thank you!     Thank you for choosing [facility] [place]  for your care. Our goal is always to provide you with excellent care. Hearing back from our patients is one way we can continue to improve our services. Please take a few minutes to complete the written survey that you may receive in the mail after your visit with us. Thank you!             Your Updated Medication List - Protect others around you: Learn how to safely use, store and throw away your medicines at www.disposemymeds.org.          This list is accurate as of 8/14/18 11:12 AM.  Always use your most recent med list.                   Brand Name Dispense Instructions for use Diagnosis    buPROPion 150 MG 24 hr tablet    WELLBUTRIN XL    180 tablet    Take 2 tablets (300 mg) by mouth every morning     Major depressive disorder, single episode, moderate degree (H)       cholecalciferol 1000 UNIT tablet    vitamin D3    90 tablet    Take 1 tablet (1,000 Units) by mouth daily    Dietary deficiency       cyanocobalamin 1000 MCG tablet    vitamin  B-12    90 tablet    Take 1 tablet (1,000 mcg) by mouth daily    Vitamin B 12 deficiency       DAILY CHRISTIAN Tabs     90 tablet    Take 1 tablet by mouth daily    Dietary deficiency       fish oil-omega-3 fatty acids 1000 MG capsule     90 each    TAKE ONE CAPSULE BY MOUTH ONCE DAILY    HDL lipoprotein deficiency       naproxen 500 MG tablet    NAPROSYN    60 tablet    Take 1 tablet (500 mg) by mouth 2 times daily as needed for moderate pain    Chronic low back pain, unspecified back pain laterality, with sciatica presence unspecified, Chondromalacia of both patellae       order for DME     2 Device    Equipment being ordered: nighttime wrist splints - medium - no thumb    Bilateral carpal tunnel syndrome       sertraline 25 MG tablet    ZOLOFT    30 tablet    Take 1 tablet (25 mg) by mouth daily    Major depressive disorder, single episode, moderate degree (H)
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
## 2018-08-14 NOTE — NURSING NOTE
Initial /72  Ht 5' (1.524 m)  Wt 166 lb 12.8 oz (75.7 kg)  LMP 07/26/2018 (Approximate)  BMI 32.58 kg/m2 Estimated body mass index is 32.58 kg/(m^2) as calculated from the following:    Height as of this encounter: 5' (1.524 m).    Weight as of this encounter: 166 lb 12.8 oz (75.7 kg). .

## 2018-08-14 NOTE — PATIENT INSTRUCTIONS
We discussed ongoing back pain, which is consistent with sacroiliac joint source.  Plan physical therapy for this; the order is already in place.  We discussed potential for additional imaging of this area, such as x-ray or MRI; start with therapy, and if not improving, will reconsider imaging next time.  We also discussed options like chiropractic care, acupuncture, massage, and possibly injections for the low back pain.

## 2019-01-15 ENCOUNTER — TELEPHONE (OUTPATIENT)
Dept: FAMILY MEDICINE | Facility: CLINIC | Age: 44
End: 2019-01-15

## 2019-01-15 NOTE — LETTER
January 15, 2019      Cristobal Darnell  [address] [address]      Dear Cristobal,     Your clinic record indicates that you are due for an Depression update. We have a survey tool called a PHQ-9 (Patient Health Questionnaire) we use to measure how well your Depression is doing. Please complete the enclosed questionnaire and mail it back to us in the self-addressed stamped envelope.     If you have questions about this letter please contact your provider.     Sincerely,    Your [facility]

## 2019-01-15 NOTE — TELEPHONE ENCOUNTER
Panel Management Review      Patient has the following on her problem list:     Depression / Dysthymia review    Measure:  Needs PHQ-9 score of 4 or less during index window.  Administer PHQ-9 and if score is 5 or more, send encounter to provider for next steps.    5 - 7 month window range: yes    PHQ-9 SCORE 3/8/2018 3/27/2018 8/9/2018   PHQ-9 Total Score - - -   PHQ-9 Total Score 18 10 21       If PHQ-9 recheck is 5 or more, route to provider for next steps.    Patient is due for:  PHQ9      Composite cancer screening  Chart review shows that this patient is due/due soon for the following None  Summary:    Patient is due/failing the following:   PHQ9    Action needed:   PHQ9    Type of outreach:    Sent letter.    Questions for provider review:    None                                                                                                                                    Sandie ALVAREZ MA

## 2019-01-22 NOTE — PROGRESS NOTES
SUBJECTIVE:   Cristobal Darnell is a 43 year old female who presents to clinic today for the following health issues:    Chief Complaint   Patient presents with     Recheck Medication     Cts     Derm Problem     rash on tummy     Jaw Pain     Shoulder Pain     Patient notes bilateral carpal tunnel.  She has been wearing braces nightly for the past month.  Symptoms have improved, but remain.  Symptoms worsen as the day progresses and are worse with driving.  Patient is occasionally dropping objects.  She notes that she had an EMG last 20 years ago.    Patient notes continued intertrigo to her abdominal skin fold.  She uses nystatin powder and ointment without improvement.  She has an appointment tomorrow to discuss abdominoplasty to eliminate abdominal fold to improve symptoms.    Patient notes continued depression.  She denies suicidal ideation.  She has seen psychiatry but states all the meds they offered will cause drowsiness and she doesn't want to take them.  She continues with counseling.  She notes dizziness with continued wellbutrin use.  If she stops med, she feels worse.  She feels she is addicted to the medication.    Patient requests referral to [facility] for TMJ.  She has previously see the [facility] and did not find them helpful.  She has completed physical therapy as well.      Problem list and histories reviewed & adjusted, as indicated.  Additional history: as documented    Patient Active Problem List   Diagnosis     Vitamin B 12 deficiency     CARDIOVASCULAR SCREENING; LDL GOAL LESS THAN 160     Obesity     Major depressive disorder, single episode, moderate degree (H)     Chronic low back pain     CTS (carpal tunnel syndrome) - bilateral     Elevated blood uric acid level     Migraines     Pain in thoracic spine     GERD (gastroesophageal reflux disease)     Intertrigo     Female stress incontinence     TMJ (temporomandibular joint disorder)     Cervicalgia     Myofascial muscle  pain     Patellofemoral disorder, unspecified laterality     Past Surgical History:   Procedure Laterality Date     NO HISTORY OF SURGERY         Social History     Tobacco Use     Smoking status: Never Smoker     Smokeless tobacco: Never Used   Substance Use Topics     Alcohol use: No     Alcohol/week: 0.0 oz     Family History   Problem Relation Age of Onset     Diabetes Mother      Hypertension Mother      C.A.D. Mother 58        MI      Depression Mother      Cancer Other         cousin, unknown      Asthma Son          Current Outpatient Medications   Medication Sig Dispense Refill     buPROPion (WELLBUTRIN XL) 150 MG 24 hr tablet Take 1 tablet (150 mg) by mouth every morning for 7 days 7 tablet 0     cholecalciferol (VITAMIN D3) 1000 UNIT tablet Take 1 tablet (1,000 Units) by mouth daily 90 tablet 3     cyanocobalamin (VITAMIN  B-12) 1000 MCG tablet Take 1 tablet (1,000 mcg) by mouth daily 90 tablet 3     fish oil-omega-3 fatty acids 1000 MG capsule TAKE ONE CAPSULE BY MOUTH ONCE DAILY 90 each 3     Multiple Vitamin (DAILY CHRISTIAN) TABS Take 1 tablet by mouth daily 90 tablet 3     naproxen (NAPROSYN) 500 MG tablet Take 1 tablet (500 mg) by mouth 2 times daily as needed for moderate pain 60 tablet 11     order for DME Equipment being ordered: nighttime wrist splints - medium - no thumb 2 Device 0     sertraline (ZOLOFT) 25 MG tablet Take 0.5 tablets (12.5 mg) by mouth daily for 7 days, THEN 1 tablet (25 mg) daily. 30 tablet 0     Allergies   Allergen Reactions     Citalopram      Dizziness at 40 mg      Voltaren GI Disturbance     Zoloft      dizziness     BP Readings from Last 3 Encounters:   01/23/19 112/68   08/14/18 126/72   08/09/18 128/70    Wt Readings from Last 3 Encounters:   01/23/19 78 kg (172 lb)   08/14/18 75.7 kg (166 lb 12.8 oz)   08/09/18 77.1 kg (170 lb)                  Labs reviewed in EPIC    Reviewed and updated as needed this visit by clinical staff       Reviewed and updated as needed this  visit by Provider         ROS:  Constitutional, HEENT, cardiovascular, pulmonary, gi and gu systems are negative, except as otherwise noted.    OBJECTIVE:     /68   Pulse 79   Temp 97.6  F (36.4  C) (Oral)   Resp 20   Ht 1.524 m (5')   Wt 78 kg (172 lb)   LMP  (LMP Unknown)   SpO2 100%   Breastfeeding? No   BMI 33.59 kg/m    Body mass index is 33.59 kg/m .  GENERAL: healthy, alert and no distress  EYES: Eyes grossly normal to inspection, PERRL and conjunctivae and sclerae normal  HENT: normal cephalic/atraumatic, nose and mouth without ulcers or lesions, oropharynx clear, oral mucous membranes moist and palpable clicking noted with movement of right TMJ  NECK: no adenopathy, no asymmetry, masses, or scars and thyroid normal to palpation  RESP: lungs clear to auscultation - no rales, rhonchi or wheezes  CV: regular rate and rhythm, normal S1 S2, no S3 or S4, no murmur, click or rub, no peripheral edema and peripheral pulses strong  MS: 5/5  strength bilaterally.  Positive Tinel sign bilaterally.  SKIN: erythema with central clearing noted to abdominal fold  PSYCH: mentation appears normal, affect flat, judgement and insight intact and appearance well groomed    Diagnostic Test Results:  none     ASSESSMENT/PLAN:     1. Major depressive disorder, single episode, moderate degree (H)  Patient to wean off wellbutrin and retry sertraline at a low dose.  She never tried after last prescription.    - buPROPion (WELLBUTRIN XL) 150 MG 24 hr tablet; Take 1 tablet (150 mg) by mouth every morning for 7 days  Dispense: 7 tablet; Refill: 0  - sertraline (ZOLOFT) 25 MG tablet; Take 0.5 tablets (12.5 mg) by mouth daily for 7 days, THEN 1 tablet (25 mg) daily.  Dispense: 30 tablet; Refill: 0    2. Intertrigo  Patient to follow-up with plastic surgery.    3. Bilateral carpal tunnel syndrome  Patient declines EMG testing.  She wants to discuss with orthopedics to see if an injection would be an option.  - ORTHOPEDICS  ADULT REFERRAL    4. TMJ (temporomandibular joint disorder)    - OTOLARYNGOLOGY REFERRAL    FUTURE APPOINTMENTS:       - Follow-up visit in 2 weeks.    FRANCES Salas CNP  [facility]

## 2019-01-23 ENCOUNTER — OFFICE VISIT (OUTPATIENT)
Dept: FAMILY MEDICINE | Facility: CLINIC | Age: 44
End: 2019-01-23
Payer: COMMERCIAL

## 2019-01-23 VITALS
SYSTOLIC BLOOD PRESSURE: 112 MMHG | TEMPERATURE: 97.6 F | BODY MASS INDEX: 33.77 KG/M2 | HEIGHT: 60 IN | DIASTOLIC BLOOD PRESSURE: 68 MMHG | WEIGHT: 172 LBS | HEART RATE: 79 BPM | OXYGEN SATURATION: 100 % | RESPIRATION RATE: 20 BRPM

## 2019-01-23 DIAGNOSIS — G56.03 BILATERAL CARPAL TUNNEL SYNDROME: ICD-10-CM

## 2019-01-23 DIAGNOSIS — L30.4 INTERTRIGO: ICD-10-CM

## 2019-01-23 DIAGNOSIS — M26.609 TMJ (TEMPOROMANDIBULAR JOINT DISORDER): ICD-10-CM

## 2019-01-23 DIAGNOSIS — F32.1 MAJOR DEPRESSIVE DISORDER, SINGLE EPISODE, MODERATE DEGREE (H): Primary | ICD-10-CM

## 2019-01-23 PROCEDURE — 99214 OFFICE O/P EST MOD 30 MIN: CPT | Performed by: NURSE PRACTITIONER

## 2019-01-23 RX ORDER — BUPROPION HYDROCHLORIDE 150 MG/1
150 TABLET ORAL EVERY MORNING
Qty: 7 TABLET | Refills: 0 | Status: SHIPPED | OUTPATIENT
Start: 2019-01-23 | End: 2019-02-04

## 2019-01-23 RX ORDER — SERTRALINE HYDROCHLORIDE 25 MG/1
TABLET, FILM COATED ORAL
Qty: 30 TABLET | Refills: 0 | Status: SHIPPED | OUTPATIENT
Start: 2019-01-23 | End: 2019-02-04

## 2019-01-23 ASSESSMENT — ANXIETY QUESTIONNAIRES
7. FEELING AFRAID AS IF SOMETHING AWFUL MIGHT HAPPEN: MORE THAN HALF THE DAYS
IF YOU CHECKED OFF ANY PROBLEMS ON THIS QUESTIONNAIRE, HOW DIFFICULT HAVE THESE PROBLEMS MADE IT FOR YOU TO DO YOUR WORK, TAKE CARE OF THINGS AT HOME, OR GET ALONG WITH OTHER PEOPLE: SOMEWHAT DIFFICULT
GAD7 TOTAL SCORE: 15
1. FEELING NERVOUS, ANXIOUS, OR ON EDGE: MORE THAN HALF THE DAYS
5. BEING SO RESTLESS THAT IT IS HARD TO SIT STILL: MORE THAN HALF THE DAYS
6. BECOMING EASILY ANNOYED OR IRRITABLE: NEARLY EVERY DAY
3. WORRYING TOO MUCH ABOUT DIFFERENT THINGS: MORE THAN HALF THE DAYS
2. NOT BEING ABLE TO STOP OR CONTROL WORRYING: MORE THAN HALF THE DAYS

## 2019-01-23 ASSESSMENT — PATIENT HEALTH QUESTIONNAIRE - PHQ9
SUM OF ALL RESPONSES TO PHQ QUESTIONS 1-9: 19
5. POOR APPETITE OR OVEREATING: MORE THAN HALF THE DAYS

## 2019-01-23 ASSESSMENT — MIFFLIN-ST. JEOR: SCORE: 1351.69

## 2019-01-23 ASSESSMENT — PAIN SCALES - GENERAL: PAINLEVEL: NO PAIN (0)

## 2019-01-23 NOTE — PATIENT INSTRUCTIONS
[facility]    If you have any questions regarding to your visit please contact your care team:     Team Pink:   Clinic Hours Telephone Number   Internal Medicine:  Dr. Maru Zamudio NP 7am-7pm  Monday - Thursday   7am-5pm  Fridays  [phone]  (Appointment scheduling available 24/7)   Urgent Care - [place] [facility] - 11am-9pm Monday-Friday Saturday-Sunday- 9am-5pm   [place] - 5pm-9pm Monday-Friday Saturday-Sunday- 9am-5pm  [phone] - [place]  [phone] - [place]       What options do I have for a visit other than an office visit? We offer electronic visits (e-visits) and telephone visits, when medically appropriate.  Please check with your medical insurance to see if these types of visits are covered, as you will be responsible for any charges that are not paid by your insurance.      You can use [employer] (secure electronic communication) to access to your chart, send your primary care provider a message, or make an appointment. Ask a team member how to get started.     For a price quote for your services, please call our Consumer Price Line at [phone] or our Imaging Cost estimation line at [phone] (for imaging tests).  Corazon JONES CMA

## 2019-01-24 ENCOUNTER — TRANSFERRED RECORDS (OUTPATIENT)
Dept: HEALTH INFORMATION MANAGEMENT | Facility: CLINIC | Age: 44
End: 2019-01-24

## 2019-01-24 ASSESSMENT — ANXIETY QUESTIONNAIRES: GAD7 TOTAL SCORE: 15

## 2019-02-04 ENCOUNTER — OFFICE VISIT (OUTPATIENT)
Dept: FAMILY MEDICINE | Facility: CLINIC | Age: 44
End: 2019-02-04
Payer: COMMERCIAL

## 2019-02-04 VITALS
BODY MASS INDEX: 34.16 KG/M2 | TEMPERATURE: 96.9 F | HEART RATE: 68 BPM | RESPIRATION RATE: 14 BRPM | WEIGHT: 174 LBS | OXYGEN SATURATION: 98 % | DIASTOLIC BLOOD PRESSURE: 65 MMHG | SYSTOLIC BLOOD PRESSURE: 103 MMHG | HEIGHT: 60 IN

## 2019-02-04 DIAGNOSIS — G89.29 CHRONIC BILATERAL LOW BACK PAIN WITH BILATERAL SCIATICA: ICD-10-CM

## 2019-02-04 DIAGNOSIS — M54.41 CHRONIC BILATERAL LOW BACK PAIN WITH BILATERAL SCIATICA: ICD-10-CM

## 2019-02-04 DIAGNOSIS — E66.811 CLASS 1 OBESITY DUE TO EXCESS CALORIES WITHOUT SERIOUS COMORBIDITY WITH BODY MASS INDEX (BMI) OF 34.0 TO 34.9 IN ADULT: ICD-10-CM

## 2019-02-04 DIAGNOSIS — F32.1 MAJOR DEPRESSIVE DISORDER, SINGLE EPISODE, MODERATE DEGREE (H): Primary | ICD-10-CM

## 2019-02-04 DIAGNOSIS — M54.42 CHRONIC BILATERAL LOW BACK PAIN WITH BILATERAL SCIATICA: ICD-10-CM

## 2019-02-04 DIAGNOSIS — E66.09 CLASS 1 OBESITY DUE TO EXCESS CALORIES WITHOUT SERIOUS COMORBIDITY WITH BODY MASS INDEX (BMI) OF 34.0 TO 34.9 IN ADULT: ICD-10-CM

## 2019-02-04 PROBLEM — M22.2X9: Status: ACTIVE | Noted: 2018-08-09

## 2019-02-04 PROCEDURE — 99214 OFFICE O/P EST MOD 30 MIN: CPT | Performed by: FAMILY MEDICINE

## 2019-02-04 RX ORDER — SERTRALINE HYDROCHLORIDE 25 MG/1
25 TABLET, FILM COATED ORAL DAILY
Qty: 30 TABLET | Refills: 0 | Status: SHIPPED | OUTPATIENT
Start: 2019-02-04 | End: 2019-03-26

## 2019-02-04 ASSESSMENT — PATIENT HEALTH QUESTIONNAIRE - PHQ9: SUM OF ALL RESPONSES TO PHQ QUESTIONS 1-9: 14

## 2019-02-04 ASSESSMENT — MIFFLIN-ST. JEOR: SCORE: 1360.76
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
## 2019-02-11 ENCOUNTER — ANCILLARY PROCEDURE (OUTPATIENT)
Dept: MRI IMAGING | Facility: CLINIC | Age: 44
End: 2019-02-11
Attending: FAMILY MEDICINE
Payer: COMMERCIAL

## 2019-02-11 DIAGNOSIS — G89.29 CHRONIC BILATERAL LOW BACK PAIN WITH BILATERAL SCIATICA: ICD-10-CM

## 2019-02-11 DIAGNOSIS — M54.41 CHRONIC BILATERAL LOW BACK PAIN WITH BILATERAL SCIATICA: ICD-10-CM

## 2019-02-11 DIAGNOSIS — M54.42 CHRONIC BILATERAL LOW BACK PAIN WITH BILATERAL SCIATICA: ICD-10-CM

## 2019-02-11 PROCEDURE — 72148 MRI LUMBAR SPINE W/O DYE: CPT | Mod: TC

## 2019-02-11 NOTE — RESULT ENCOUNTER NOTE
Please call patient:    Your MRI confirms degenerative changes in your spine, including a bulging disc and narrowing called foraminal stenosis. Treatment options include physical therapy or an injection. In most cases, a back surgery would only be considered as a last resort. You can follow-up with your sports medicine specialist in [facility] at [phone].    Shania Yeung MD

## 2019-02-27 ENCOUNTER — THERAPY VISIT (OUTPATIENT)
Dept: PHYSICAL THERAPY | Facility: CLINIC | Age: 44
End: 2019-02-27
Payer: COMMERCIAL

## 2019-02-27 DIAGNOSIS — M54.42 ACUTE LEFT-SIDED LOW BACK PAIN WITH LEFT-SIDED SCIATICA: ICD-10-CM

## 2019-02-27 PROCEDURE — 97161 PT EVAL LOW COMPLEX 20 MIN: CPT | Mod: GP | Performed by: PHYSICAL THERAPIST

## 2019-02-27 PROCEDURE — 97110 THERAPEUTIC EXERCISES: CPT | Mod: GP | Performed by: PHYSICAL THERAPIST

## 2019-02-27 PROCEDURE — 97112 NEUROMUSCULAR REEDUCATION: CPT | Mod: GP | Performed by: PHYSICAL THERAPIST
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
## 2019-03-05 ENCOUNTER — ANCILLARY PROCEDURE (OUTPATIENT)
Dept: MAMMOGRAPHY | Facility: CLINIC | Age: 44
End: 2019-03-05
Payer: COMMERCIAL

## 2019-03-05 ENCOUNTER — OFFICE VISIT (OUTPATIENT)
Dept: NEUROSURGERY | Facility: CLINIC | Age: 44
End: 2019-03-05
Payer: COMMERCIAL

## 2019-03-05 VITALS
TEMPERATURE: 97.9 F | RESPIRATION RATE: 16 BRPM | HEART RATE: 76 BPM | WEIGHT: 173 LBS | DIASTOLIC BLOOD PRESSURE: 71 MMHG | HEIGHT: 60 IN | OXYGEN SATURATION: 97 % | BODY MASS INDEX: 33.96 KG/M2 | SYSTOLIC BLOOD PRESSURE: 107 MMHG

## 2019-03-05 DIAGNOSIS — Z12.31 VISIT FOR SCREENING MAMMOGRAM: ICD-10-CM

## 2019-03-05 DIAGNOSIS — M54.16 LUMBAR RADICULOPATHY: Primary | ICD-10-CM

## 2019-03-05 PROCEDURE — 77067 SCR MAMMO BI INCL CAD: CPT | Mod: TC

## 2019-03-05 PROCEDURE — 99203 OFFICE O/P NEW LOW 30 MIN: CPT | Performed by: NURSE PRACTITIONER

## 2019-03-05 ASSESSMENT — MIFFLIN-ST. JEOR: SCORE: 1356.22

## 2019-03-05 ASSESSMENT — PAIN SCALES - GENERAL: PAINLEVEL: SEVERE PAIN (7)
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
## 2019-03-06 ENCOUNTER — OFFICE VISIT (OUTPATIENT)
Dept: ORTHOPEDICS | Facility: CLINIC | Age: 44
End: 2019-03-06
Payer: COMMERCIAL

## 2019-03-06 ENCOUNTER — THERAPY VISIT (OUTPATIENT)
Dept: PHYSICAL THERAPY | Facility: CLINIC | Age: 44
End: 2019-03-06
Payer: COMMERCIAL

## 2019-03-06 VITALS
HEART RATE: 86 BPM | OXYGEN SATURATION: 99 % | SYSTOLIC BLOOD PRESSURE: 108 MMHG | WEIGHT: 173.2 LBS | RESPIRATION RATE: 16 BRPM | HEIGHT: 60 IN | DIASTOLIC BLOOD PRESSURE: 72 MMHG | BODY MASS INDEX: 34 KG/M2

## 2019-03-06 DIAGNOSIS — G56.03 BILATERAL CARPAL TUNNEL SYNDROME: Primary | ICD-10-CM

## 2019-03-06 DIAGNOSIS — M54.42 ACUTE LEFT-SIDED LOW BACK PAIN WITH LEFT-SIDED SCIATICA: ICD-10-CM

## 2019-03-06 PROCEDURE — 97112 NEUROMUSCULAR REEDUCATION: CPT | Mod: GP | Performed by: PHYSICAL THERAPIST

## 2019-03-06 PROCEDURE — 20526 THER INJECTION CARP TUNNEL: CPT | Mod: 50 | Performed by: ORTHOPAEDIC SURGERY

## 2019-03-06 PROCEDURE — 97110 THERAPEUTIC EXERCISES: CPT | Mod: GP | Performed by: PHYSICAL THERAPIST

## 2019-03-06 PROCEDURE — 99243 OFF/OP CNSLTJ NEW/EST LOW 30: CPT | Mod: 25 | Performed by: ORTHOPAEDIC SURGERY

## 2019-03-06 RX ORDER — TRIAMCINOLONE ACETONIDE 40 MG/ML
60 INJECTION, SUSPENSION INTRA-ARTICULAR; INTRAMUSCULAR
Status: DISCONTINUED | OUTPATIENT
Start: 2019-03-06 | End: 2020-01-08

## 2019-03-06 RX ORDER — LIDOCAINE HYDROCHLORIDE 10 MG/ML
1 INJECTION, SOLUTION INFILTRATION; PERINEURAL
Status: DISCONTINUED | OUTPATIENT
Start: 2019-03-06 | End: 2020-01-08

## 2019-03-06 RX ORDER — TRIAMCINOLONE ACETONIDE 40 MG/ML
40 INJECTION, SUSPENSION INTRA-ARTICULAR; INTRAMUSCULAR
Status: DISCONTINUED | OUTPATIENT
Start: 2019-03-06 | End: 2020-01-08

## 2019-03-06 RX ADMIN — TRIAMCINOLONE ACETONIDE 40 MG: 40 INJECTION, SUSPENSION INTRA-ARTICULAR; INTRAMUSCULAR at 10:46

## 2019-03-06 RX ADMIN — LIDOCAINE HYDROCHLORIDE 1 ML: 10 INJECTION, SOLUTION INFILTRATION; PERINEURAL at 10:46

## 2019-03-06 RX ADMIN — TRIAMCINOLONE ACETONIDE 60 MG: 40 INJECTION, SUSPENSION INTRA-ARTICULAR; INTRAMUSCULAR at 10:46

## 2019-03-06 ASSESSMENT — PAIN SCALES - GENERAL: PAINLEVEL: SEVERE PAIN (6)

## 2019-03-06 ASSESSMENT — MIFFLIN-ST. JEOR: SCORE: 1357.13

## 2019-03-06 NOTE — LETTER
3/6/2019         RE: Cristobal Darnell  [address] [id number]        Dear Colleague,    Thank you for referring your patient, Cristobal Darnell, to the [facility]. Please see a copy of my visit note below.    CHIEF COMPLAINT:   Chief Complaint   Patient presents with     Cts     Bilateral hand numbness/tingling/pain. Onset: 20 years. Patient notes numbness in all fingers. Pain starts in the palm of the hands and travels up the arms to the elbows. No EMG. No treatments. Symptoms increase with driving and hard work. She wears braces at night, helps.      Cristobal Darnell is seen today in the [facility] for evaluation of bilateral hand pain, numbness and tingling  at the request of FRANCES Salas CNP      HISTORY:  Cristobal Darnell is a 43 year old female , Right -hand dominant who is seen in for Bilateral hand numbness and tingling, pain and weakness x 20+ years.  The symptoms have been constant, and helped by a splint and NSAIDs.  Has not tried a injections.  she has numbness and tingling in all digits.  Other symptoms include pain up arm to the neck on the right and to the elbow on the left. Thinks started when she was first pregnant with her daughter 22 years ago. Has not had an EMG. Symptoms aggravated with driving, in the morning, or with activities using her hands. Hand and fingers feel swollen in the morning.    Suspected cause: Due to unknown factors.    Pain severity: 4/10-8/10  Pain quality: dull  Frequency of symptoms: are constant.  Aggravating Factors: using hands, driving, night/morning.  Relieving Factors: rest, brace, aleve.  Previous modalities tried: a splint and NSAIDs  Prior wrist injury/trauma: denies.    Usual level of work activity: none. Household activities.      Other PMH:  has a past medical history of Anxiety, Chondromalacia patella, Elevated uric acid (10/26/2011), GERD (gastroesophageal reflux disease), HDL lipoprotein deficiency,  Major depressive disorder, single episode, moderate degree (H), Microscopic hematuria (10/1/2012), Migraine headaches (5/3/2010), Migraines, Plantar fasciitis, TMJ (temporomandibular joint disorder), and Vitamin B 12 deficiency. She also has no past medical history of Amblyopia, Bleeding disorder (H), Cancer (H), Degenerative joint disease, Diabetic retinopathy (H), Glaucoma, Heart disease, Hypertension, Inflammatory arthritis, Kidney disease, Macular degeneration, Nonsenile cataract, Retinal detachment, Strabismus, Stroke (H), Surgical complications, Type II or unspecified type diabetes mellitus without mention of complication, not stated as uncontrolled, Unspecified asthma(493.90), or Uveitis.  Patient Active Problem List    Diagnosis Date Noted     Major depressive disorder, single episode, moderate degree (H)      Priority: High     Psychotherapy [place] counseling       Acute left-sided low back pain with left-sided sciatica 02/27/2019     Priority: Medium     Patellofemoral disorder, unspecified laterality 08/09/2018     Priority: Medium     Cervicalgia 02/16/2017     Priority: Medium     Myofascial muscle pain 02/16/2017     Priority: Medium     TMJ (temporomandibular joint disorder)      Priority: Medium     Female stress incontinence 09/14/2015     Priority: Medium     Intertrigo 07/02/2015     Priority: Medium     GERD (gastroesophageal reflux disease)      Priority: Medium     Pain in thoracic spine 10/10/2013     Priority: Medium     Migraines 05/22/2012     Priority: Medium     Chronic low back pain 10/25/2011     Priority: Medium     physical therapy, Dr. Bragg, sports medicine        CTS (carpal tunnel syndrome) - bilateral 10/25/2011     Priority: Medium     Failed trial of wrist splints, orthopedics referral        Obesity 05/16/2011     Priority: Medium     Elevated blood uric acid level 10/26/2011     Priority: Low     CARDIOVASCULAR SCREENING; LDL GOAL LESS THAN 160 10/31/2010     Priority: Low      Vitamin B 12 deficiency      Priority: Low       Surgical Hx:  has a past surgical history that includes no history of surgery.    Medications:   Current Outpatient Medications:      cholecalciferol (VITAMIN D3) 1000 UNIT tablet, Take 1 tablet (1,000 Units) by mouth daily, Disp: 90 tablet, Rfl: 3     cyanocobalamin (VITAMIN  B-12) 1000 MCG tablet, Take 1 tablet (1,000 mcg) by mouth daily, Disp: 90 tablet, Rfl: 3     fish oil-omega-3 fatty acids 1000 MG capsule, TAKE ONE CAPSULE BY MOUTH ONCE DAILY, Disp: 90 each, Rfl: 3     Multiple Vitamin (DAILY CHRISTIAN) TABS, Take 1 tablet by mouth daily, Disp: 90 tablet, Rfl: 3     naproxen (NAPROSYN) 500 MG tablet, Take 1 tablet (500 mg) by mouth 2 times daily as needed for moderate pain, Disp: 60 tablet, Rfl: 11     sertraline (ZOLOFT) 25 MG tablet, Take 1 tablet (25 mg) by mouth daily, Disp: 30 tablet, Rfl: 0    Allergies:   Allergies   Allergen Reactions     Citalopram      Dizziness at 40 mg      Voltaren GI Disturbance     Zoloft      dizziness       Social Hx:  reports that  has never smoked. she has never used smokeless tobacco. She reports that she does not drink alcohol or use drugs.    Family Hx: family history includes Asthma in her son; C.A.D. (age of onset: 58) in her mother; Cancer in an other family member; Depression in her mother; Diabetes in her mother; Hypertension in her mother.. Negative for bleeding/clotting disorders. Negative for adverse anesthesia reactions.    REVIEW OF SYSTEMS: 10 point ROS neg other than the symptoms noted above in the HPI and PMH. Notables include  CONSTITUTIONAL:NEGATIVE for fever, chills, change in weight  INTEGUMENTARY/SKIN: NEGATIVE for worrisome rashes, moles or lesions  MUSCULOSKELETAL:See HPI above  Neurology: see HPI above.      EXAM:  /72   Pulse 86   Resp 16   Ht 1.524 m (5')   Wt 78.6 kg (173 lb 3.2 oz)   SpO2 99%   BMI 33.83 kg/m     GENERAL APPEARANCE: healthy, alert and no distress   GAIT: NORMAL  SKIN: no  suspicious lesions or rashes  RESPIRATORY: No increased work of breathing.  NEURO:     strength: decreased,    thenar fasiculations: negative    Thenar atrophy: negative .    Sensation diminished in all digits,    reflexes normal in upper extremities.   PSYCH:  mentation appears normal and affect normal, not anxious.    MUSCULOSKELETAL:    RIGHT HAND/FINGERS:    Skin intact. No abnormal skin discoloration, erythema or ecchymosis.   No nail pitting or clubbing.  Normal wear pattern, color and tone.  No observable or palpable masses of the fingers or palm or wrist.  No palpable triggering of fingers.   No observable or palpable cords or nodules of the fingers or palm.    There is no swelling in the wrist, hand, forearm.  There is mild tenderness in the wrist.  There is no ecchymosis.  There is no erythema of the surrounding skin.  There is no maceration of the skin.  There is no deformity in the area.  Intact extensors. No extensor lag.    Special tests wrist:    Tinel's equivocal,    Phalen's equivocal.    Flexion/compression test equivocal.   Finkelstein's test: negative.   Ulnar piano sign: negative   Ulnar foveal tenderness:  negative    Special tests medial elbow ulnar nerve:    Tinel's negative,    Flexion/compression test negative.    Special tests median nerve proximal forearm:    Tinel's negative.    1st carpometacarpal grind: negative    Intact sensation light touch median, radial, ulnar nerves of the hand  Intact sensation to the radial and ulnar digital nerves of the fingers, as well as the finger tips.  Intact epl fpl fdp edc wrist flexion/extension biceps/triceps deltoid  Brisk capillary refill to all fingers.   Palpable radial pulse, 2+.      LEFT HAND/FINGERS:    Skin intact. No abnormal skin discoloration, erythema or ecchymosis.   No nail pitting or clubbing.  Normal wear pattern, color and tone.  No observable or palpable masses of the fingers or palm or wrist.  No palpable triggering of  fingers.   No observable or palpable cords or nodules of the fingers or palm.    There is no swelling in the wrist, hand, forearm.  There is mild tenderness in the wrist.  There is no ecchymosis.  There is no erythema of the surrounding skin.  There is no maceration of the skin.  There is no deformity in the area.  Intact extensors. No extensor lag.    Special tests wrist:    Tinel's equivocal,    Phalen's equivocal.    Flexion/compression test equivocal.   Finkelstein's test: negative.   Ulnar piano sign: negative   Ulnar foveal tenderness:  negative    Special tests medial elbow ulnar nerve:    Tinel's negative,    Flexion/compression test negative.    Special tests median nerve proximal forearm:    Tinel's negative.    1st carpometacarpal grind: negative    Intact sensation light touch median, radial, ulnar nerves of the hand  Intact sensation to the radial and ulnar digital nerves of the fingers, as well as the finger tips.  Intact epl fpl fdp edc wrist flexion/extension biceps/triceps deltoid  Brisk capillary refill to all fingers.   Palpable radial pulse, 2+.      XRAYS: none indicated.    SPECIAL STUDIES:  EMG: none.      ASSESSMENT/PLAN: 44yo RHD female with bilateral hand pain, numbness and tingling likely carpal tunnel syndrome.    * discussed with patient signs and symptoms consistent with carpal tunnel syndrome. Carpal tunnel syndrome is compression or pinching of the median nerve at the wrist, as it enters the hand. There are many different causes, and in most cases, multifactorial.    * An indepth discussion was had with her about the options for treatment, which included activity modification to avoid aggravating activities, taking breaks during activities that cause symptoms, stretching, NSAIDS to help decrease inflammation and swelling within the carpal tunnel, night splinting, corticosteroid injections, and carpal tunnel release.   * depending upon severity and duration of symptoms, nonoperative  treatment is usually initiated, starting with least invasive modalities such as activity modification and a trial of night splints and NSAIDs.  * Cortisone injections are considered to decrease swelling and inflammation within the carpal tunnel and compression of the nerve.   * Lastly, carpal tunnel release should symptoms persist despite trial of nonoperative treatment, or in cases of severe carpal tunnel syndrome.  * risks of surgery, including, but not limited to: bleeding, infection, pain, scar, damage to adjacent structures (nerves, vessels, tendons), temporary versus permanent nerve injury, failure to relieve symptoms, recurrence of symptoms, incomplete release, stiffness, scar sensitivity and tenderness, need for further surgery, risks of anesthesia were discussed.  * in some cases, with severe, prolonged symptoms, or in situations of underlying peripheral neuropathy, there may be permanent nerve changes not amenable to surgery, that even with surgery, may not resolve.  * in some cases, it may take 6 months to a year or longer for symptoms to improve or resolve, but even then may not completely resolve.    * at this time, she'd like to try injections. See procedure notes below for bilateral carpal tunnel syndrome injections.   * return to clinic as needed.      * all patient's questions addressed and answered today.      Diego Serna M.D., M.S.  Dept. of Orthopaedic Surgery  [facility]    Hand / Upper Extremity Injection/Arthrocentesis: bilateral carpal tunnel  Date/Time: 3/6/2019 10:46 AM  Performed by: West Bowers PA  Authorized by: Diego Serna MD     Indications:  Pain  Needle Size:  25 G  Guidance: landmark    Approach:  Volar  Condition: carpal tunnel    Laterality:  Bilateral  Site:  Bilateral carpal tunnel  Medications (Right):  1 mL lidocaine 1 %; 60 mg triamcinolone 40 MG/ML  Medications (Left):  1 mL lidocaine 1 %; 40 mg triamcinolone 40 MG/ML  Outcome:  Tolerated  well, no immediate complications  Procedure discussed: discussed risks, benefits, and alternatives    Consent Given by:  Patient  Prep: patient was prepped and draped in usual sterile fashion                Again, thank you for allowing me to participate in the care of your patient.        Sincerely,        Diego Serna MD

## 2019-03-06 NOTE — PROGRESS NOTES
CHIEF COMPLAINT:   Chief Complaint   Patient presents with     Cts     Bilateral hand numbness/tingling/pain. Onset: 20 years. Patient notes numbness in all fingers. Pain starts in the palm of the hands and travels up the arms to the elbows. No EMG. No treatments. Symptoms increase with driving and hard work. She wears braces at night, helps.      Cristobal Darnell is seen today in the [facility] for evaluation of bilateral hand pain, numbness and tingling  at the request of FRANCES Salas CNP      HISTORY:  Cristobal Darnell is a 43 year old female , Right -hand dominant who is seen in for Bilateral hand numbness and tingling, pain and weakness x 20+ years.  The symptoms have been constant, and helped by a splint and NSAIDs.  Has not tried a injections.  she has numbness and tingling in all digits.  Other symptoms include pain up arm to the neck on the right and to the elbow on the left. Thinks started when she was first pregnant with her daughter 22 years ago. Has not had an EMG. Symptoms aggravated with driving, in the morning, or with activities using her hands. Hand and fingers feel swollen in the morning.    Suspected cause: Due to unknown factors.    Pain severity: 4/10-8/10  Pain quality: dull  Frequency of symptoms: are constant.  Aggravating Factors: using hands, driving, night/morning.  Relieving Factors: rest, brace, aleve.  Previous modalities tried: a splint and NSAIDs  Prior wrist injury/trauma: denies.    Usual level of work activity: none. Household activities.      Other PMH:  has a past medical history of Anxiety, Chondromalacia patella, Elevated uric acid (10/26/2011), GERD (gastroesophageal reflux disease), HDL lipoprotein deficiency, Major depressive disorder, single episode, moderate degree (H), Microscopic hematuria (10/1/2012), Migraine headaches (5/3/2010), Migraines, Plantar fasciitis, TMJ (temporomandibular joint disorder), and Vitamin B 12 deficiency. She also  has no past medical history of Amblyopia, Bleeding disorder (H), Cancer (H), Degenerative joint disease, Diabetic retinopathy (H), Glaucoma, Heart disease, Hypertension, Inflammatory arthritis, Kidney disease, Macular degeneration, Nonsenile cataract, Retinal detachment, Strabismus, Stroke (H), Surgical complications, Type II or unspecified type diabetes mellitus without mention of complication, not stated as uncontrolled, Unspecified asthma(493.90), or Uveitis.  Patient Active Problem List    Diagnosis Date Noted     Major depressive disorder, single episode, moderate degree (H)      Priority: High     Psychotherapy [place] counseling       Acute left-sided low back pain with left-sided sciatica 02/27/2019     Priority: Medium     Patellofemoral disorder, unspecified laterality 08/09/2018     Priority: Medium     Cervicalgia 02/16/2017     Priority: Medium     Myofascial muscle pain 02/16/2017     Priority: Medium     TMJ (temporomandibular joint disorder)      Priority: Medium     Female stress incontinence 09/14/2015     Priority: Medium     Intertrigo 07/02/2015     Priority: Medium     GERD (gastroesophageal reflux disease)      Priority: Medium     Pain in thoracic spine 10/10/2013     Priority: Medium     Migraines 05/22/2012     Priority: Medium     Chronic low back pain 10/25/2011     Priority: Medium     physical therapy, Dr. Bragg, sports medicine        CTS (carpal tunnel syndrome) - bilateral 10/25/2011     Priority: Medium     Failed trial of wrist splints, orthopedics referral        Obesity 05/16/2011     Priority: Medium     Elevated blood uric acid level 10/26/2011     Priority: Low     CARDIOVASCULAR SCREENING; LDL GOAL LESS THAN 160 10/31/2010     Priority: Low     Vitamin B 12 deficiency      Priority: Low       Surgical Hx:  has a past surgical history that includes no history of surgery.    Medications:   Current Outpatient Medications:      cholecalciferol (VITAMIN D3) 1000 UNIT tablet,  Take 1 tablet (1,000 Units) by mouth daily, Disp: 90 tablet, Rfl: 3     cyanocobalamin (VITAMIN  B-12) 1000 MCG tablet, Take 1 tablet (1,000 mcg) by mouth daily, Disp: 90 tablet, Rfl: 3     fish oil-omega-3 fatty acids 1000 MG capsule, TAKE ONE CAPSULE BY MOUTH ONCE DAILY, Disp: 90 each, Rfl: 3     Multiple Vitamin (DAILY CHRISTIAN) TABS, Take 1 tablet by mouth daily, Disp: 90 tablet, Rfl: 3     naproxen (NAPROSYN) 500 MG tablet, Take 1 tablet (500 mg) by mouth 2 times daily as needed for moderate pain, Disp: 60 tablet, Rfl: 11     sertraline (ZOLOFT) 25 MG tablet, Take 1 tablet (25 mg) by mouth daily, Disp: 30 tablet, Rfl: 0    Allergies:   Allergies   Allergen Reactions     Citalopram      Dizziness at 40 mg      Voltaren GI Disturbance     Zoloft      dizziness       Social Hx:  reports that  has never smoked. she has never used smokeless tobacco. She reports that she does not drink alcohol or use drugs.    Family Hx: family history includes Asthma in her son; C.A.D. (age of onset: 58) in her mother; Cancer in an other family member; Depression in her mother; Diabetes in her mother; Hypertension in her mother.. Negative for bleeding/clotting disorders. Negative for adverse anesthesia reactions.    REVIEW OF SYSTEMS: 10 point ROS neg other than the symptoms noted above in the HPI and PMH. Notables include  CONSTITUTIONAL:NEGATIVE for fever, chills, change in weight  INTEGUMENTARY/SKIN: NEGATIVE for worrisome rashes, moles or lesions  MUSCULOSKELETAL:See HPI above  Neurology: see HPI above.      EXAM:  /72   Pulse 86   Resp 16   Ht 1.524 m (5')   Wt 78.6 kg (173 lb 3.2 oz)   SpO2 99%   BMI 33.83 kg/m    GENERAL APPEARANCE: healthy, alert and no distress   GAIT: NORMAL  SKIN: no suspicious lesions or rashes  RESPIRATORY: No increased work of breathing.  NEURO:     strength: decreased,    thenar fasiculations: negative    Thenar atrophy: negative .    Sensation diminished in all digits,    reflexes normal  in upper extremities.   PSYCH:  mentation appears normal and affect normal, not anxious.    MUSCULOSKELETAL:    RIGHT HAND/FINGERS:    Skin intact. No abnormal skin discoloration, erythema or ecchymosis.   No nail pitting or clubbing.  Normal wear pattern, color and tone.  No observable or palpable masses of the fingers or palm or wrist.  No palpable triggering of fingers.   No observable or palpable cords or nodules of the fingers or palm.    There is no swelling in the wrist, hand, forearm.  There is mild tenderness in the wrist.  There is no ecchymosis.  There is no erythema of the surrounding skin.  There is no maceration of the skin.  There is no deformity in the area.  Intact extensors. No extensor lag.    Special tests wrist:    Tinel's equivocal,    Phalen's equivocal.    Flexion/compression test equivocal.   Finkelstein's test: negative.   Ulnar piano sign: negative   Ulnar foveal tenderness:  negative    Special tests medial elbow ulnar nerve:    Tinel's negative,    Flexion/compression test negative.    Special tests median nerve proximal forearm:    Tinel's negative.    1st carpometacarpal grind: negative    Intact sensation light touch median, radial, ulnar nerves of the hand  Intact sensation to the radial and ulnar digital nerves of the fingers, as well as the finger tips.  Intact epl fpl fdp edc wrist flexion/extension biceps/triceps deltoid  Brisk capillary refill to all fingers.   Palpable radial pulse, 2+.      LEFT HAND/FINGERS:    Skin intact. No abnormal skin discoloration, erythema or ecchymosis.   No nail pitting or clubbing.  Normal wear pattern, color and tone.  No observable or palpable masses of the fingers or palm or wrist.  No palpable triggering of fingers.   No observable or palpable cords or nodules of the fingers or palm.    There is no swelling in the wrist, hand, forearm.  There is mild tenderness in the wrist.  There is no ecchymosis.  There is no erythema of the surrounding  skin.  There is no maceration of the skin.  There is no deformity in the area.  Intact extensors. No extensor lag.    Special tests wrist:    Tinel's equivocal,    Phalen's equivocal.    Flexion/compression test equivocal.   Finkelstein's test: negative.   Ulnar piano sign: negative   Ulnar foveal tenderness:  negative    Special tests medial elbow ulnar nerve:    Tinel's negative,    Flexion/compression test negative.    Special tests median nerve proximal forearm:    Tinel's negative.    1st carpometacarpal grind: negative    Intact sensation light touch median, radial, ulnar nerves of the hand  Intact sensation to the radial and ulnar digital nerves of the fingers, as well as the finger tips.  Intact epl fpl fdp edc wrist flexion/extension biceps/triceps deltoid  Brisk capillary refill to all fingers.   Palpable radial pulse, 2+.      XRAYS: none indicated.    SPECIAL STUDIES:  EMG: none.      ASSESSMENT/PLAN: 44yo RHD female with bilateral hand pain, numbness and tingling likely carpal tunnel syndrome.    * discussed with patient signs and symptoms consistent with carpal tunnel syndrome. Carpal tunnel syndrome is compression or pinching of the median nerve at the wrist, as it enters the hand. There are many different causes, and in most cases, multifactorial.    * An indepth discussion was had with her about the options for treatment, which included activity modification to avoid aggravating activities, taking breaks during activities that cause symptoms, stretching, NSAIDS to help decrease inflammation and swelling within the carpal tunnel, night splinting, corticosteroid injections, and carpal tunnel release.   * depending upon severity and duration of symptoms, nonoperative treatment is usually initiated, starting with least invasive modalities such as activity modification and a trial of night splints and NSAIDs.  * Cortisone injections are considered to decrease swelling and inflammation within the carpal  tunnel and compression of the nerve.   * Lastly, carpal tunnel release should symptoms persist despite trial of nonoperative treatment, or in cases of severe carpal tunnel syndrome.  * risks of surgery, including, but not limited to: bleeding, infection, pain, scar, damage to adjacent structures (nerves, vessels, tendons), temporary versus permanent nerve injury, failure to relieve symptoms, recurrence of symptoms, incomplete release, stiffness, scar sensitivity and tenderness, need for further surgery, risks of anesthesia were discussed.  * in some cases, with severe, prolonged symptoms, or in situations of underlying peripheral neuropathy, there may be permanent nerve changes not amenable to surgery, that even with surgery, may not resolve.  * in some cases, it may take 6 months to a year or longer for symptoms to improve or resolve, but even then may not completely resolve.    * at this time, she'd like to try injections. See procedure notes below for bilateral carpal tunnel syndrome injections.   * return to clinic as needed.      * all patient's questions addressed and answered today.      Diego Serna M.D., M.S.  Dept. of Orthopaedic Surgery  [facility]    Hand / Upper Extremity Injection/Arthrocentesis: bilateral carpal tunnel  Date/Time: 3/6/2019 10:46 AM  Performed by: West Bowers PA  Authorized by: Diego Serna MD     Indications:  Pain  Needle Size:  25 G  Guidance: landmark    Approach:  Volar  Condition: carpal tunnel    Laterality:  Bilateral  Site:  Bilateral carpal tunnel  Medications (Right):  1 mL lidocaine 1 %; 60 mg triamcinolone 40 MG/ML  Medications (Left):  1 mL lidocaine 1 %; 40 mg triamcinolone 40 MG/ML  Outcome:  Tolerated well, no immediate complications  Procedure discussed: discussed risks, benefits, and alternatives    Consent Given by:  Patient  Prep: patient was prepped and draped in usual sterile fashion

## 2019-03-26 DIAGNOSIS — F32.1 MAJOR DEPRESSIVE DISORDER, SINGLE EPISODE, MODERATE DEGREE (H): ICD-10-CM

## 2019-03-27 RX ORDER — SERTRALINE HYDROCHLORIDE 25 MG/1
TABLET, FILM COATED ORAL
Qty: 30 TABLET | Refills: 0 | Status: SHIPPED | OUTPATIENT
Start: 2019-03-27 | End: 2019-05-14

## 2019-03-27 NOTE — TELEPHONE ENCOUNTER
"Routing refill request to provider for review/approval because:  Labs out of range:  PHQ-9, score of 14    Requested Prescriptions   Pending Prescriptions Disp Refills     sertraline (ZOLOFT) 25 MG tablet [Pharmacy Med Name: SERTRALINE 25MG TABLETS] 30 tablet 0     Sig: TAKE ONE TABLET BY MOUTH DAILY    SSRIs Protocol Failed - 3/26/2019 11:19 AM       Failed - PHQ-9 score less than 5 in past 6 months    Please review last PHQ-9 score.          Passed - Medication is active on med list       Passed - Patient is age 18 or older       Passed - No active pregnancy on record       Passed - No positive pregnancy test in last 12 months       Passed - Recent (6 mo) or future (30 days) visit within the authorizing provider's specialty    Patient had office visit in the last 6 months or has a visit in the next 30 days with authorizing provider or within the authorizing provider's specialty.  See \"Patient Info\" tab in inbasket, or \"Choose Columns\" in Meds & Orders section of the refill encounter.            Edilma Diaz RN  "

## 2019-05-14 DIAGNOSIS — E53.8 VITAMIN B 12 DEFICIENCY: ICD-10-CM

## 2019-05-14 DIAGNOSIS — F32.1 MAJOR DEPRESSIVE DISORDER, SINGLE EPISODE, MODERATE DEGREE (H): ICD-10-CM

## 2019-05-15 RX ORDER — LANOLIN ALCOHOL/MO/W.PET/CERES
CREAM (GRAM) TOPICAL
Qty: 90 TABLET | Refills: 0 | OUTPATIENT
Start: 2019-05-15

## 2019-05-15 NOTE — TELEPHONE ENCOUNTER
Duplicate 1st request.    cyanocobalamin (VITAMIN  B-12) 1000 MCG tablet 90 tablet 3 8/9/2018  No   Sig - Route: Take 1 tablet (1,000 mcg) by mouth daily - Oral   Sent to pharmacy as: cyanocobalamin (VITAMIN  B-12) 1000 MCG tablet   Class: E-Prescribe   Order: [id number]   E-Prescribing Status: Receipt confirmed by pharmacy (8/9/2018 11:01 AM CDT)     Melita FERNANDEZ CMA ([facility])

## 2019-05-15 NOTE — TELEPHONE ENCOUNTER
"Routing refill request to provider for review/approval because:  PHQ-9 score  PHQ-9 SCORE 8/9/2018 1/23/2019 2/4/2019   PHQ-9 Total Score - - -   PHQ-9 Total Score 21 19 14     Requested Prescriptions   Pending Prescriptions Disp Refills     sertraline (ZOLOFT) 25 MG tablet [Pharmacy Med Name: SERTRALINE 25MG TABLETS] 30 tablet 0     Sig: TAKE ONE TABLET BY MOUTH DAILY       SSRIs Protocol Failed - 5/15/2019 10:14 AM        Failed - PHQ-9 score less than 5 in past 6 months     Please review last PHQ-9 score.           Passed - Medication is active on med list        Passed - Patient is age 18 or older        Passed - No active pregnancy on record        Passed - No positive pregnancy test in last 12 months        Passed - Recent (6 mo) or future (30 days) visit within the authorizing provider's specialty     Patient had office visit in the last 6 months or has a visit in the next 30 days with authorizing provider or within the authorizing provider's specialty.  See \"Patient Info\" tab in inbasket, or \"Choose Columns\" in Meds & Orders section of the refill encounter.           Disp Refills     Sunita Nick RN - [facility]      "

## 2019-05-16 RX ORDER — SERTRALINE HYDROCHLORIDE 25 MG/1
TABLET, FILM COATED ORAL
Qty: 30 TABLET | Refills: 0 | Status: SHIPPED | OUTPATIENT
Start: 2019-05-16 | End: 2019-05-29

## 2019-05-29 ENCOUNTER — OFFICE VISIT (OUTPATIENT)
Dept: FAMILY MEDICINE | Facility: CLINIC | Age: 44
End: 2019-05-29
Payer: COMMERCIAL

## 2019-05-29 VITALS
DIASTOLIC BLOOD PRESSURE: 72 MMHG | HEIGHT: 60 IN | TEMPERATURE: 98 F | BODY MASS INDEX: 31.92 KG/M2 | OXYGEN SATURATION: 99 % | RESPIRATION RATE: 20 BRPM | HEART RATE: 66 BPM | WEIGHT: 162.6 LBS | SYSTOLIC BLOOD PRESSURE: 116 MMHG

## 2019-05-29 DIAGNOSIS — F32.1 MAJOR DEPRESSIVE DISORDER, SINGLE EPISODE, MODERATE DEGREE (H): ICD-10-CM

## 2019-05-29 DIAGNOSIS — M22.41 CHONDROMALACIA OF BOTH PATELLAE: ICD-10-CM

## 2019-05-29 DIAGNOSIS — M54.5 CHRONIC LOW BACK PAIN, UNSPECIFIED BACK PAIN LATERALITY, WITH SCIATICA PRESENCE UNSPECIFIED: ICD-10-CM

## 2019-05-29 DIAGNOSIS — G89.29 CHRONIC LOW BACK PAIN, UNSPECIFIED BACK PAIN LATERALITY, WITH SCIATICA PRESENCE UNSPECIFIED: ICD-10-CM

## 2019-05-29 DIAGNOSIS — M22.42 CHONDROMALACIA OF BOTH PATELLAE: ICD-10-CM

## 2019-05-29 PROCEDURE — 99214 OFFICE O/P EST MOD 30 MIN: CPT | Performed by: PHYSICIAN ASSISTANT

## 2019-05-29 RX ORDER — SENNOSIDES 8.6 MG
650 CAPSULE ORAL EVERY 8 HOURS PRN
Qty: 60 TABLET | Refills: 1 | Status: SHIPPED | OUTPATIENT
Start: 2019-05-29 | End: 2019-09-28

## 2019-05-29 RX ORDER — NAPROXEN 500 MG/1
500 TABLET ORAL 2 TIMES DAILY PRN
Qty: 60 TABLET | Refills: 11 | Status: SHIPPED | OUTPATIENT
Start: 2019-05-29

## 2019-05-29 ASSESSMENT — PATIENT HEALTH QUESTIONNAIRE - PHQ9: SUM OF ALL RESPONSES TO PHQ QUESTIONS 1-9: 15

## 2019-05-29 ASSESSMENT — MIFFLIN-ST. JEOR: SCORE: 1305.92

## 2019-05-29 NOTE — PROGRESS NOTES
"Subjective     Cristobal Darnell is a 44 year old female who presents to clinic today for the following health issues:    HPI   Depression Followup    How are you doing with your depression since your last visit? No change    Are you having other symptoms that might be associated with depression? No    Have you had a significant life event?  No     Are you feeling anxious or having panic attacks?   No    Do you have any concerns with your use of alcohol or other drugs? No    Social History     Tobacco Use     Smoking status: Never Smoker     Smokeless tobacco: Never Used   Substance Use Topics     Alcohol use: No     Alcohol/week: 0.0 oz     Drug use: No     PHQ 1/23/2019 2/4/2019 5/29/2019   PHQ-9 Total Score 19 14 15   Q9: Thoughts of better off dead/self-harm past 2 weeks Several days Not at all Not at all     ALPESH-7 SCORE 2/14/2017 9/25/2017 1/23/2019   Total Score - - -   Total Score 14 18 15         Suicide Assessment Five-step Evaluation and Treatment (SAFE-T)    Amount of exercise or physical activity: None    Problems taking medications regularly: Yes,  problems remembering to take    Medication side effects: feeling lightheaded and dizziness from taking Zoloft    Diet: regular (no restrictions)    Reviewed PHQ  Reviewed current medication management.  Patient sees an outside [facility] [place]. She is amenable to a dose increase at this time. She does note that she has many situational stressors.  She was unsure if she needed another referral to change to our site for Physical Therapy.      Review of Systems   ROS COMP: Constitutional, HEENT, cardiovascular, pulmonary, gi and gu systems are negative, except as otherwise noted.      Objective    /72   Pulse 66   Temp 98  F (36.7  C) (Oral)   Resp 20   Ht 1.527 m (5' 0.12\")   Wt 73.8 kg (162 lb 9.6 oz)   SpO2 99%   BMI 31.63 kg/m    Body mass index is 31.63 kg/m .  Physical Exam     Total visit time is 20 Minutes with > 18 Minutes spent in care and consultation " "regarding Depression management and polyarthralgia with medication management and follow up plan.              Assessment & Plan     1. Chronic low back pain, unspecified back pain laterality, with sciatica presence unspecified  - naproxen (NAPROSYN) 500 MG tablet; Take 1 tablet (500 mg) by mouth 2 times daily as needed for moderate pain  Dispense: 60 tablet; Refill: 11  - acetaminophen (TYLENOL) 650 MG CR tablet; Take 1 tablet (650 mg) by mouth every 8 hours as needed for pain  Dispense: 60 tablet; Refill: 1    2. Chondromalacia of both patellae  - naproxen (NAPROSYN) 500 MG tablet; Take 1 tablet (500 mg) by mouth 2 times daily as needed for moderate pain  Dispense: 60 tablet; Refill: 11  - acetaminophen (TYLENOL) 650 MG CR tablet; Take 1 tablet (650 mg) by mouth every 8 hours as needed for pain  Dispense: 60 tablet; Refill: 1    3. Major depressive disorder, single episode, moderate degree (H)  - sertraline (ZOLOFT) 50 MG tablet; Take 1 tablet (50 mg) by mouth daily  Dispense: 30 tablet; Refill: 1     BMI:   Estimated body mass index is 31.63 kg/m  as calculated from the following:    Height as of this encounter: 1.527 m (5' 0.12\").    Weight as of this encounter: 73.8 kg (162 lb 9.6 oz).     Use medication as directed.  Follow up per Physical Therapy  Follow up if symptoms should persist, change or worsen.  Follow up in 2-4 weeks for depression follow up   Patient amenable to this follow up plan.           No follow-ups on file.    Monica Rios PA-C  [facility]      "

## 2019-08-07 PROBLEM — M54.42 ACUTE LEFT-SIDED LOW BACK PAIN WITH LEFT-SIDED SCIATICA: Status: RESOLVED | Noted: 2019-02-27 | Resolved: 2019-08-07
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
## 2019-08-13 ENCOUNTER — NURSE TRIAGE (OUTPATIENT)
Dept: FAMILY MEDICINE | Facility: CLINIC | Age: 44
End: 2019-08-13

## 2019-08-13 NOTE — TELEPHONE ENCOUNTER
Reason for call:  Patient reporting a symptom    Symptom or request:  Poking in chest area x 2 weeks     Duration (how long have symptoms been present):  2 weeks    Have you been treated for this before? No    Additional comments:  Na     Phone Number patient can be reached at:  Home number on file [phone] (home)    Best Time:  Any     Can we leave a detailed message on this number:  NO    Call taken on 8/13/2019 at 12:36 PM by Gaby Maldonado

## 2019-08-13 NOTE — TELEPHONE ENCOUNTER
Patient scheduled with provider tomorrow.   Additional Information    Negative: Severe difficulty breathing (e.g., struggling for each breath, speaks in single words)    Negative: Passed out (i.e., fainted, collapsed and was not responding)    Negative: Chest pain lasting longer than 5 minutes and ANY of the following:* Over 50 years old* Over 30 years old and at least one cardiac risk factor (i.e., high blood pressure, diabetes, high cholesterol, obesity, smoker or strong family history of heart disease)* Pain is crushing, pressure-like, or heavy * Took nitroglycerin and chest pain was not relieved* History of heart disease (i.e., angina, heart attack, bypass surgery, angioplasty, CHF)    Negative: Visible sweat on face or sweat dripping down face    Negative: Sounds like a life-threatening emergency to the triager    Negative: Followed an injury to chest    Negative: Cocaine use within last 3 days    Negative: Difficulty breathing    Negative: Pain also present in shoulder(s) or arm(s) or jaw    Negative: SEVERE chest pain    Negative: History of prior 'blood clot' in leg or lungs (i.e., deep vein thrombosis, pulmonary embolism)    Negative: Recent illness requiring prolonged bed rest (i.e., immobilization)    Negative: Hip or leg fracture in past 2 months (e.g, or had cast on leg or ankle)    Negative: Major surgery in the past month    Negative: Recent long-distance travel with prolonged time in car, bus, plane, or train (i.e., within past 2 weeks; 6 or more hours duration)    Negative: Heart beating irregularly or very rapidly    Chest pain lasting longer than 5 minutes    Negative: Intermittent chest pain and pain has been increasing in severity or frequency    Negative: Dizziness or lightheadedness    Negative: Coughing up blood    Negative: Patient sounds very sick or weak to the triager    Patient wants to be seen    Intermittent chest pains persist > 3 days    Negative: Fever > 100.5 F (38.1 C)     "Negative: All other patients with chest pain    Answer Assessment - Initial Assessment Questions  1. LOCATION: \"Where does it hurt?\"        Center of the chest between breasts  2. RADIATION: \"Does the pain go anywhere else?\" (e.g., into neck, jaw, arms, back)      Has neck pain but this is not new- had for years  3. ONSET: \"When did the chest pain begin?\" (Minutes, hours or days)       10 days ago- she had \"a difficult time at home\" but did not elaborate   4. PATTERN \"Does the pain come and go, or has it been constant since it started?\"  \"Does it get worse with exertion?\"       Constant, not worse with exertion.   5. DURATION: \"How long does it last\" (e.g., seconds, minutes, hours)      Constant, \"feels like a poke\"  6. SEVERITY: \"How bad is the pain?\"  (e.g., Scale 1-10; mild, moderate, or severe)     - MILD (1-3): doesn't interfere with normal activities      - MODERATE (4-7): interferes with normal activities or awakens from sleep     - SEVERE (8-10): excruciating pain, unable to do any normal activities        mild    10. OTHER SYMPTOMS: \"Do you have any other symptoms?\" (e.g., dizziness, nausea, vomiting, sweating, fever, difficulty breathing, cough)        No    Protocols used: CHEST PAIN-A-OH    "

## 2019-08-14 ENCOUNTER — ANCILLARY PROCEDURE (OUTPATIENT)
Dept: GENERAL RADIOLOGY | Facility: CLINIC | Age: 44
End: 2019-08-14
Attending: FAMILY MEDICINE
Payer: COMMERCIAL

## 2019-08-14 ENCOUNTER — OFFICE VISIT (OUTPATIENT)
Dept: FAMILY MEDICINE | Facility: CLINIC | Age: 44
End: 2019-08-14
Payer: COMMERCIAL

## 2019-08-14 VITALS
OXYGEN SATURATION: 100 % | SYSTOLIC BLOOD PRESSURE: 100 MMHG | HEIGHT: 60 IN | WEIGHT: 167.4 LBS | BODY MASS INDEX: 32.86 KG/M2 | TEMPERATURE: 97.5 F | RESPIRATION RATE: 20 BRPM | HEART RATE: 75 BPM | DIASTOLIC BLOOD PRESSURE: 60 MMHG

## 2019-08-14 DIAGNOSIS — R93.1 EQUIVOCAL STRESS ECHOCARDIOGRAM: ICD-10-CM

## 2019-08-14 DIAGNOSIS — N64.4 BREAST PAIN: ICD-10-CM

## 2019-08-14 DIAGNOSIS — Z01.818 PREOP GENERAL PHYSICAL EXAM: Primary | ICD-10-CM

## 2019-08-14 DIAGNOSIS — R07.89 ATYPICAL CHEST PAIN: ICD-10-CM

## 2019-08-14 DIAGNOSIS — F32.1 MAJOR DEPRESSIVE DISORDER, SINGLE EPISODE, MODERATE DEGREE (H): ICD-10-CM

## 2019-08-14 LAB
BASOPHILS # BLD AUTO: 0 10E9/L (ref 0–0.2)
BASOPHILS NFR BLD AUTO: 0.4 %
DIFFERENTIAL METHOD BLD: NORMAL
EOSINOPHIL # BLD AUTO: 0.1 10E9/L (ref 0–0.7)
EOSINOPHIL NFR BLD AUTO: 1.3 %
ERYTHROCYTE [DISTWIDTH] IN BLOOD BY AUTOMATED COUNT: 13.5 % (ref 10–15)
HCT VFR BLD AUTO: 36.1 % (ref 35–47)
HGB BLD-MCNC: 12.2 G/DL (ref 11.7–15.7)
LYMPHOCYTES # BLD AUTO: 2.7 10E9/L (ref 0.8–5.3)
LYMPHOCYTES NFR BLD AUTO: 29.5 %
MCH RBC QN AUTO: 27.2 PG (ref 26.5–33)
MCHC RBC AUTO-ENTMCNC: 33.8 G/DL (ref 31.5–36.5)
MCV RBC AUTO: 80 FL (ref 78–100)
MONOCYTES # BLD AUTO: 0.7 10E9/L (ref 0–1.3)
MONOCYTES NFR BLD AUTO: 7.6 %
NEUTROPHILS # BLD AUTO: 5.5 10E9/L (ref 1.6–8.3)
NEUTROPHILS NFR BLD AUTO: 61.2 %
PLATELET # BLD AUTO: 232 10E9/L (ref 150–450)
RBC # BLD AUTO: 4.49 10E12/L (ref 3.8–5.2)
WBC # BLD AUTO: 9.1 10E9/L (ref 4–11)

## 2019-08-14 PROCEDURE — 85025 COMPLETE CBC W/AUTO DIFF WBC: CPT | Performed by: FAMILY MEDICINE

## 2019-08-14 PROCEDURE — 93000 ELECTROCARDIOGRAM COMPLETE: CPT | Performed by: FAMILY MEDICINE

## 2019-08-14 PROCEDURE — 71046 X-RAY EXAM CHEST 2 VIEWS: CPT

## 2019-08-14 PROCEDURE — 99214 OFFICE O/P EST MOD 30 MIN: CPT | Performed by: FAMILY MEDICINE

## 2019-08-14 PROCEDURE — 36415 COLL VENOUS BLD VENIPUNCTURE: CPT | Performed by: FAMILY MEDICINE

## 2019-08-14 RX ORDER — TOPIRAMATE 25 MG/1
25 TABLET, FILM COATED ORAL 2 TIMES DAILY
COMMUNITY
Start: 2019-08-14 | End: 2019-12-09 | Stop reason: ALTCHOICE

## 2019-08-14 ASSESSMENT — PAIN SCALES - GENERAL: PAINLEVEL: MODERATE PAIN (4)

## 2019-08-14 ASSESSMENT — MIFFLIN-ST. JEOR: SCORE: 1327.73

## 2019-08-14 ASSESSMENT — PATIENT HEALTH QUESTIONNAIRE - PHQ9: SUM OF ALL RESPONSES TO PHQ QUESTIONS 1-9: 9

## 2019-08-14 NOTE — PROGRESS NOTES
"Subjective     Cristobal Darnell is a 44 year old female who initially presents to clinic today for chest pain, then requests preoperative history and physical as well:     HPI   Chest Pain      Onset: 2 weeks ago - waxing and waning since then     Description (location/character/radiation/duration): left side of chest/dulled pain    Intensity:  4/10    Accompanying signs and symptoms:        Shortness of breath: no        Sweating: no        Nausea/vomitting: no        Palpitations: YES-sometimes       Other (fevers/chills/cough/heartburn/lightheadedness): YES- dizziness and fatigue    History (similar episodes/previous evaluation): in past when doing exercise    Precipitating or alleviating factors:       Worse with exertion: YES       Worse with breathing: YES       Related to eating: no        Better with burping: no     Therapies tried and outcome: using oil to massage the chest which helps               Reviewed and updated as needed this visit by Provider  Tobacco  Allergies  Meds  Problems  Med Hx  Surg Hx  Fam Hx  Soc Hx          Review of Systems   ROS COMP: CONSTITUTIONAL: NEGATIVE for fever, chills, change in weight  INTEGUMENTARY/SKIN: NEGATIVE for worrisome rashes, moles or lesions  EYES: NEGATIVE for vision changes or irritation  ENT/MOUTH: NEGATIVE for ear, mouth and throat problems  RESP:as above  BREAST: breast mass/symptom   CV: POSITIVE for chest pain/chest pressure  GI: NEGATIVE for nausea, abdominal pain, heartburn, or change in bowel habits  : NEGATIVE for frequency, dysuria, or hematuria  MUSCULOSKELETAL: NEGATIVE for significant arthralgias or myalgia  NEURO: NEGATIVE for weakness, dizziness or paresthesias  ENDOCRINE: NEGATIVE for temperature intolerance, skin/hair changes  HEME: NEGATIVE for bleeding problems  PSYCHIATRIC: family stressors, Hx depression       Objective    /60   Pulse 75   Temp 97.5  F (36.4  C) (Oral)   Resp 20   Ht 1.527 m (5' 0.12\")   Wt 75.9 kg (167 lb " 6.4 oz)   SpO2 100%   BMI 32.56 kg/m    Body mass index is 32.56 kg/m .  Physical Exam   GENERAL: alert, no distress and obese  EYES: Eyes grossly normal to inspection, PERRL and conjunctivae and sclerae normal  HENT: ear canals and TM's normal, nose and mouth without ulcers or lesions  NECK: no adenopathy, no asymmetry, masses, or scars and thyroid normal to palpation  RESP: lungs clear to auscultation - no rales, rhonchi or wheezes  CV: regular rate and rhythm, normal S1 S2, no S3 or S4, no murmur, click or rub, no peripheral edema   ABDOMEN: soft, nontender, no hepatosplenomegaly, no masses and bowel sounds normal  MS: no gross musculoskeletal defects noted, no edema  PSYCH: mentation appears normal, affect normal/bright    Diagnostic Test Results:  Labs reviewed in Epic  Results for orders placed or performed in visit on 08/14/19   CBC with platelets differential   Result Value Ref Range    WBC 9.1 4.0 - 11.0 10e9/L    RBC Count 4.49 3.8 - 5.2 10e12/L    Hemoglobin 12.2 11.7 - 15.7 g/dL    Hematocrit 36.1 35.0 - 47.0 %    MCV 80 78 - 100 fl    MCH 27.2 26.5 - 33.0 pg    MCHC 33.8 31.5 - 36.5 g/dL    RDW 13.5 10.0 - 15.0 %    Platelet Count 232 150 - 450 10e9/L    % Neutrophils 61.2 %    % Lymphocytes 29.5 %    % Monocytes 7.6 %    % Eosinophils 1.3 %    % Basophils 0.4 %    Absolute Neutrophil 5.5 1.6 - 8.3 10e9/L    Absolute Lymphocytes 2.7 0.8 - 5.3 10e9/L    Absolute Monocytes 0.7 0.0 - 1.3 10e9/L    Absolute Eosinophils 0.1 0.0 - 0.7 10e9/L    Absolute Basophils 0.0 0.0 - 0.2 10e9/L    Diff Method Automated Method            Assessment & Plan   (R07.89) Atypical chest pain  Comment: patient is low risk for CAD, PE, or HF, and has no signs of fluid overload or DVT. Her symptoms are most consistent with chest wall pain, but anxiety could be considered   Plan: EKG 12-lead complete w/read - Clinics, XR Chest        2 Views, CBC with platelets differential,         Echocardiogram Exercise Stress        Naprosyn  "BID as needed - hold for 3 days prior to surgery. Follow-up in 2 weeks if symptoms persist to consider trial of PPI or further imaging     (F32.1) Major depressive disorder, single episode, moderate degree   Plan: continue sertraline 50 mg daily      BMI:   Estimated body mass index is 32.56 kg/m  as calculated from the following:    Height as of this encounter: 1.527 m (5' 0.12\").    Weight as of this encounter: 75.9 kg (167 lb 6.4 oz).   Weight management plan: Discussed healthy diet and exercise guidelines            Return in about 2 weeks (around 8/28/2019) for chest pain.    Shania Yeung MD  [facility]      "

## 2019-08-14 NOTE — Clinical Note
[facility] [phone] with labs, EKG, and xray reports if done.Shania Yeung MD

## 2019-08-14 NOTE — PROGRESS NOTES
[facility]  [address] [place] [id number]  [phone]  Dept: [phone]    PRE-OP EVALUATION:  Today's date: 2019    Cristobal Darnell (: 1975) presents for pre-operative evaluation assessment as requested by Dr. Violet Del Real.  She requires evaluation and anesthesia risk assessment prior to undergoing surgery/procedure for treatment of Breast surgery .    Proposed Surgery/ Procedure: Lumpectomy   Date of Surgery/ Procedure: 19  Time of Surgery/ Procedure: 7 am   Hospital/Surgical Facility: [facility]  Fax number for surgical facility: [phone]  Primary Physician: Shania Yeung  Type of Anesthesia Anticipated: to be determined    Patient has a Health Care Directive or Living Will:  NO    1. NO - Do you have a history of heart attack, stroke, stent, bypass or surgery on an artery in the head, neck, heart or legs?  2. YES - DO YOU EVER HAVE ANY PAIN OR DISCOMFORT IN YOUR CHEST?  See HPI   3. NO - Do you have a history of  Heart Failure?  4. YES - ARE YOUR TROUBLED BY SHORTNESS OF BREATH WHEN WALKING ON THE LEVEL, UP A SLIGHT HILL OR AT NIGHT?    5. NO - Do you currently have a cold, bronchitis or other respiratory infection?  6. NO - Do you have a cough, shortness of breath or wheezing?  7. NO - Do you sometimes get pains in the calves of your legs when you walk?  8. YES - DO YOU OR ANYONE IN YOUR FAMILY HAVE PREVIOUS HISTORY OF BLOOD CLOTS? Mother   9. NO - Do you or does anyone in your family have a serious bleeding problem such as prolonged bleeding following surgeries or cuts?  10. NO - Have you ever had problems with anemia or been told to take iron pills?  11. NO - Have you had any abnormal blood loss such as black, tarry or bloody stools, or abnormal vaginal bleeding?  12. NO - Have you ever had a blood transfusion?  13. NO - Have you or any of your relatives ever had problems with anesthesia?  14. NO - Do you have sleep apnea, excessive  snoring or daytime drowsiness?  15. NO - Do you have any prosthetic heart valves?  16. NO - Do you have prosthetic joints?  17. NO - Is there any chance that you may be pregnant?      HPI:   Cristobal Darnell is a 44 year old female who presents with breast symptom. Symptom onset has been sudden, unchanged for a time period of 2 months. Severity is described as mild. Course of her symptoms over time is unchanged.    See problem list for active medical problems.  Problems all longstanding and stable, except as noted/documented.  See ROS for pertinent symptoms related to these conditions.      MEDICAL HISTORY:     Patient Active Problem List    Diagnosis Date Noted     Major depressive disorder, single episode, moderate degree (H)      Priority: High     Psychotherapy [facility] counseling       Cervicalgia 02/16/2017     Priority: Medium     Myofascial muscle pain 02/16/2017     Priority: Medium     Chronic low back pain 10/25/2011     Priority: Medium     physical therapy, Dr. Bragg, sports medicine        Obesity 05/16/2011     Priority: Medium     Patellofemoral disorder, unspecified laterality 08/09/2018     Priority: Low     TMJ (temporomandibular joint disorder)      Priority: Low     Female stress incontinence 09/14/2015     Priority: Low     Intertrigo 07/02/2015     Priority: Low     GERD (gastroesophageal reflux disease)      Priority: Low     Pain in thoracic spine 10/10/2013     Priority: Low     Migraines 05/22/2012     Priority: Low     Elevated blood uric acid level 10/26/2011     Priority: Low     CTS (carpal tunnel syndrome) - bilateral 10/25/2011     Priority: Low     Failed trial of wrist splints, orthopedics referral        CARDIOVASCULAR SCREENING; LDL GOAL LESS THAN 160 10/31/2010     Priority: Low     Vitamin B 12 deficiency      Priority: Low      Past Medical History:   Diagnosis Date     Anxiety      Chondromalacia patella      Elevated uric acid 10/26/2011     GERD (gastroesophageal reflux  disease)      HDL lipoprotein deficiency      Major depressive disorder, single episode, moderate degree (H)      Microscopic hematuria 10/1/2012     Migraine headaches 5/3/2010     Migraines      Plantar fasciitis      TMJ (temporomandibular joint disorder)      Vitamin B 12 deficiency      Past Surgical History:   Procedure Laterality Date     NO HISTORY OF SURGERY       Current Outpatient Medications   Medication Sig Dispense Refill     acetaminophen (TYLENOL) 650 MG CR tablet Take 1 tablet (650 mg) by mouth every 8 hours as needed for pain 60 tablet 1     cholecalciferol (VITAMIN D3) 1000 UNIT tablet Take 1 tablet (1,000 Units) by mouth daily 90 tablet 3     cyanocobalamin (VITAMIN  B-12) 1000 MCG tablet Take 1 tablet (1,000 mcg) by mouth daily 90 tablet 3     fish oil-omega-3 fatty acids 1000 MG capsule TAKE ONE CAPSULE BY MOUTH ONCE DAILY 90 each 3     Multiple Vitamin (DAILY CHRISTIAN) TABS Take 1 tablet by mouth daily 90 tablet 3     naproxen (NAPROSYN) 500 MG tablet Take 1 tablet (500 mg) by mouth 2 times daily as needed for moderate pain 60 tablet 11     sertraline (ZOLOFT) 50 MG tablet Take 1 tablet (50 mg) by mouth daily 30 tablet 1     topiramate (TOPAMAX) 25 MG tablet Take 1 tablet (25 mg) by mouth 2 times daily       OTC products: None, except as noted above    Allergies   Allergen Reactions     Citalopram      Dizziness at 40 mg      Voltaren GI Disturbance     Zoloft      dizziness      Latex Allergy: NO    Social History     Tobacco Use     Smoking status: Never Smoker     Smokeless tobacco: Never Used   Substance Use Topics     Alcohol use: No     Alcohol/week: 0.0 oz     History   Drug Use No     Social History     Socioeconomic History     Marital status: Legally      Spouse name: Taran     Number of children: 5     Years of education: 10     Highest education level: Not on file   Occupational History     Employer: [employer]   Social Needs     Financial resource strain: Not on file     Food  insecurity:     Worry: Not on file     Inability: Not on file     Transportation needs:     Medical: Not on file     Non-medical: Not on file   Tobacco Use     Smoking status: Never Smoker     Smokeless tobacco: Never Used   Substance and Sexual Activity     Alcohol use: No     Alcohol/week: 0.0 oz     Drug use: No     Sexual activity: Not Currently     Partners: Male     Birth control/protection: IUD   Lifestyle     Physical activity:     Days per week: Not on file     Minutes per session: Not on file     Stress: Not on file   Relationships     Social connections:     Talks on phone: Not on file     Gets together: Not on file     Attends Yazidism service: Not on file     Active member of club or organization: Not on file     Attends meetings of clubs or organizations: Not on file     Relationship status: Not on file     Intimate partner violence:     Fear of current or ex partner: Not on file     Emotionally abused: Not on file     Physically abused: Not on file     Forced sexual activity: Not on file   Other Topics Concern     Parent/sibling w/ CABG, MI or angioplasty before 65F 55M? Not Asked   Social History Narrative     Not on file     Family History   Problem Relation Age of Onset     Diabetes Mother      Hypertension Mother      C.A.D. Mother 58        MI      Depression Mother      Deep Vein Thrombosis Mother      Cancer Other         cousin, unknown      Asthma Son       REVIEW OF SYSTEMS:   CONSTITUTIONAL: NEGATIVE for fever, chills, change in weight  INTEGUMENTARY/SKIN: NEGATIVE for worrisome rashes, moles or lesions  EYES: NEGATIVE for vision changes or irritation  ENT/MOUTH: NEGATIVE for ear, mouth and throat problems  RESP:as above  BREAST: see HPI   CV: see HPI   GI: NEGATIVE for nausea, abdominal pain, heartburn, or change in bowel habits  : NEGATIVE for frequency, dysuria, or hematuria  MUSCULOSKELETAL: NEGATIVE for significant arthralgias or myalgia  NEURO: NEGATIVE for weakness, dizziness or  "paresthesias  ENDOCRINE: NEGATIVE for temperature intolerance, skin/hair changes  HEME: NEGATIVE for bleeding problems  PSYCHIATRIC: stressors; HX of depression   Complete ROS otherwise negative.     EXAM:   /60   Pulse 75   Temp 97.5  F (36.4  C) (Oral)   Resp 20   Ht 1.527 m (5' 0.12\")   Wt 75.9 kg (167 lb 6.4 oz)   SpO2 100%   BMI 32.56 kg/m      GENERAL APPEARANCE: alert and no distress     EYES: EOMI, PERRL     HENT: ear canals and TM's normal and nose and mouth without ulcers or lesions     NECK: no adenopathy, no asymmetry, masses, or scars and thyroid normal to palpation     RESP: lungs clear to auscultation - no rales, rhonchi or wheezes     CV: regular rates and rhythm, normal S1 S2, no S3 or S4 and no murmur, click or rub     ABDOMEN:  soft, nontender, no HSM or masses and bowel sounds normal     MS: extremities normal- no gross deformities noted, no evidence of inflammation in joints, FROM in all extremities.     SKIN: no suspicious lesions or rashes     NEURO: Normal strength and tone, sensory exam grossly normal, mentation intact and speech normal     PSYCH: mentation appears normal. and affect normal/bright     LYMPHATICS: No cervical adenopathy    DIAGNOSTICS:   EKG: Sinus  Rhythm   Low voltage in precordial leads.     ABNORMAL   No comparison    Recent Labs   Lab Test 05/22/12  1553 10/25/11  1216   HGB 13.0 13.4    251     --    POTASSIUM 4.0  --    CR 0.82  --         IMPRESSION:   Reason for surgery/procedure: breast symptom   Diagnosis/reason for consult: chest pain, obesity     The proposed surgical procedure is considered LOW risk.    REVISED CARDIAC RISK INDEX  The patient has the following serious cardiovascular risks for perioperative complications such as (MI, PE, VFib and 3  AV Block):  No serious cardiac risks  INTERPRETATION: 0 risks: Class I (very low risk - 0.4% complication rate)    The patient has the following additional risks for perioperative " complications:  Recent chest pain       ICD-10-CM    1. Preop general physical exam Z01.818    2. Breast pain N64.4    3. Atypical chest pain R07.89 EKG 12-lead complete w/read - Clinics     XR Chest 2 Views     CBC with platelets differential     Echocardiogram Exercise Stress   4. Major depressive disorder, single episode, moderate degree (H) F32.1        RECOMMENDATIONS:   --Patient is to take all scheduled medications on the day of surgery EXCEPT for modifications listed below.    Anticoagulant or Antiplatelet Medication Use  NSAIDS: Naproxen (Naprosyn):   Stop 2-3 days prior to surgery        Pending review of diagnostic evaluation including stress echocardiogram, APPROVAL GIVEN to proceed with proposed procedure        Signed Electronically by: Shania Yeung MD    Copy of this evaluation report is provided to requesting physician.    [facility] Preop Guidelines    Revised Cardiac Risk Index

## 2019-08-14 NOTE — PATIENT INSTRUCTIONS
Call the imaging center at [phone] or  [phone] to schedule your stress test.    Before Your Surgery      Call your surgeon if there is any change in your health. This includes signs of a cold or flu (such as a sore throat, runny nose, cough, rash or fever).    Do not smoke, drink alcohol or take over the counter medicine (unless your surgeon or primary care doctor tells you to) for the 24 hours before and after surgery.    If you take prescribed drugs: Follow your doctor s orders about which medicines to take and which to stop until after surgery.    Eating and drinking prior to surgery: follow the instructions from your surgeon    Take a shower or bath the night before surgery. Use the soap your surgeon gave you to gently clean your skin. If you do not have soap from your surgeon, use your regular soap. Do not shave or scrub the surgery site.  Wear clean pajamas and have clean sheets on your bed.

## 2019-08-20 DIAGNOSIS — F32.1 MAJOR DEPRESSIVE DISORDER, SINGLE EPISODE, MODERATE DEGREE (H): ICD-10-CM

## 2019-08-20 NOTE — TELEPHONE ENCOUNTER
"Requested Prescriptions   Pending Prescriptions Disp Refills     sertraline (ZOLOFT) 50 MG tablet [Pharmacy Med Name: SERTRALINE 50MG TABLETS]  Last Written Prescription Date:  05/29/19  Last Fill Quantity: 30,  # refills: 1   Last Office Visit with [facility], [facility] or [facility] prescribing provider:  08/14/19-Anjana   Future Office Visit:    30 tablet 0     Sig: TAKE ONE TABLET BY MOUTH DAILY       SSRIs Protocol Failed - 8/20/2019  9:59 AM        Failed - PHQ-9 score less than 5 in past 6 months     Please review last PHQ-9 score.           Passed - Medication is active on med list        Passed - Patient is age 18 or older        Passed - No active pregnancy on record        Passed - No positive pregnancy test in last 12 months        Passed - Recent (6 mo) or future (30 days) visit within the authorizing provider's specialty     Patient had office visit in the last 6 months or has a visit in the next 30 days with authorizing provider or within the authorizing provider's specialty.  See \"Patient Info\" tab in inbasket, or \"Choose Columns\" in Meds & Orders section of the refill encounter.              "

## 2019-08-21 ENCOUNTER — ANCILLARY PROCEDURE (OUTPATIENT)
Dept: CARDIOLOGY | Facility: CLINIC | Age: 44
End: 2019-08-21
Attending: FAMILY MEDICINE
Payer: COMMERCIAL

## 2019-08-21 DIAGNOSIS — R07.89 ATYPICAL CHEST PAIN: ICD-10-CM

## 2019-08-21 PROCEDURE — 93016 CV STRESS TEST SUPVJ ONLY: CPT | Performed by: INTERNAL MEDICINE

## 2019-08-21 PROCEDURE — 93017 CV STRESS TEST TRACING ONLY: CPT | Performed by: INTERNAL MEDICINE

## 2019-08-21 PROCEDURE — 93325 DOPPLER ECHO COLOR FLOW MAPG: CPT | Mod: 26 | Performed by: INTERNAL MEDICINE

## 2019-08-21 PROCEDURE — 93350 STRESS TTE ONLY: CPT | Mod: TC | Performed by: INTERNAL MEDICINE

## 2019-08-21 PROCEDURE — 93350 STRESS TTE ONLY: CPT | Mod: 26 | Performed by: INTERNAL MEDICINE

## 2019-08-21 PROCEDURE — 93321 DOPPLER ECHO F-UP/LMTD STD: CPT | Mod: TC | Performed by: INTERNAL MEDICINE

## 2019-08-21 PROCEDURE — 93352 ADMIN ECG CONTRAST AGENT: CPT | Performed by: INTERNAL MEDICINE

## 2019-08-21 PROCEDURE — 93325 DOPPLER ECHO COLOR FLOW MAPG: CPT | Mod: TC | Performed by: INTERNAL MEDICINE

## 2019-08-21 PROCEDURE — 40000264 ZZHC STATISTIC IV PUSH SINGLE INITIAL SUBSTANCE: Performed by: INTERNAL MEDICINE

## 2019-08-21 PROCEDURE — 93321 DOPPLER ECHO F-UP/LMTD STD: CPT | Mod: 26 | Performed by: INTERNAL MEDICINE

## 2019-08-21 PROCEDURE — 93018 CV STRESS TEST I&R ONLY: CPT | Performed by: INTERNAL MEDICINE

## 2019-08-21 RX ADMIN — Medication 3 ML: at 09:15

## 2019-08-21 NOTE — RESULT ENCOUNTER NOTE
"Please call patient and schedule pre op dobutamine stress test:    Your stress test was not abnormal, but was \"submaximal\" as it wasn't able to test your heart enough for a result. I recommend completing a dobutamine stress test.    Shania Yeung MD  "

## 2019-08-23 NOTE — TELEPHONE ENCOUNTER
Routing refill request to provider for review/approval because:  PHQ-9 SCORE 2/4/2019 5/29/2019 8/14/2019   PHQ-9 Total Score - - -   PHQ-9 Total Score 14 15 9

## 2019-09-04 ENCOUNTER — ANCILLARY PROCEDURE (OUTPATIENT)
Dept: CARDIOLOGY | Facility: CLINIC | Age: 44
End: 2019-09-04
Attending: FAMILY MEDICINE
Payer: COMMERCIAL

## 2019-09-04 DIAGNOSIS — R07.89 ATYPICAL CHEST PAIN: ICD-10-CM

## 2019-09-04 DIAGNOSIS — R93.1 EQUIVOCAL STRESS ECHOCARDIOGRAM: ICD-10-CM

## 2019-09-04 PROCEDURE — 93016 CV STRESS TEST SUPVJ ONLY: CPT | Performed by: INTERNAL MEDICINE

## 2019-09-04 PROCEDURE — 93018 CV STRESS TEST I&R ONLY: CPT | Performed by: INTERNAL MEDICINE

## 2019-09-04 PROCEDURE — 93352 ADMIN ECG CONTRAST AGENT: CPT | Performed by: INTERNAL MEDICINE

## 2019-09-04 PROCEDURE — 93325 DOPPLER ECHO COLOR FLOW MAPG: CPT | Performed by: INTERNAL MEDICINE

## 2019-09-04 PROCEDURE — 93017 CV STRESS TEST TRACING ONLY: CPT | Performed by: INTERNAL MEDICINE

## 2019-09-04 PROCEDURE — 93350 STRESS TTE ONLY: CPT | Performed by: INTERNAL MEDICINE

## 2019-09-04 PROCEDURE — 93321 DOPPLER ECHO F-UP/LMTD STD: CPT | Performed by: INTERNAL MEDICINE

## 2019-09-04 RX ORDER — METOPROLOL TARTRATE 1 MG/ML
5 INJECTION, SOLUTION INTRAVENOUS EVERY 5 MIN PRN
Status: DISCONTINUED | OUTPATIENT
Start: 2019-09-04 | End: 2020-08-21

## 2019-09-04 RX ORDER — ATROPINE SULFATE 0.4 MG/ML
0.4 AMPUL (ML) INJECTION ONCE
Status: COMPLETED | OUTPATIENT
Start: 2019-09-04 | End: 2019-09-04

## 2019-09-04 RX ORDER — DOBUTAMINE HYDROCHLORIDE 200 MG/100ML
2.5-2 INJECTION INTRAVENOUS CONTINUOUS
Status: DISCONTINUED | OUTPATIENT
Start: 2019-09-04 | End: 2020-08-21

## 2019-09-04 RX ADMIN — METOPROLOL TARTRATE 4 MG: 1 INJECTION, SOLUTION INTRAVENOUS at 09:54

## 2019-09-04 RX ADMIN — Medication 5 ML: at 09:54

## 2019-09-04 RX ADMIN — DOBUTAMINE HYDROCHLORIDE 40 MCG/KG/MIN: 200 INJECTION INTRAVENOUS at 09:53

## 2019-09-04 RX ADMIN — Medication 0.8 MG: at 09:53

## 2019-09-04 NOTE — LETTER
[facility]  [address] [place] [address]    2019    Abdoul Arroyo  [address]  [place] [address]    Dear Abdoul,    Your results are normal.     Enclosed is a copy of your results.     Results for orders placed or performed in visit on 19   Echocardiogram Dobutamine Stress    Narrative    [id number]  [id number]  [id number]  [id number]^BRANDAN^MASSIMO           [facility]  Echocardiography Laboratory  [address]  [place] [address]        Name: ABDOUL ARROYO  MRN: [id number]  : 1975  Study Date: 2019 08:55 AM  Age: 44 yrs  Gender: Female  Patient [facility]: [facility]  Reason For Study: Atypical chest pain, Equivocal stress echocardiogram  Ordering Physician: MASSIMO GIPSON  Referring Physician: MASSIMO GIPSON  Performed By: Buffy Morrell RDCS     BSA: 1.7 m2  Height: 60 in  Weight: 167 lb  HR: 61  BP: 124/74 mmHg  _____________________________________________________________________________  __     _____________________________________________________________________________  __        Interpretation Summary     Normal dobutamine stress echocardiogram at target heartrate.  No resting or stress induced regional wall motion abnormalities.  No symptoms during stress.  Normal resting and stress EKGs.  Normal BP and heartrate response to dobutamine.  Normal baseline screening echocardiogram without significant valvular  abnormalities.  _____________________________________________________________________________  __     Stress  Definity (NDC #89922-770-75) given intravenously.  Patient was given 5ml mixture of 1.5ml Definity and 8.5ml saline.  5 ml wasted.  IV start location LAC .  The maximum dose of dobutamine was 40mcg/kg/min.  The maximum dose of atropine was 0.8mg.  The maximum dose of metoprolol was 4mg.  The drug infusion was stopped due to target heart rate achieved.  The  patient did not exhibit any symptoms during drug infusion.  The EKG portion of this stress test was negative for inducible ischemia (see  echo results below).  This was a normal stress echocardiogram with no evidence of stress-induced  ischemia.  Normal left ventricular function and wall motion at rest and post-stress.  The visual ejection fraction is estimated at >70%.     Baseline  Normal baseline electrocardiogram.  Normal left ventricular function and wall motion at rest.  The visual ejection fraction is estimated at 55-60%.     Stress Results     Protocol:  Dobutamine Stress Echo        Maximum Predicted HR:   175 bpm     Target HR: 149 bpm                       % Maximum Predicted HR: 87 %                             StageDurationHeart Rate  BP                                (mm:ss)   (bpm)                           Base            61    124/74                           Peak  8:37      153   152/54                             Stress Duration:   8:37 mm:ss                       Maximum Stress HR: 153 bpm     Left Ventricle  Normal dobutamine stress echocardiogram at target heartrate.  No resting or stress induced regional wall motion abnormalities.  No symptoms during stress.  Normal resting and stress EKGs.  Normal BP and heartrate response to dobutamine.  Normal baseline screening echocardiogram without significant valvular  abnormalities.     Tricuspid Valve  Right ventricular systolic pressure is normal.     Vessels  The aortic root is normal size.     _____________________________________________________________________________  __  MMode/2D Measurements & Calculations  asc Aorta Diam: 3.0 cm           Doppler Measurements & Calculations  Ao V2 max: 114.7 cm/sec  Ao max P.0 mmHg  PA V2 max: 92.3 cm/sec  PA max PG: 3.4 mmHg  PI end-d kenia: 117.3 cm/sec  PI max kenia: 167.2 cm/sec  PI max P.2 mmHg  TR max kenia: 222.0 cm/sec  TR max P.7 mmHg            _____________________________________________________________________________  __           Report approved by: Gabriel Liang 09/04/2019 12:10 PM      If you have any questions or concerns, please call myself or my nurse at [phone].      Sincerely,    Shania Yeung MD/cat    [email or web address]  Mg Imaging Nurse  Mg Card  Mg Stress Rm  Mg Echo Tech  Mg Cv Tech /p

## 2019-09-23 DIAGNOSIS — E63.9 DIETARY DEFICIENCY: ICD-10-CM

## 2019-09-23 DIAGNOSIS — F32.1 MAJOR DEPRESSIVE DISORDER, SINGLE EPISODE, MODERATE DEGREE (H): ICD-10-CM

## 2019-09-24 DIAGNOSIS — E66.812 CLASS 2 OBESITY WITHOUT SERIOUS COMORBIDITY WITH BODY MASS INDEX (BMI) OF 35.0 TO 35.9 IN ADULT, UNSPECIFIED OBESITY TYPE: ICD-10-CM

## 2019-09-24 NOTE — TELEPHONE ENCOUNTER
"Requested Prescriptions   Pending Prescriptions Disp Refills     sertraline (ZOLOFT) 50 MG tablet [Pharmacy Med Name: SERTRALINE 50MG TABLETS]  Last Written Prescription Date:  08/23/19  Last Fill Quantity: 30,  # refills: 0   Last Office Visit with [facility], [facility] or [facility] prescribing provider:  08/14/19-Binsfled   Future Office Visit:      30 tablet 0     Sig: TAKE 1 TABLET BY MOUTH DAILY       SSRIs Protocol Failed - 9/23/2019 10:35 PM        Failed - PHQ-9 score less than 5 in past 6 months     Please review last PHQ-9 score.           Passed - Medication is active on med list        Passed - Patient is age 18 or older        Passed - No active pregnancy on record        Passed - No positive pregnancy test in last 12 months        Passed - Recent (6 mo) or future (30 days) visit within the authorizing provider's specialty     Patient had office visit in the last 6 months or has a visit in the next 30 days with authorizing provider or within the authorizing provider's specialty.  See \"Patient Info\" tab in inbasket, or \"Choose Columns\" in Meds & Orders section of the refill encounter.              "

## 2019-09-26 RX ORDER — CHOLECALCIFEROL (VITAMIN D3) 25 MCG
TABLET ORAL
Qty: 90 TABLET | Refills: 0 | Status: SHIPPED | OUTPATIENT
Start: 2019-09-26 | End: 2020-03-03

## 2019-09-26 RX ORDER — MULTIVITAMIN WITH FOLIC ACID 400 MCG
TABLET ORAL
Qty: 90 TABLET | Refills: 0 | Status: SHIPPED | OUTPATIENT
Start: 2019-09-26 | End: 2019-12-09

## 2019-09-26 NOTE — TELEPHONE ENCOUNTER
Please contact pt to schedule a depression follow up, then re-route to RN refill.    Melita Nair RN  [facility]

## 2019-09-26 NOTE — TELEPHONE ENCOUNTER
Sent    Patient notified and advised her to schedule an appointment for further refills when she is back    Caitlin Parry RN

## 2019-09-26 NOTE — TELEPHONE ENCOUNTER
Signed Prescriptions:                        Disp   Refills    Multiple Vitamin (DAILY-CHRISTIAN) TABS         90 tab*0        Sig: TAKE ONE TABLET BY MOUTH DAILY  Authorizing Provider: MASSIMO GIPSON  Ordering User: ANNA LONG    VITAMIN D3 1000 units tablet               90 tab*0        Sig: TAKE ONE TABLET BY MOUTH DAILY  Authorizing Provider: MASSIMO GIPSON  Ordering User: ANNA LONG RN refilled medication per [facility] Refill Protocol.     Anna Long RN

## 2019-09-26 NOTE — TELEPHONE ENCOUNTER
Spoke with patient, she stated she is traveling and can't make an appointment at the moment. Patient will call to schedule when she returns. Patient also noted she is almost out of medication as she only have 2 pills left and she is requesting a 90 days supply. MAILE Guerra

## 2019-09-27 RX ORDER — TOPIRAMATE 25 MG/1
TABLET, FILM COATED ORAL
Start: 2019-09-27

## 2019-09-28 DIAGNOSIS — M22.41 CHONDROMALACIA OF BOTH PATELLAE: ICD-10-CM

## 2019-09-28 DIAGNOSIS — M22.42 CHONDROMALACIA OF BOTH PATELLAE: ICD-10-CM

## 2019-09-28 DIAGNOSIS — E66.812 CLASS 2 OBESITY WITHOUT SERIOUS COMORBIDITY WITH BODY MASS INDEX (BMI) OF 35.0 TO 35.9 IN ADULT, UNSPECIFIED OBESITY TYPE: ICD-10-CM

## 2019-09-28 NOTE — TELEPHONE ENCOUNTER
"Requested Prescriptions   Pending Prescriptions Disp Refills     MAPAP ARTHRITIS PAIN 650 MG CR tablet [Pharmacy Med Name: MAPAP ARTHRITIS 650MG TABLETS] 60 tablet 0     Sig: TAKE 1 TABLET BY MOUTH EVERY 8 HOURS AS NEEDED FOR PAIN         Last Written Prescription Date:  5/29/19  Last Fill Quantity: 60,  # refills: 1   Last Office Visit with [facility] or [facility] prescribing provider:  8/14/19   Future Office Visit:         Analgesics (Non-Narcotic Tylenol and ASA Only) Passed - 9/28/2019  4:37 PM        Passed - Recent (12 mo) or future (30 days) visit within the authorizing provider's specialty     Patient has had an office visit with the authorizing provider or a provider within the authorizing providers department within the previous 12 mos or has a future within next 30 days. See \"Patient Info\" tab in inbasket, or \"Choose Columns\" in Meds & Orders section of the refill encounter.              Passed - Patient is 7 months old or older     If patient is a peds patient of the age 7 mos -12 years, ok to refill using weight-based dosing.     If >3g daily and/or sig is not \"prn\", check for liver enzymes. If normal in the last year, ok to refill.  If not, refer to the provider.          Passed - Medication is active on med list              Clarence Faarax  [facility]  "

## 2019-09-30 ENCOUNTER — TELEPHONE (OUTPATIENT)
Dept: SURGERY | Facility: CLINIC | Age: 44
End: 2019-09-30

## 2019-09-30 DIAGNOSIS — E66.9 OBESITY: Primary | ICD-10-CM

## 2019-09-30 RX ORDER — TOPIRAMATE 25 MG/1
TABLET, FILM COATED ORAL
Qty: 90 TABLET | Refills: 0 | OUTPATIENT
Start: 2019-09-30

## 2019-09-30 RX ORDER — TOPIRAMATE 25 MG/1
75 TABLET, FILM COATED ORAL AT BEDTIME
Qty: 180 TABLET | Refills: 0 | Status: SHIPPED | OUTPATIENT
Start: 2019-09-30 | End: 2020-04-27

## 2019-09-30 NOTE — TELEPHONE ENCOUNTER
topiramate (TOPAMAX) 25 MG tablet      Last Written Prescription Date:  Requested direction not on med list  Discontinued Stopped by Patient Sandie JuradoDAINA 8/9/18 1030         Last Office Visit : 3-27-18  Future Office visit:  11-11-19    Routing refill request to provider for review/approval because:  Requested med direction not on med list  Current med list: historical, direction do not match requested.  Gap in refills    [place] ? Med refill

## 2019-09-30 NOTE — TELEPHONE ENCOUNTER
Recieved refill request for topiramate (TOPAMAX) 25 MG tablet . Patient needs appointment scheduled prior to any refills. Clinic Coordinator notified and will follow up with the patient as appropriate. The pharmacy has been notified that the medication will not be refilled prior to an appointment being scheduled.      Medication with requested directions was not active on med list. On med list med with different direction :Historical.    Scheduling has been notified to contact the pt for appointment.  FYI to Clinic Nurse

## 2019-09-30 NOTE — TELEPHONE ENCOUNTER
From Scheduling: Pt is wondering can she get a refill before her appt? She said she is out of the medication pt says she is leaving the country on 10[place]08 and does not return until 11/07. So she wanted the appt the week after she came back.I advised pt I was unsure about the refill decision and would send a msg back for her to see if she could get a refill     topiramate (TOPAMAX) 25 MG tablet  Pt request  Last Date Filled: unknown  Qty unknown    Current med list historical  Requested med discontinue on 8-9-18   Discontinued Stopped by Patient Sandie Jurado CMA 8/9/18 [id number]

## 2019-09-30 NOTE — TELEPHONE ENCOUNTER
----- Message from Anna Boyd RN sent at 9/30/2019  9:55 AM CDT -----  Regarding: scheduling: wt managment pt of Maikol  Please call to schedule.     Thanks     Due for RTC     I do not need any follow up on the scheduling of this appointment unless the patient will no longer be receiving care in our clinic.

## 2019-09-30 NOTE — TELEPHONE ENCOUNTER
Spoke with pt and was able to get her scheduled for 11/11 with provider. But pt had questions regarding med refill.    Pt is wondering can she get a refill before her appt? She said she is out of the medication pt says she is leaving the country on 10/08 and does not return until 11/07. So she wanted the appt the week after she came back.I advised pt I was unsure about the refill decision and would send a msg back for her to see if she could get a refill    Please advise     Thanks  Todd

## 2019-10-01 RX ORDER — ACETAMINOPHEN 650 MG/1
TABLET, FILM COATED, EXTENDED RELEASE ORAL
Qty: 60 TABLET | Refills: 0 | Status: SHIPPED | OUTPATIENT
Start: 2019-10-01 | End: 2020-07-28

## 2019-10-01 NOTE — TELEPHONE ENCOUNTER
Prescription approved per [facility] Refill Protocol.  Maddison Bowers RN

## 2019-11-05 ENCOUNTER — DOCUMENTATION ONLY (OUTPATIENT)
Dept: CARE COORDINATION | Facility: CLINIC | Age: 44
End: 2019-11-05

## 2019-11-09 DIAGNOSIS — E53.8 VITAMIN B 12 DEFICIENCY: ICD-10-CM

## 2019-11-09 RX ORDER — LANOLIN ALCOHOL/MO/W.PET/CERES
CREAM (GRAM) TOPICAL
Qty: 90 TABLET | Refills: 0 | Status: SHIPPED | OUTPATIENT
Start: 2019-11-09 | End: 2020-02-25

## 2019-11-10 NOTE — TELEPHONE ENCOUNTER
Prescription approved per [facility] Refill Protocol.  Maddison Bowers RN

## 2019-12-09 ENCOUNTER — ANCILLARY PROCEDURE (OUTPATIENT)
Dept: GENERAL RADIOLOGY | Facility: CLINIC | Age: 44
End: 2019-12-09
Attending: FAMILY MEDICINE
Payer: COMMERCIAL

## 2019-12-09 ENCOUNTER — OFFICE VISIT (OUTPATIENT)
Dept: FAMILY MEDICINE | Facility: CLINIC | Age: 44
End: 2019-12-09
Payer: COMMERCIAL

## 2019-12-09 VITALS
HEART RATE: 73 BPM | SYSTOLIC BLOOD PRESSURE: 112 MMHG | BODY MASS INDEX: 32.39 KG/M2 | DIASTOLIC BLOOD PRESSURE: 74 MMHG | WEIGHT: 165 LBS | RESPIRATION RATE: 18 BRPM | OXYGEN SATURATION: 97 % | HEIGHT: 60 IN | TEMPERATURE: 98.3 F

## 2019-12-09 DIAGNOSIS — E63.9 DIETARY DEFICIENCY: ICD-10-CM

## 2019-12-09 DIAGNOSIS — G89.29 CHRONIC RIGHT SHOULDER PAIN: ICD-10-CM

## 2019-12-09 DIAGNOSIS — M25.511 CHRONIC RIGHT SHOULDER PAIN: ICD-10-CM

## 2019-12-09 DIAGNOSIS — M22.2X9 PATELLOFEMORAL DISORDER, UNSPECIFIED LATERALITY: ICD-10-CM

## 2019-12-09 DIAGNOSIS — M54.50 CHRONIC BILATERAL LOW BACK PAIN, UNSPECIFIED WHETHER SCIATICA PRESENT: ICD-10-CM

## 2019-12-09 DIAGNOSIS — N39.3 FEMALE STRESS INCONTINENCE: Primary | ICD-10-CM

## 2019-12-09 DIAGNOSIS — G56.03 BILATERAL CARPAL TUNNEL SYNDROME: ICD-10-CM

## 2019-12-09 DIAGNOSIS — G89.29 CHRONIC BILATERAL LOW BACK PAIN, UNSPECIFIED WHETHER SCIATICA PRESENT: ICD-10-CM

## 2019-12-09 DIAGNOSIS — F32.1 MAJOR DEPRESSIVE DISORDER, SINGLE EPISODE, MODERATE DEGREE (H): ICD-10-CM

## 2019-12-09 LAB
ALBUMIN UR-MCNC: NEGATIVE MG/DL
APPEARANCE UR: CLEAR
BILIRUB UR QL STRIP: NEGATIVE
COLOR UR AUTO: YELLOW
GLUCOSE UR STRIP-MCNC: NEGATIVE MG/DL
HGB UR QL STRIP: ABNORMAL
KETONES UR STRIP-MCNC: NEGATIVE MG/DL
LEUKOCYTE ESTERASE UR QL STRIP: NEGATIVE
NITRATE UR QL: NEGATIVE
NON-SQ EPI CELLS #/AREA URNS LPF: ABNORMAL /LPF
PH UR STRIP: 6 PH (ref 5–7)
RBC #/AREA URNS AUTO: ABNORMAL /HPF
SOURCE: ABNORMAL
SP GR UR STRIP: <=1.005 (ref 1–1.03)
UROBILINOGEN UR STRIP-ACNC: 0.2 EU/DL (ref 0.2–1)
WBC #/AREA URNS AUTO: ABNORMAL /HPF

## 2019-12-09 PROCEDURE — 73030 X-RAY EXAM OF SHOULDER: CPT | Mod: RT

## 2019-12-09 PROCEDURE — 81001 URINALYSIS AUTO W/SCOPE: CPT | Performed by: FAMILY MEDICINE

## 2019-12-09 PROCEDURE — 99214 OFFICE O/P EST MOD 30 MIN: CPT | Performed by: FAMILY MEDICINE

## 2019-12-09 RX ORDER — SENNOSIDES 8.6 MG
CAPSULE ORAL
Qty: 60 TABLET | Refills: 11 | Status: CANCELLED | OUTPATIENT
Start: 2019-12-09

## 2019-12-09 RX ORDER — TOPIRAMATE 25 MG/1
75 TABLET, FILM COATED ORAL AT BEDTIME
Qty: 180 TABLET | Refills: 0 | Status: CANCELLED | OUTPATIENT
Start: 2019-12-09

## 2019-12-09 RX ORDER — MULTIVITAMIN WITH FOLIC ACID 400 MCG
1 TABLET ORAL DAILY
Qty: 90 TABLET | Refills: 3 | Status: SHIPPED | OUTPATIENT
Start: 2019-12-09 | End: 2020-08-28

## 2019-12-09 RX ORDER — TOPIRAMATE 25 MG/1
25 TABLET, FILM COATED ORAL 2 TIMES DAILY
Status: CANCELLED | OUTPATIENT
Start: 2019-12-09

## 2019-12-09 ASSESSMENT — PAIN SCALES - GENERAL: PAINLEVEL: SEVERE PAIN (7)

## 2019-12-09 ASSESSMENT — PATIENT HEALTH QUESTIONNAIRE - PHQ9: SUM OF ALL RESPONSES TO PHQ QUESTIONS 1-9: 17

## 2019-12-09 ASSESSMENT — MIFFLIN-ST. JEOR: SCORE: 1316.85
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
## 2019-12-09 NOTE — LETTER
[facility]  [address] [place] [address]    December 10, 2019    Cristobal Darnell  [address] [address]    Dear Cristobal,    Your shoulder x-ray shows no significant arthritis or broken bones.     Enclosed is a copy of your results.     Results for orders placed or performed in visit on 12/09/19   UA reflex to Microscopic and Culture     Status: Abnormal   Result Value Ref Range    Color Urine Yellow     Appearance Urine Clear     Glucose Urine Negative NEG^Negative mg/dL    Bilirubin Urine Negative NEG^Negative    Ketones Urine Negative NEG^Negative mg/dL    Specific Gravity Urine <=1.005 1.003 - 1.035    Blood Urine Small (A) NEG^Negative    pH Urine 6.0 5.0 - 7.0 pH    Protein Albumin Urine Negative NEG^Negative mg/dL    Urobilinogen Urine 0.2 0.2 - 1.0 EU/dL    Nitrite Urine Negative NEG^Negative    Leukocyte Esterase Urine Negative NEG^Negative    Source Midstream Urine    Urine Microscopic     Status: Abnormal   Result Value Ref Range    WBC Urine 0 - 5 OTO5^0 - 5 /HPF    RBC Urine 2-5 (A) OTO2^O - 2 /HPF    Squamous Epithelial /LPF Urine Few FEW^Few /LPF       If you have any questions or concerns, please call myself or my nurse at [phone].      Sincerely,        Shania Yeung MD /cat

## 2019-12-09 NOTE — LETTER
[facility]  [address] [place] [address]    December 9, 2019    Cristobal Darnell  [address]  [place] [address]          Dear Cristobal,  You continue to have a small amount of blood in your urine, but no infection was found. See the urologist as we discussed in clinic.   Enclosed is a copy of your results.     Results for orders placed or performed in visit on 12/09/19   UA reflex to Microscopic and Culture     Status: Abnormal   Result Value Ref Range    Color Urine Yellow     Appearance Urine Clear     Glucose Urine Negative NEG^Negative mg/dL    Bilirubin Urine Negative NEG^Negative    Ketones Urine Negative NEG^Negative mg/dL    Specific Gravity Urine <=1.005 1.003 - 1.035    Blood Urine Small (A) NEG^Negative    pH Urine 6.0 5.0 - 7.0 pH    Protein Albumin Urine Negative NEG^Negative mg/dL    Urobilinogen Urine 0.2 0.2 - 1.0 EU/dL    Nitrite Urine Negative NEG^Negative    Leukocyte Esterase Urine Negative NEG^Negative    Source Midstream Urine    Urine Microscopic     Status: Abnormal   Result Value Ref Range    WBC Urine 0 - 5 OTO5^0 - 5 /HPF    RBC Urine 2-5 (A) OTO2^O - 2 /HPF    Squamous Epithelial /LPF Urine Few FEW^Few /LPF     If you have any questions or concerns, please call myself or my nurse at [phone].  Sincerely,  Shania Yeung MD/pb

## 2019-12-09 NOTE — RESULT ENCOUNTER NOTE
Mail letter:    You continue to have a small amount of blood in your urine, but no infection was found. See the urologist as we discussed in clinic.    Shania Yeung MD

## 2019-12-10 NOTE — RESULT ENCOUNTER NOTE
Mail letter:    Your shoulder x-ray shows no significant arthritis or broken bones.    Shania Yeung MD
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
## 2019-12-16 ENCOUNTER — THERAPY VISIT (OUTPATIENT)
Dept: PHYSICAL THERAPY | Facility: CLINIC | Age: 44
End: 2019-12-16
Attending: FAMILY MEDICINE
Payer: COMMERCIAL

## 2019-12-16 DIAGNOSIS — G89.29 CHRONIC BILATERAL LOW BACK PAIN, UNSPECIFIED WHETHER SCIATICA PRESENT: ICD-10-CM

## 2019-12-16 DIAGNOSIS — M22.2X9 PATELLOFEMORAL DISORDER, UNSPECIFIED LATERALITY: ICD-10-CM

## 2019-12-16 DIAGNOSIS — M25.511 CHRONIC RIGHT SHOULDER PAIN: ICD-10-CM

## 2019-12-16 DIAGNOSIS — G89.29 CHRONIC LEFT-SIDED LOW BACK PAIN WITHOUT SCIATICA: Primary | ICD-10-CM

## 2019-12-16 DIAGNOSIS — M54.50 CHRONIC BILATERAL LOW BACK PAIN, UNSPECIFIED WHETHER SCIATICA PRESENT: ICD-10-CM

## 2019-12-16 DIAGNOSIS — G89.29 CHRONIC RIGHT SHOULDER PAIN: ICD-10-CM

## 2019-12-16 DIAGNOSIS — M54.50 CHRONIC LEFT-SIDED LOW BACK PAIN WITHOUT SCIATICA: Primary | ICD-10-CM

## 2019-12-16 PROCEDURE — 97163 PT EVAL HIGH COMPLEX 45 MIN: CPT | Mod: GP | Performed by: PHYSICAL THERAPIST

## 2019-12-16 PROCEDURE — 97110 THERAPEUTIC EXERCISES: CPT | Mod: GP | Performed by: PHYSICAL THERAPIST
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
## 2019-12-27 DIAGNOSIS — F32.1 MAJOR DEPRESSIVE DISORDER, SINGLE EPISODE, MODERATE DEGREE (H): ICD-10-CM

## 2019-12-30 NOTE — TELEPHONE ENCOUNTER
Writer notes 6 mo supply sent 12/09/19. Declined with note to pharmacy: Please dispense from Rx that was sent electronically 12/09/19 for 6 month supply. E-Prescribing Status: Receipt confirmed by pharmacy (12/9/2019 12:39 PM CST).

## 2020-01-02 ENCOUNTER — THERAPY VISIT (OUTPATIENT)
Dept: PHYSICAL THERAPY | Facility: CLINIC | Age: 45
End: 2020-01-02
Payer: COMMERCIAL

## 2020-01-02 DIAGNOSIS — G89.29 CHRONIC LEFT-SIDED LOW BACK PAIN WITHOUT SCIATICA: ICD-10-CM

## 2020-01-02 DIAGNOSIS — M54.50 CHRONIC LEFT-SIDED LOW BACK PAIN WITHOUT SCIATICA: ICD-10-CM

## 2020-01-02 PROCEDURE — 97112 NEUROMUSCULAR REEDUCATION: CPT | Mod: GP | Performed by: PHYSICAL THERAPIST

## 2020-01-02 PROCEDURE — 97110 THERAPEUTIC EXERCISES: CPT | Mod: GP | Performed by: PHYSICAL THERAPIST

## 2020-01-03 ENCOUNTER — OFFICE VISIT (OUTPATIENT)
Dept: UROLOGY | Facility: CLINIC | Age: 45
End: 2020-01-03
Attending: FAMILY MEDICINE
Payer: COMMERCIAL

## 2020-01-03 VITALS
WEIGHT: 165 LBS | HEIGHT: 60 IN | DIASTOLIC BLOOD PRESSURE: 76 MMHG | SYSTOLIC BLOOD PRESSURE: 125 MMHG | HEART RATE: 54 BPM | OXYGEN SATURATION: 100 % | BODY MASS INDEX: 32.39 KG/M2

## 2020-01-03 DIAGNOSIS — R31.29 MICROSCOPIC HEMATURIA: Primary | ICD-10-CM

## 2020-01-03 DIAGNOSIS — N39.3 FEMALE STRESS INCONTINENCE: ICD-10-CM

## 2020-01-03 LAB
ALBUMIN UR-MCNC: NEGATIVE MG/DL
APPEARANCE UR: CLEAR
BACTERIA #/AREA URNS HPF: ABNORMAL /HPF
BILIRUB UR QL STRIP: NEGATIVE
COLOR UR AUTO: ABNORMAL
GLUCOSE UR STRIP-MCNC: NEGATIVE MG/DL
HGB UR QL STRIP: ABNORMAL
KETONES UR STRIP-MCNC: NEGATIVE MG/DL
LEUKOCYTE ESTERASE UR QL STRIP: ABNORMAL
NITRATE UR QL: NEGATIVE
NON-SQ EPI CELLS #/AREA URNS LPF: ABNORMAL /LPF
PH UR STRIP: 5 PH (ref 5–7)
RBC #/AREA URNS AUTO: ABNORMAL /HPF
SOURCE: ABNORMAL
SP GR UR STRIP: 1.01 (ref 1–1.03)
UROBILINOGEN UR STRIP-MCNC: NORMAL MG/DL (ref 0–2)
WBC #/AREA URNS AUTO: ABNORMAL /HPF

## 2020-01-03 PROCEDURE — 81001 URINALYSIS AUTO W/SCOPE: CPT | Performed by: PHYSICIAN ASSISTANT

## 2020-01-03 PROCEDURE — 88112 CYTOPATH CELL ENHANCE TECH: CPT | Performed by: PHYSICIAN ASSISTANT

## 2020-01-03 PROCEDURE — 99214 OFFICE O/P EST MOD 30 MIN: CPT | Performed by: PHYSICIAN ASSISTANT

## 2020-01-03 ASSESSMENT — MIFFLIN-ST. JEOR: SCORE: 1314.94

## 2020-01-03 ASSESSMENT — PAIN SCALES - GENERAL: PAINLEVEL: NO PAIN (0)

## 2020-01-03 NOTE — NURSING NOTE
Cristobal Darnell's goals for this visit include:   Chief Complaint   Patient presents with     Incontinence     x 5 years.        She requests these members of her care team be copied on today's visit information: yes    PCP: Shania Yeung    Referring Provider:  Shania Yeung MD  [address]  [place] [address]    /76 (BP Location: Left arm, Patient Position: Sitting, Cuff Size: Adult Regular)   Pulse 54   Ht 1.524 m (5')   Wt 74.8 kg (165 lb)   LMP  (LMP Unknown)   SpO2 100%   BMI 32.22 kg/m      Do you need any medication refills at today's visit? No    PVR: 26mL    Gay Casillas LPN

## 2020-01-03 NOTE — PATIENT INSTRUCTIONS
UROLOGY CLINIC VISIT PATIENT INSTRUCTIONS    Schedule CT scan followed by cystoscopy with Dr. Roca next available. She can also discuss surgical options for the urinary incontinence with you at that time.    CYSTOSCOPY    What is a Cystoscopy?  This is a procedure done to check for problems inside the bladder.  Problems may include polyps (growths), tumors, inflammation (swelling and redness) and other concerns.    The doctor inserts a thin tube (called a cystoscope) into the bladder.  The tube is about the size of a pencil.  We will give you numbing medicine to reduce the pain or discomfort you may feel.    The tube allows the doctor to:  The doctor will be able to see inside the bladder by filling the bladder with water.  The water makes it easier to see any problems that may be present.    If needed, the doctor may use the tube to:  The doctor is able to take tissue samples (biopsies).  Samples are sent to the lab for testing.  The doctor can also burn off any small growths or tumors that are found.  This is call fulguration.    How should I get ready for the exam?  To prepare, stop taking any medications as instructed. Ask whether you should avoid eating or drinking anything after midnight before the procedure. Follow any other instructions your doctor gives you.    If you are having this procedure done at the clinic, you will be there for up to an hour.  You will receive care before and after the procedure.    Please tell your doctor if:  - You have a history of urinary tract infections.  - You know that you have a tumor in your bladder.  - You have bleeding problems.  - You have any allergies.  - You are or may be pregnant.      What happens after the exam?  You may go back to your normal diet and activity as you feel ready, unless your doctor tells you not to.    For the next two days, you may notice:  - Some blood in your urine.  - Some burning when you urinate (use the toilet).  - An urge to urinate more  often.  - Bladder spasms.    These are normal after the procedure. They should go away on their own after a day or two.      - You can help to relieve the above listed symptoms by:  - Drinking 6 to 8 large glasses of water each day (includes drinks at meals).  This will help clear the urine.  - Take warm baths to relieve pain and bladder spasms.  Do not add anything to the bath water.  - Your doctor may prescribe pain medicine.  You may also take Tylenol (acetaminophen) for pain.    When should I call my doctor?  - A fever over 100.0 F (38 C) for more than a day.  (Before you call the doctor, check your temperature under your tongue.)  - Chills.  - Failure to urinate: No urine comes out when you try to use the toilet.  (Try soaking in a bathtub full of warm water.  If still no urine, call your doctor.)  - A lot of blood in the urine or blood clots larger than a nickel.  - Pain in the back or abdomen (belly / stomach area).  - Pain or spasms that are not relieved by warm tub baths and pain medicine.  - Severe pain, burning or other problems while passing urine.  - Pain that gets worse after two days.        If you have any issues, questions or concerns in the meantime, do not hesitate to contact us at [phone] or via [employer].     It was a pleasure meeting with you today.  Thank you for allowing me and my team the privilege of caring for you today.  YOU are the reason we are here, and I truly hope we provided you with the excellent service you deserve.  Please let us know if there is anything else we can do for you so that we can be sure you are leaving completely satisfied with your care experience.

## 2020-01-06 LAB — COPATH REPORT: NORMAL

## 2020-01-07 ENCOUNTER — ANCILLARY PROCEDURE (OUTPATIENT)
Dept: CT IMAGING | Facility: CLINIC | Age: 45
End: 2020-01-07
Attending: PHYSICIAN ASSISTANT
Payer: COMMERCIAL

## 2020-01-07 DIAGNOSIS — R31.29 MICROSCOPIC HEMATURIA: ICD-10-CM

## 2020-01-07 PROCEDURE — 74178 CT ABD&PLV WO CNTR FLWD CNTR: CPT | Performed by: RADIOLOGY

## 2020-01-07 RX ORDER — IOPAMIDOL 755 MG/ML
101 INJECTION, SOLUTION INTRAVASCULAR ONCE
Status: COMPLETED | OUTPATIENT
Start: 2020-01-07 | End: 2020-01-07

## 2020-01-07 RX ADMIN — IOPAMIDOL 101 ML: 755 INJECTION, SOLUTION INTRAVASCULAR at 13:22

## 2020-01-08 ENCOUNTER — OFFICE VISIT (OUTPATIENT)
Dept: ORTHOPEDICS | Facility: CLINIC | Age: 45
End: 2020-01-08
Payer: COMMERCIAL

## 2020-01-08 VITALS
OXYGEN SATURATION: 100 % | BODY MASS INDEX: 32 KG/M2 | WEIGHT: 163 LBS | HEART RATE: 63 BPM | SYSTOLIC BLOOD PRESSURE: 118 MMHG | DIASTOLIC BLOOD PRESSURE: 78 MMHG | HEIGHT: 60 IN

## 2020-01-08 DIAGNOSIS — R20.0 BILATERAL HAND NUMBNESS: Primary | ICD-10-CM

## 2020-01-08 PROCEDURE — 99213 OFFICE O/P EST LOW 20 MIN: CPT | Performed by: ORTHOPAEDIC SURGERY

## 2020-01-08 ASSESSMENT — PAIN SCALES - GENERAL: PAINLEVEL: MODERATE PAIN (5)

## 2020-01-08 ASSESSMENT — MIFFLIN-ST. JEOR: SCORE: 1305.86
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
## 2020-01-16 ENCOUNTER — THERAPY VISIT (OUTPATIENT)
Dept: PHYSICAL THERAPY | Facility: CLINIC | Age: 45
End: 2020-01-16
Payer: COMMERCIAL

## 2020-01-16 DIAGNOSIS — M54.50 CHRONIC LEFT-SIDED LOW BACK PAIN WITHOUT SCIATICA: ICD-10-CM

## 2020-01-16 DIAGNOSIS — G89.29 CHRONIC LEFT-SIDED LOW BACK PAIN WITHOUT SCIATICA: ICD-10-CM

## 2020-01-16 PROCEDURE — 97110 THERAPEUTIC EXERCISES: CPT | Mod: GP | Performed by: PHYSICAL THERAPIST

## 2020-01-16 PROCEDURE — 97530 THERAPEUTIC ACTIVITIES: CPT | Mod: GP | Performed by: PHYSICAL THERAPIST

## 2020-01-29 ENCOUNTER — OFFICE VISIT (OUTPATIENT)
Dept: ORTHOPEDICS | Facility: CLINIC | Age: 45
End: 2020-01-29
Payer: COMMERCIAL

## 2020-01-29 DIAGNOSIS — Z53.9 ERRONEOUS ENCOUNTER--DISREGARD: Primary | ICD-10-CM

## 2020-01-29 NOTE — LETTER
1/29/2020         RE: Cristobal Darnell  [address] [facility] [place] [address]        Dear Colleague,    Thank you for referring your patient, Cristobal Darnell, to the [facility]. Please see a copy of my visit note below.      This encounter was opened in error. Please disregard.    Again, thank you for allowing me to participate in the care of your patient.        Sincerely,        Diego Serna MD

## 2020-01-29 NOTE — NURSING NOTE
Patient did not have EMG done, she will return once that has been completed.  Shawanda Conklin Certified Medical Assistant

## 2020-01-30 ENCOUNTER — TELEPHONE (OUTPATIENT)
Dept: FAMILY MEDICINE | Facility: CLINIC | Age: 45
End: 2020-01-30

## 2020-01-30 DIAGNOSIS — F32.1 MAJOR DEPRESSIVE DISORDER, SINGLE EPISODE, MODERATE DEGREE (H): ICD-10-CM

## 2020-01-30 NOTE — TELEPHONE ENCOUNTER
Patient wants to know if she can increase sertraline (ZOLOFT) from 50mg to 75 mg?  Does patient need office visit to discuss, can E-visit or phone visit be offered.   Usha Bowers RN

## 2020-01-30 NOTE — TELEPHONE ENCOUNTER
Reason for Call:  Other call back and prescription    Detailed comments: Patient wants to know if she can increase sertraline (ZOLOFT) from 50mg to 75 mg?    Phone Number Patient can be reached at: Home number on file [phone] (home)    Best Time: any    Can we leave a detailed message on this number? YES    Call taken on 1/30/2020 at 11:54 AM by Eden Caldera

## 2020-01-31 NOTE — TELEPHONE ENCOUNTER
Patient notified of providers message as written. Patient verbalized understanding, no further questions or concerns.    Usha Bowers RN

## 2020-02-10 ENCOUNTER — OFFICE VISIT (OUTPATIENT)
Dept: UROLOGY | Facility: CLINIC | Age: 45
End: 2020-02-10
Payer: COMMERCIAL

## 2020-02-10 VITALS — DIASTOLIC BLOOD PRESSURE: 74 MMHG | OXYGEN SATURATION: 98 % | HEART RATE: 73 BPM | SYSTOLIC BLOOD PRESSURE: 116 MMHG

## 2020-02-10 DIAGNOSIS — N39.3 STRESS INCONTINENCE: ICD-10-CM

## 2020-02-10 DIAGNOSIS — R31.29 MICROSCOPIC HEMATURIA: Primary | ICD-10-CM

## 2020-02-10 LAB
ALBUMIN UR-MCNC: NEGATIVE MG/DL
APPEARANCE UR: CLEAR
BACTERIA #/AREA URNS HPF: ABNORMAL /HPF
BILIRUB UR QL STRIP: NEGATIVE
COLOR UR AUTO: ABNORMAL
GLUCOSE UR STRIP-MCNC: NEGATIVE MG/DL
HGB UR QL STRIP: ABNORMAL
KETONES UR STRIP-MCNC: NEGATIVE MG/DL
LEUKOCYTE ESTERASE UR QL STRIP: NEGATIVE
NITRATE UR QL: NEGATIVE
NON-SQ EPI CELLS #/AREA URNS LPF: ABNORMAL /LPF
PH UR STRIP: 5.5 PH (ref 5–7)
RBC #/AREA URNS AUTO: ABNORMAL /HPF
SOURCE: ABNORMAL
SP GR UR STRIP: 1.01 (ref 1–1.03)
UROBILINOGEN UR STRIP-MCNC: NORMAL MG/DL (ref 0–2)
WBC #/AREA URNS AUTO: ABNORMAL /HPF

## 2020-02-10 PROCEDURE — 81001 URINALYSIS AUTO W/SCOPE: CPT | Performed by: UROLOGY

## 2020-02-10 PROCEDURE — 52000 CYSTOURETHROSCOPY: CPT | Performed by: UROLOGY

## 2020-02-10 RX ORDER — CEFAZOLIN SODIUM 1 G
1 VIAL (EA) INJECTION SEE ADMIN INSTRUCTIONS
Status: CANCELLED | OUTPATIENT
Start: 2020-02-10

## 2020-02-10 NOTE — PATIENT INSTRUCTIONS
Surgery Instructions    Always follow your surgeon s instructions. If you don t, your surgery could be cancelled. Please use the following checklist.  Your surgery is on: The surgery scheduler will contact you within 1 week of your consult with the surgeon. If you do not hear from them, please call the clinic or RN Care Coordinator for your provider.    Time: Prearrival times can vary depending on location/type of surgery.  [place] - 2 hour pre-arrival  [facility] - 2 hour pre-arrival  [place] - 1 hour pre-arrival    Note:  These times may change. A nurse will call you before surgery to confirm. If you have not received a call or if you have more questions, please call us on the working day before your surgery:  ? [place]: [phone] or [phone] (9am to 5:30pm)  ? [facility]: [phone] (8am to 6pm)  ? [place]: [phone] (9am to 5pm)  ? [facility] [phone] (7am to 4pm)  Prior to surgery  ? Have a pre-op physical exam with your Primary Doctor within 30 days of surgery  - Ask your doctor to send all of your results to the surgery center/hospital before surgery. Your doctor also may ask you to bring the results with you on the day of surgery.  - Tell your doctor if:  - You are allergic to latex or rubber (latex and rubber gloves are often used in medical care).  - You are taking any medicines (including aspirin), vitamins, or herbal products. You may need to stop taking some medicines before surgery.  - You have any medical problems (allergies, diabetes, or heart disease, for example).  - You have a pacemaker or an AICD (automatic implanted cardiac defibrillator). If you do, please bring the ID card with you on the day of surgery.  - People who smoke have a higher risk of infection after surgery. Ask your doctor how you can quit smoking.  - If you Primary Doctor is not within the [employer] system, you will need to have your pre-op physical faxed to us to be scanned into your  chart.  - [place]: [phone] or [phone]  - [facility] ([place]): [phone]  - [facility] [phone]  ? Call your insurance company. Ask if you need pre-approval for your surgery. If you do not have insurance, please let us know. If you wish to speak to the , please alert the clinic staff so this can be arranged.  ? Arrange for someone to drive you home after surgery.  will need to be a responsible adult (18 years or older) that will provide transportation to and from surgery and stay in the waiting room during your surgery. You may not drive yourself or take public transportation to and from surgery.  ? Arrange for someone to stay with you for 24 hours after you go home. This person must be a responsible adult (18 years or older).  ? Call your surgeon or their nurse if there is any change in your health (cold, flu, infections, hospitalizations).  ? Do not smoke, drink alcohol, or take over-the-counter medicine for 24 hours before and after surgery.  ? If you take prescribed drugs, you may need to stop them until after the surgery.  Discuss what medications to take or not take prior to surgery with your Primary Doctor at your pre-op physical. Avoid over-the-counter blood-thinning medications such as Aspirin, Ibuprofen, vitamin E, or fish oil 7 days prior to surgery (unless otherwise directed by your Primary Doctor). Tylenol is a good alternative for mild pain relief prior to surgery.  ? Eating and drinking guidelines prior to surgery:  - Stop all solid food consumption 8 hours prior to surgery  - You may drink clear liquids up to 2 hours prior to surgery (water, fruit juices without pulp, jello, tea/coffee without creamer, sports drinks, clear-fat free broth (bouillon or consomme), popsicles (without milk, bits of fruit, or seeds/nuts)  ? Follow instructions given for showering or bathing before surgery.    - Use 8 ounces of antiseptic surgical soap,  like:  - Hibiclens, Scrub Care, or Exidine  - You can find it at your local pharmacy, clinic, or retail store. If you have trouble, ask your pharmacist to help you find the right substitute.  - Please wash with one of the above soaps twice before coming to the hospital for your surgery. This will decrease bacteria (germs) on your skin. It will also help reduce your chance of infection after surgery.  - Items you will need for showering:  - 4 newly washed washcloths  - 2 newly washed towels  - 8 ounces of one of the above soaps  - Following these instructions:  - The evening before surgery: Shower or bathe as you normally would, using your regular soap and a clean washcloth. Give special attention to places where your incision (surgical cut) or catheters will be. This includes your groin area. Rinse well. You may wash your hair with your regular shampoo. Next, wash your body with 4 ounces of the antiseptic soap. Use a clean, damp washcloth and gently clean your body (from the chin down). If your surgery involves your head, use the special soap on your head and scalp. Rinse well and dry off using a newly washed towel.  - The morning of surgery: Repeat the same process as the evening shower.  - Other suggestions:    Do not shave within 12 inches of your incision (surgical cut) area for at least 3 days before surgery. Shaving can make small cuts in the skin. This puts you at higher risk of infection.    Wear freshly washed pajamas or clothing after your evening shower.    Wear freshly washed clothes the day of surgery.    Wash and change your bed sheets the day before surgery to have clean bed sheets after your shower and when you get home from surgery.    If you have trouble washing all areas, make sure someone helps you.    Don't use any deodorant, lotion or powder after your shower.    Women who are menstruating should wear a fresh sanitary pad to the hospital.  ? Do not wear or add deodorant, cologne, lotion,  makeup, nail polish or jewelry to surgery. If you wear fake nails, please remove at least one nail before coming to surgery (an oxygen monitor needs to be placed on your finger during surgery).  ? Bring these items to the surgery center/hospital:  - Insurance card  - Money for parking and co-pays, if needed  - A list of all the medicines you take. Include vitamins, minerals, herbs, and over-the-counter drugs.  Note any drug allergies.  - A copy of your advance health care directive, if you have one. This tells us what treatment you would want--and who would make health care decisions--if you could no longer speak for yourself. You may request this form in advance or download it from [email or web address].  - A case for glasses, contact lenses, hearing aids, or dentures.  - Your inhaler or CPAP machine, if you use these at home.  ? Leave extra cash, jewelry, and other valuables at home.  When you arrive  When you get to the surgery center/hospital, you will:  ? Check in. If you are under age 18, you must be with a parent or legal guardian.  ? Sign consent forms, if you haven t already. These forms state that you know the risks and benefits of surgery. When you sign the forms, you give us permission to do the surgery. Do not sign them unless you understand what will happen during and after your surgery. If you have any questions about your surgery, ask to speak with your doctor before you sign the forms. If you don t understand the answers, ask again.  ? Receive a copy of the Patient s Bill of Rights. If you do not receive a copy, please ask for one.  ? Change into hospital clothes. Your belongings will be placed in a bag. We will return them to you after surgery.  ? Meet with the anesthesia provider. He or she will tell you what kind of anesthesia (medicine) will be used to keep you comfortable during surgery.  Remember: it s okay to remind doctors and nurses to wash their hands before touching you.  In most  cases, your surgeon will use a marker to write his or her initials on the surgery site. This ensures that the exact site is operated on.  For safety reasons, we will ask you the same questions many times. For example, we may ask your name and birth date over and over again.  Friends and family can stay with you until it s time for surgery. While you re in surgery, they will be in the waiting area. Please note that cell phones are not allowed in some patient care areas.  If you have questions about what will happen in the operating room, talk to your care team.  After surgery  We will move you to a recovery room, where we will watch you closely. If you have any pain or discomfort, tell your nurse. He or she will try to make you comfortable.  If you are staying overnight, we will move you to your hospital room after you are awake.  If you are going home, we will move you to another room. Friends and family may be able to join you. The length of time you spend in recovery depend on the type of medicine you received, your medical condition, the type of surgery you had, or your response to the anesthesia given during your procedure.  When you are discharged from the recovery room, the nurses will review instructions with you and your caregiver.  ? Please wash your hands every time you touch the wound or change bandages or dressings.  ? Do not submerge the wound in water.  You may not use a bathtub or hot tub until the wound is closed. The wait time frame is generally 2-3 weeks, but any open area can be a source of incoming bacteria, so it is better to be on the safe side and avoid water submersion until your wound is fully healed.  ? You may take a shower 24 hours after surgery. Double check with your surgeon if it is OK for water to run over the wound, whether it has been sutured, stapled, glued, or is open. You may gently wash the wound using the antiseptic soap provided for your pre-surgery showering (do not use a  washcloth). Any mild soap will work as well.  ? Many surgical wounds will have small white strips of tape on them called steri-strips.  Do not remove these. The edges will curl and fall off within 7-10 days with normal showering.  ? If you are going home with sutures (stitches) or staples, you must return to the clinic to have them taken out, usually within 1-2 weeks. Some stitches are dissolvable and do not require removal. Make sure to clarify with your surgeon or surgery nurse reviewing discharge paperwork what kind of sutures you have.  ? Signs and symptoms of infection include:  - Fever, temperature over 101.5   F  - Redness  - Swelling  - Increased pain  - Green or yellow drainage which may or may not have a foul odor  Dealing with pain  A nurse will check your comfort level often during your stay. He or she will work with you to manage your pain.  Remember:  ? All pain is real. There are many ways to control pain. We can help you decide what works best for you.  ? Ask for pain medicine when you need it. Don t try to  tough it out --this can make you feel worse. Always take your medicine as ordered.  ? Medicine doesn t work the same for everyone. If your medicine isn t working, tell your nurse. There may be other medicines or treatments we can try.  Going home  We will let you know when you re ready to leave the surgery center or hospital. Before you leave, we will tell you how to care for yourself at home and prevent infections. If you do not understand something, please say so. We will answer any questions you have. We will then help you get ready to leave.  Remember, you must have a responsible adult (18 years or older) to stay with you 24 hours after you leave the hospital.  Take it easy when you get home. You will need some time to recover--you may be more tired than you realize at first. Rest and relax for at least the first 24 hours at home. You ll feel better and heal faster if you take good care of  "yourself.  Follow the discharge instructions that are given to you when you leave the surgery center or hospital  Please call the clinic if you experience any problems during regular clinic hours (Monday-Friday 8:00am-5:00pm).  If you experience problems during non-clinic hours, please call the [facility] on-call line at [phone] and ask the  to page the on-call Provider for your specialty. The on-call Provider will call you back and can triage your symptoms and further advise. If you are having an emergency, always call 911 or seek immediate evaluation at the Emergency Room.  Locations  [facility]  Same-day surgery center - 2nd floor, check-in #1 [address] [place] [address]  [phone]  [email or web address]    AFTER YOUR CYSTOSCOPY        You have just completed a cystoscopy, or \"cysto\", which allowed your physician to learn more about your bladder (or to remove a stent placed after surgery). We suggest that you continue to avoid caffeine, fruit juice, and alcohol for the next 24 hours, however, you are encouraged to return to your normal activities.         A few things that are considered normal after your cystoscopy:     * Small amount of bleeding (or spotting) that clears within the next 24 hours     * Slight burning sensation with urination     * Sensation to of needing to avoid more frequently     * The feeling of \"air\" in your urine     * Mild discomfort that is relieved with Tylenol        Please contact our office promptly if you:     * Develop a fever above 101 degrees     * Are unable to urinate     * Develop bright red blood that does not stop     * Severe pain or swelling         Please contact our office with any concerns or questions @[facility].  "

## 2020-02-10 NOTE — NURSING NOTE
Cristobal Darnell's goals for this visit include:   Chief Complaint   Patient presents with     Cystoscopy     microscopic hematuria       She requests these members of her care team be copied on today's visit information:     PCP: Shania Yeung    Referring Provider:  No referring provider defined for this encounter.    /74 (BP Location: Right arm, Patient Position: Sitting, Cuff Size: Adult Regular)   Pulse 73   SpO2 98%     Do you need any medication refills at today's visit? No    Tracie Rose LPN

## 2020-02-10 NOTE — PROGRESS NOTES
Reason for Visit:  Cystoscopy    Clinical Data: Ms. Cristobal Darnell is a 44 year old female with a hx of microhematuria and stress urinary incontinence.  She has tried kegel exercises in the past which have not helped.    Urine cytology 1/3/20:  NEM    CT urogram 1/7/20:  No findings to explain the patient's hematuria.     Cystoscopy procedure:  Pt. Was consented and placed in the lithotomy position.  She was cleaned and preparred in the usual fashion.  Lidocain gel was inserted into the urethra and given time to take effect.  A 16 fr flexible cystoscope was then inserted through the urethra and into the bladder.  The urethra was wnl.  The bladder was with 1+ trabeculation.  No tumors, diverticulae, or stones.  Bilateral u/o's were effluxing clear urine.  The cystoscope was then withdrawn.  The pt. Tolerated the procedure well.    A/P:  44 year old female with microhematuria and negative w/u.  She also complains of stress urinary incontinence.  We discussed options of observation, PT, and midurethral sling.  We discussed the use of mesh in surgery and the risks which include but are not limited to infection, bleeding, exposure of mesh, vaginal pain, injury to the bladder/urethra/rectum or any structures in the abdomen or pelvis, as well as risk of incontinence or urinary retention. There is also risk of need for catheterization and possible further surgery.   We discussed the FDA public health notification at length.  The pt. Verbalized understanding and had a chance to ask her questions which were all answered and the patient would like to proceed with a midurethral sling.  -schedule midurethal sling and cystoscopy.    Thank you for allowing me to participate in the care of  Ms. Cristobal Darnell and I will keep you updated on her progress.    Wero Roca MD

## 2020-02-11 ENCOUNTER — HOSPITAL ENCOUNTER (OUTPATIENT)
Facility: AMBULATORY SURGERY CENTER | Age: 45
End: 2020-02-11
Attending: UROLOGY | Admitting: UROLOGY
Payer: COMMERCIAL

## 2020-02-11 DIAGNOSIS — N39.3 STRESS INCONTINENCE: ICD-10-CM

## 2020-02-24 DIAGNOSIS — E53.8 VITAMIN B 12 DEFICIENCY: ICD-10-CM

## 2020-02-24 DIAGNOSIS — F32.1 MAJOR DEPRESSIVE DISORDER, SINGLE EPISODE, MODERATE DEGREE (H): ICD-10-CM

## 2020-02-25 RX ORDER — LANOLIN ALCOHOL/MO/W.PET/CERES
CREAM (GRAM) TOPICAL
Qty: 90 TABLET | Refills: 1 | Status: SHIPPED | OUTPATIENT
Start: 2020-02-25 | End: 2020-10-28

## 2020-02-25 NOTE — TELEPHONE ENCOUNTER
Called and left patient VM to see what does she is taking  Henry Pittman CMA on 2/25/2020 at 9:15 AM

## 2020-02-25 NOTE — TELEPHONE ENCOUNTER
"Routing refill request to provider for review/approval because:  Labs out of range:  PHQ-9  Does have appointment on 3/17/2020    Requested Prescriptions   Pending Prescriptions Disp Refills     sertraline (ZOLOFT) 25 MG tablet [Pharmacy Med Name: SERTRALINE 25MG TABLETS] 30 tablet 0     Sig: TAKE 1 TABLET BY MOUTH DAILY       SSRIs Protocol Failed - 2/24/2020  7:28 AM        Failed - PHQ-9 score less than 5 in past 6 months     Please review last PHQ-9 score.           Passed - Medication is active on med list        Passed - Patient is age 18 or older        Passed - No active pregnancy on record        Passed - No positive pregnancy test in last 12 months        Passed - Recent (6 mo) or future (30 days) visit within the authorizing provider's specialty     Patient had office visit in the last 6 months or has a visit in the next 30 days with authorizing provider or within the authorizing provider's specialty.  See \"Patient Info\" tab in inbasket, or \"Choose Columns\" in Meds & Orders section of the refill encounter.          Signed Prescriptions Disp Refills    cyanocobalamin (VITAMIN B-12) 1000 MCG tablet 90 tablet 1     Sig: TAKE ONE TABLET BY MOUTH DAILY       Vitamin Supplements (Adult) Protocol Passed - 2/24/2020  7:28 AM        Passed - High dose Vitamin D not ordered        Passed - Recent (12 mo) or future (30 days) visit within the authorizing provider's specialty     Patient has had an office visit with the authorizing provider or a provider within the authorizing providers department within the previous 12 mos or has a future within next 30 days. See \"Patient Info\" tab in inbasket, or \"Choose Columns\" in Meds & Orders section of the refill encounter.              Passed - Medication is active on med list        Edilma Harmon RN  "

## 2020-02-26 RX ORDER — SERTRALINE HYDROCHLORIDE 25 MG/1
75 TABLET, FILM COATED ORAL DAILY
Qty: 90 TABLET | Refills: 0 | Status: SHIPPED | OUTPATIENT
Start: 2020-02-26 | End: 2020-03-03

## 2020-02-27 ENCOUNTER — TELEPHONE (OUTPATIENT)
Dept: FAMILY MEDICINE | Facility: CLINIC | Age: 45
End: 2020-02-27

## 2020-02-27 DIAGNOSIS — F32.1 MAJOR DEPRESSIVE DISORDER, SINGLE EPISODE, MODERATE DEGREE (H): Primary | ICD-10-CM

## 2020-02-29 DIAGNOSIS — E63.9 DIETARY DEFICIENCY: ICD-10-CM

## 2020-03-02 NOTE — TELEPHONE ENCOUNTER
Central Prior Authorization Team  Phone: [phone]    PA Initiation    Medication: sertraline (ZOLOFT) 25 MG tablet  Insurance Company: [employer] - Phone [phone] Fax [phone]  Pharmacy Filling the Rx: [id number] - [address]  Filling Pharmacy Phone: [phone]  Filling Pharmacy Fax:    Start Date: 3/2/2020

## 2020-03-03 RX ORDER — CHOLECALCIFEROL (VITAMIN D3) 25 MCG
TABLET ORAL
Qty: 90 TABLET | Refills: 0 | Status: SHIPPED | OUTPATIENT
Start: 2020-03-03 | End: 2020-12-29

## 2020-03-03 NOTE — TELEPHONE ENCOUNTER
Signed Prescriptions:                        Disp   Refills    sertraline (ZOLOFT) 50 MG tablet           45 tab*2        Sig: Take 1.5 tablets (75 mg) by mouth daily  Authorizing Provider: MASSIMO GIPSON

## 2020-03-03 NOTE — TELEPHONE ENCOUNTER
"Prescription approved per [facility] Refill Protocol.    Requested Prescriptions   Pending Prescriptions Disp Refills     VITAMIN D3 25 MCG (1000 UT) tablet [Pharmacy Med Name: VITAMIN D 1,000UNIT TABLETS] 90 tablet 0     Sig: TAKE 1 TABLET BY MOUTH DAILY       Vitamin Supplements (Adult) Protocol Passed - 3/2/2020  8:35 AM        Passed - High dose Vitamin D not ordered        Passed - Recent (12 mo) or future (30 days) visit within the authorizing provider's specialty     Patient has had an office visit with the authorizing provider or a provider within the authorizing providers department within the previous 12 mos or has a future within next 30 days. See \"Patient Info\" tab in inbasket, or \"Choose Columns\" in Meds & Orders section of the refill encounter.              Passed - Medication is active on med list        "

## 2020-03-03 NOTE — TELEPHONE ENCOUNTER
PRIOR AUTHORIZATION DENIED    Medication: sertraline (ZOLOFT) 25 MG tablet- DENIED     Denial Date: 3/3/2020    Denial Rational: Insurance allows a maximum quantity of 34 tablets per fill. Additional quantity is not allowed.      Appeal Information: If provider would like to appeal please provide a letter of medical necessity.

## 2020-03-03 NOTE — TELEPHONE ENCOUNTER
PA Denied would you like to do an Appeal,Change Medication or patient pay out of pocket.  Veronika Lau,

## 2020-03-04 DIAGNOSIS — E66.9 OBESITY: ICD-10-CM

## 2020-03-05 RX ORDER — TOPIRAMATE 25 MG/1
TABLET, FILM COATED ORAL
Qty: 180 TABLET | OUTPATIENT
Start: 2020-03-05

## 2020-03-05 NOTE — TELEPHONE ENCOUNTER
TOPIRAMATE 25MG TABLETS     Last Written Prescription Date:  9/30/2019  Last Fill Quantity: 180,   # refills: 0  Last Office Visit : 3/27/2018  Future Office visit:  None    Routing refill request to provider for review/approval because:  Over due office visit & labs.  Last office visit almost 2 years ago??  Refer to clinic for review and refills.    Ruthie Rey RN  Central Triage Red Flags/Med Refills

## 2020-03-20 PROBLEM — M54.50 CHRONIC LEFT-SIDED LOW BACK PAIN WITHOUT SCIATICA: Status: RESOLVED | Noted: 2019-12-16 | Resolved: 2020-03-20

## 2020-03-20 PROBLEM — G89.29 CHRONIC LEFT-SIDED LOW BACK PAIN WITHOUT SCIATICA: Status: RESOLVED | Noted: 2019-12-16 | Resolved: 2020-03-20

## 2020-03-20 NOTE — PROGRESS NOTES
"Discharge Note    Progress reporting period is from initial evaluation date 12/16/19 to 1/16/2020      Cristobal failed to follow up and current status is unknown.    Patient was seen for 3 visits.    SUBJECTIVE  At last scheduled visit, patient stated she was  \"no better.\"  Knee to chest exercise apparently made her \"sore all over.\"      Current pain level is 7/10.     Previous pain level was 7/10  .   Changes in function:  None  Adverse reaction to treatment or activity: None    OBJECTIVE  Objective status was essentially unchanged.  Patient exhibited poor TrAbdominis strenth (2-/5) and decreased endurance in core musculature.     ASSESSMENT/PLAN  Diagnosis: LBP   STG/LTGs have been met or progress has been made towards goals:  No  Assessment of Progress: patient was progressing slower than expected   Self Management Plans:  Patient has been instructed in a home exercise program.  I have also discussed with patient, at great length, the importance of physical activity and exercise in managing her LBP; that hurt does not= harm, and that general soreness is to be expected.  Also educated patient re: chronic pain and the need for strengthening and increased activity level.  Patient states she has a [employer] membership but does not go.  Discussed getting some sort of cardiovascular exercise such as walking/biking at least 3 days a week to start, and then increasing this to 30 min x 5 days/week.    Cristobal continues to require the following intervention to meet STG and LTG's:  HEP.    Recommendations:  Following last visit on 1/16/2020, patient failed to schedule any additional appointments.  No further information on patient's status is known. Will consequently discharge from physical therapy.    Please refer to the daily flowsheet for treatment today, total treatment time and time spent performing 1:1 timed codes.    "

## 2020-04-07 DIAGNOSIS — E66.9 OBESITY: ICD-10-CM

## 2020-04-07 RX ORDER — TOPIRAMATE 25 MG/1
75 TABLET, FILM COATED ORAL AT BEDTIME
Qty: 180 TABLET | OUTPATIENT
Start: 2020-04-07

## 2020-04-07 NOTE — TELEPHONE ENCOUNTER
TOPIRAMATE 25MG TABLETS    Last Written Prescription Date:  9/30/2019  Last Fill Quantity: 180,   # refills: 0  Last Office Visit : 3/27/2018  Future Office visit:  None    Routing refill request to provider for review/approval because:  Last office visit Mar 2018??   Refer to Provider for review and refills    Ruthie Rey RN  Central Triage Red Flags/Med Refills

## 2020-04-24 DIAGNOSIS — E66.9 OBESITY: ICD-10-CM

## 2020-04-24 NOTE — TELEPHONE ENCOUNTER
"Routing refill request to provider for review/approval because:  Labs not current:  ALT/AST    Requested Prescriptions   Pending Prescriptions Disp Refills    topiramate (TOPAMAX) 25 MG tablet [Pharmacy Med Name: TOPIRAMATE 25MG TABLETS] 180 tablet 0     Sig: TAKE 3 TABLETS BY MOUTH EVERY NIGHT AT BEDTIME       Anti-Seizure Meds Protocol  Failed - 4/24/2020 11:53 AM        Failed - Review Authorizing provider's last note.      Refer to last progress notes: confirm request is for original authorizing provider (cannot be through other providers).          Failed - Normal ALT or AST on file in past 26 months     Recent Labs   Lab Test 05/22/12  1553   ALT 14     Recent Labs   Lab Test 05/22/12  1553   AST 29             Passed - Recent (12 mo) or future (30 days) visit within the authorizing provider's specialty     Patient has had an office visit with the authorizing provider or a provider within the authorizing providers department within the previous 12 mos or has a future within next 30 days. See \"Patient Info\" tab in inbasket, or \"Choose Columns\" in Meds & Orders section of the refill encounter.              Passed - Normal CBC on file in past 26 months     Recent Labs   Lab Test 08/14/19  1447   WBC 9.1   RBC 4.49   HGB 12.2   HCT 36.1                    Passed - Normal platelet count on file in past 26 months     Recent Labs   Lab Test 08/14/19  1447                  Passed - Medication is active on med list        Passed - No active pregnancy on record        Passed - No positive pregnancy test in last 12 months           Edilma Harmon RN  "

## 2020-04-27 ENCOUNTER — TELEPHONE (OUTPATIENT)
Dept: ENDOCRINOLOGY | Facility: CLINIC | Age: 45
End: 2020-04-27

## 2020-04-27 RX ORDER — TOPIRAMATE 25 MG/1
75 TABLET, FILM COATED ORAL DAILY
Qty: 90 TABLET | Refills: 0 | Status: SHIPPED | OUTPATIENT
Start: 2020-04-27 | End: 2020-07-28

## 2020-05-05 ENCOUNTER — VIRTUAL VISIT (OUTPATIENT)
Dept: ENDOCRINOLOGY | Facility: CLINIC | Age: 45
End: 2020-05-05
Payer: COMMERCIAL

## 2020-05-05 VITALS — WEIGHT: 165 LBS | HEIGHT: 60 IN | BODY MASS INDEX: 32.39 KG/M2

## 2020-05-05 DIAGNOSIS — Z53.9 NO SHOW: Primary | ICD-10-CM

## 2020-05-05 ASSESSMENT — PAIN SCALES - GENERAL: PAINLEVEL: NO PAIN (0)

## 2020-05-05 ASSESSMENT — MIFFLIN-ST. JEOR: SCORE: 1309.94

## 2020-05-05 NOTE — NURSING NOTE
(   Chief Complaint   Patient presents with     RECHECK     Follow up from 03/27/18.    )    ( Weight: 74.8 kg (165 lb)(Pt reported) )  ( Height: 152.4 cm (5')(Pt reported) )  ( BMI (Calculated): 32.22 )  (   )  ( Cumulative weight loss (lbs): 12.2 )  ( Last Visits Weight: 80.4 kg (177 lb 3.2 oz)(03/27/18) )  ( Wt change since last visit (lbs): -12.2 )  (   )  (   )    (   )  (   )  (   )  (   )  (   )  (   )  (   )    (   Patient Active Problem List   Diagnosis     Vitamin B 12 deficiency     CARDIOVASCULAR SCREENING; LDL GOAL LESS THAN 160     Obesity     Major depressive disorder, single episode, moderate degree (H)     Chronic low back pain     CTS (carpal tunnel syndrome) - bilateral     Elevated blood uric acid level     Migraines     Pain in thoracic spine     GERD (gastroesophageal reflux disease)     Intertrigo     Female stress incontinence     TMJ (temporomandibular joint disorder)     Cervicalgia     Myofascial muscle pain     Patellofemoral disorder, unspecified laterality     Chronic right shoulder pain     Stress incontinence    )  (   Current Outpatient Medications   Medication Sig Dispense Refill     cyanocobalamin (VITAMIN B-12) 1000 MCG tablet TAKE ONE TABLET BY MOUTH DAILY 90 tablet 1     fish oil-omega-3 fatty acids 1000 MG capsule TAKE ONE CAPSULE BY MOUTH ONCE DAILY 90 each 3     MAPAP ARTHRITIS PAIN 650 MG CR tablet TAKE 1 TABLET BY MOUTH EVERY 8 HOURS AS NEEDED FOR PAIN 60 tablet 0     Multiple Vitamin (DAILY-CHRISTIAN) TABS Take 1 tablet by mouth daily 90 tablet 3     naproxen (NAPROSYN) 500 MG tablet Take 1 tablet (500 mg) by mouth 2 times daily as needed for moderate pain 60 tablet 11     sertraline (ZOLOFT) 50 MG tablet Take 1.5 tablets (75 mg) by mouth daily 45 tablet 2     topiramate (TOPAMAX) 25 MG tablet Take 3 tablets (75 mg) by mouth daily 90 tablet 0     VITAMIN D3 25 MCG (1000 UT) tablet TAKE 1 TABLET BY MOUTH DAILY 90 tablet 0    )  ( Diabetes Eval:    )    ( Pain Eval:  No Pain  (0) )    ( Wound Eval:       )    (   History   Smoking Status     Never Smoker   Smokeless Tobacco     Never Used    )    ( Signed By:  Racheal Meza CMA; May 5, 2020; 10:31 AM )

## 2020-05-05 NOTE — PROGRESS NOTES
"Cristobal Darnell is a 45 year old female who is being evaluated via a billable video visit.      The patient has been notified of following:     \"This video visit will be conducted via a call between you and your physician/provider. We have found that certain health care needs can be provided without the need for an in-person physical exam.  This service lets us provide the care you need with a video conversation.  If a prescription is necessary we can send it directly to your pharmacy.  If lab work is needed we can place an order for that and you can then stop by our lab to have the test done at a later time.    Video visits are billed at different rates depending on your insurance coverage.  Please reach out to your insurance provider with any questions.    If during the course of the call the physician/provider feels a video visit is not appropriate, you will not be charged for this service.\"    Patient has given verbal consent for Video visit? Yes    How would you like to obtain your AVS? E-Mail (inform patient AVS not encrypted)    Patient would like the video invitation sent by: Text to cell phone: [phone]    Will anyone else be joining your video visit? No            Patient is no show at the appointment    Reyna Dawson MD  Division of Diabetes and Endocrinology  Department of Medicine      This patient was a no show for this scheduled appointment.  "

## 2020-06-15 ENCOUNTER — OFFICE VISIT (OUTPATIENT)
Dept: ORTHOPEDICS | Facility: CLINIC | Age: 45
End: 2020-06-15
Payer: COMMERCIAL

## 2020-06-15 VITALS
BODY MASS INDEX: 32.39 KG/M2 | OXYGEN SATURATION: 98 % | WEIGHT: 165 LBS | HEIGHT: 60 IN | SYSTOLIC BLOOD PRESSURE: 110 MMHG | DIASTOLIC BLOOD PRESSURE: 73 MMHG | HEART RATE: 83 BPM

## 2020-06-15 DIAGNOSIS — G56.03 CARPAL TUNNEL SYNDROME, BILATERAL: Primary | ICD-10-CM

## 2020-06-15 PROCEDURE — 20526 THER INJECTION CARP TUNNEL: CPT | Mod: 50 | Performed by: ORTHOPAEDIC SURGERY

## 2020-06-15 PROCEDURE — 99213 OFFICE O/P EST LOW 20 MIN: CPT | Mod: 25 | Performed by: ORTHOPAEDIC SURGERY

## 2020-06-15 RX ORDER — LIDOCAINE HYDROCHLORIDE 10 MG/ML
1 INJECTION, SOLUTION INFILTRATION; PERINEURAL
Status: DISCONTINUED | OUTPATIENT
Start: 2020-06-15 | End: 2020-08-21

## 2020-06-15 RX ORDER — TRIAMCINOLONE ACETONIDE 40 MG/ML
40 INJECTION, SUSPENSION INTRA-ARTICULAR; INTRAMUSCULAR
Status: DISCONTINUED | OUTPATIENT
Start: 2020-06-15 | End: 2020-08-21

## 2020-06-15 RX ADMIN — LIDOCAINE HYDROCHLORIDE 1 ML: 10 INJECTION, SOLUTION INFILTRATION; PERINEURAL at 16:09

## 2020-06-15 RX ADMIN — TRIAMCINOLONE ACETONIDE 40 MG: 40 INJECTION, SUSPENSION INTRA-ARTICULAR; INTRAMUSCULAR at 16:09

## 2020-06-15 ASSESSMENT — MIFFLIN-ST. JEOR: SCORE: 1309.94

## 2020-06-15 ASSESSMENT — PAIN SCALES - GENERAL: PAINLEVEL: SEVERE PAIN (6)
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
## 2020-06-22 DIAGNOSIS — F32.1 MAJOR DEPRESSIVE DISORDER, SINGLE EPISODE, MODERATE DEGREE (H): ICD-10-CM

## 2020-06-23 NOTE — TELEPHONE ENCOUNTER
Called and left patient VM to return call to complete PHQ9  Henry Pittman CMA on 6/23/2020 at 7:59 AM

## 2020-06-24 ASSESSMENT — PATIENT HEALTH QUESTIONNAIRE - PHQ9: SUM OF ALL RESPONSES TO PHQ QUESTIONS 1-9: 10

## 2020-06-24 NOTE — TELEPHONE ENCOUNTER
Patient completed PHQ9. MAILE Guerra    PHQ 6/24/2020   PHQ-9 Total Score 10   Q9: Thoughts of better off dead/self-harm past 2 weeks Not at all

## 2020-06-24 NOTE — TELEPHONE ENCOUNTER
"Routing refill request to provider for review/approval because:  Labs out of range:  PHQ-9    Requested Prescriptions   Pending Prescriptions Disp Refills     sertraline (ZOLOFT) 50 MG tablet [Pharmacy Med Name: SERTRALINE 50MG TABLETS] 90 tablet      Sig: TAKE ONE TABLET BY MOUTH DAILY       SSRIs Protocol Failed - 6/24/2020 10:05 AM        Failed - PHQ-9 score less than 5 in past 6 months     Please review last PHQ-9 score.           Failed - Recent (6 mo) or future (30 days) visit within the authorizing provider's specialty     Patient had office visit in the last 6 months or has a visit in the next 30 days with authorizing provider or within the authorizing provider's specialty.  See \"Patient Info\" tab in inbasket, or \"Choose Columns\" in Meds & Orders section of the refill encounter.            Passed - Medication is active on med list        Passed - Patient is age 18 or older        Passed - No active pregnancy on record        Passed - No positive pregnancy test in last 12 months           "

## 2020-07-24 DIAGNOSIS — F32.1 MAJOR DEPRESSIVE DISORDER, SINGLE EPISODE, MODERATE DEGREE (H): ICD-10-CM

## 2020-07-25 NOTE — TELEPHONE ENCOUNTER
Routing refill request to provider for review/approval because:  PHQ-9 score:    PHQ 6/24/2020   PHQ-9 Total Score 10   Q9: Thoughts of better off dead/self-harm past 2 weeks Not at all

## 2020-07-28 ENCOUNTER — VIRTUAL VISIT (OUTPATIENT)
Dept: FAMILY MEDICINE | Facility: CLINIC | Age: 45
End: 2020-07-28
Payer: COMMERCIAL

## 2020-07-28 DIAGNOSIS — M22.42 CHONDROMALACIA OF BOTH PATELLAE: ICD-10-CM

## 2020-07-28 DIAGNOSIS — E66.812 CLASS 2 OBESITY IN ADULT, UNSPECIFIED BMI, UNSPECIFIED OBESITY TYPE, UNSPECIFIED WHETHER SERIOUS COMORBIDITY PRESENT: ICD-10-CM

## 2020-07-28 DIAGNOSIS — M22.41 CHONDROMALACIA OF BOTH PATELLAE: ICD-10-CM

## 2020-07-28 DIAGNOSIS — F32.1 MAJOR DEPRESSIVE DISORDER, SINGLE EPISODE, MODERATE DEGREE (H): ICD-10-CM

## 2020-07-28 PROCEDURE — 99214 OFFICE O/P EST MOD 30 MIN: CPT | Mod: 95 | Performed by: PHYSICIAN ASSISTANT

## 2020-07-28 PROCEDURE — 96127 BRIEF EMOTIONAL/BEHAV ASSMT: CPT | Performed by: PHYSICIAN ASSISTANT

## 2020-07-28 RX ORDER — TOPIRAMATE 25 MG/1
75 TABLET, FILM COATED ORAL DAILY
Qty: 90 TABLET | Refills: 0 | Status: SHIPPED | OUTPATIENT
Start: 2020-07-28 | End: 2020-08-28

## 2020-07-28 RX ORDER — SENNOSIDES 8.6 MG
650 CAPSULE ORAL EVERY 8 HOURS PRN
Qty: 60 TABLET | Refills: 1 | Status: SHIPPED | OUTPATIENT
Start: 2020-07-28 | End: 2020-09-24

## 2020-07-28 ASSESSMENT — ANXIETY QUESTIONNAIRES
GAD7 TOTAL SCORE: 3
7. FEELING AFRAID AS IF SOMETHING AWFUL MIGHT HAPPEN: SEVERAL DAYS
5. BEING SO RESTLESS THAT IT IS HARD TO SIT STILL: NOT AT ALL
1. FEELING NERVOUS, ANXIOUS, OR ON EDGE: SEVERAL DAYS
3. WORRYING TOO MUCH ABOUT DIFFERENT THINGS: NOT AT ALL
IF YOU CHECKED OFF ANY PROBLEMS ON THIS QUESTIONNAIRE, HOW DIFFICULT HAVE THESE PROBLEMS MADE IT FOR YOU TO DO YOUR WORK, TAKE CARE OF THINGS AT HOME, OR GET ALONG WITH OTHER PEOPLE: SOMEWHAT DIFFICULT
6. BECOMING EASILY ANNOYED OR IRRITABLE: SEVERAL DAYS
2. NOT BEING ABLE TO STOP OR CONTROL WORRYING: NOT AT ALL

## 2020-07-28 ASSESSMENT — PATIENT HEALTH QUESTIONNAIRE - PHQ9
SUM OF ALL RESPONSES TO PHQ QUESTIONS 1-9: 16
5. POOR APPETITE OR OVEREATING: NOT AT ALL

## 2020-07-28 NOTE — PROGRESS NOTES
"Cristobal Darnell is a 45 year old female who is being evaluated via a billable telephone visit.      The patient has been notified of following:     \"This telephone visit will be conducted via a call between you and your physician/provider. We have found that certain health care needs can be provided without the need for a physical exam.  This service lets us provide the care you need with a short phone conversation.  If a prescription is necessary we can send it directly to your pharmacy.  If lab work is needed we can place an order for that and you can then stop by our lab to have the test done at a later time.    Telephone visits are billed at different rates depending on your insurance coverage. During this emergency period, for some insurers they may be billed the same as an in-person visit.  Please reach out to your insurance provider with any questions.    If during the course of the call the physician/provider feels a telephone visit is not appropriate, you will not be charged for this service.\"    Patient has given verbal consent for Telephone visit?  Yes    What phone number would you like to be contacted at? [phone]    How would you like to obtain your AVS? Mail a copy    Subjective     Cristobal Darnell is a 45 year old female who presents via phone visit today for the following health issues:    HPI    Depression and Anxiety Follow-Up    How are you doing with your depression since your last visit?  Slightly Improved     How are you doing with your anxiety since your last visit?  Slightly Improved     Are you having other symptoms that might be associated with depression or anxiety? No    Have you had a significant life event? No     Do you have any concerns with your use of alcohol or other drugs? No    Social History     Tobacco Use     Smoking status: Never Smoker     Smokeless tobacco: Never Used   Substance Use Topics     Alcohol use: No     Alcohol/week: 0.0 standard drinks     Drug use: No "     PHQ 12/9/2019 6/24/2020 7/28/2020   PHQ-9 Total Score 17 10 16   Q9: Thoughts of better off dead/self-harm past 2 weeks Not at all Not at all Not at all     ALPESH-7 SCORE 9/25/2017 1/23/2019 7/28/2020   Total Score - - -   Total Score 18 15 3       Suicide Assessment Five-step Evaluation and Treatment (SAFE-T)      How many servings of fruits and vegetables do you eat daily?  0-1    On average, how many sweetened beverages do you drink each day (Examples: soda, juice, sweet tea, etc.  Do NOT count diet or artificially sweetened beverages)?   3 cups of sweet tea    How many days per week do you exercise enough to make your heart beat faster? 7    How many minutes a day do you exercise enough to make your heart beat faster? 30 - 60    How many days per week do you miss taking your medication? 0    Patient still fairly symptomatic on 50 mg of sertraline. She is amenable to a dose increase but reluctant for 100 mg.  75 mg for now with follow up in 2-4 weeks.  PHQ-9 and ALPESH-7 obtained and reviewed.           Review of Systems   Constitutional, HEENT, cardiovascular, pulmonary, gi and gu systems are negative, except as otherwise noted.           Diagnostic Test Results:  none         Assessment/Plan:    1. Major depressive disorder, single episode, moderate degree (H)  - sertraline (ZOLOFT) 50 MG tablet; Take 1.5 tablets (75 mg) by mouth daily  Dispense: 45 tablet; Refill: 1    2. Class 2 obesity in adult, unspecified BMI, unspecified obesity type, unspecified whether serious comorbidity present  - topiramate (TOPAMAX) 25 MG tablet; Take 3 tablets (75 mg) by mouth daily  Dispense: 90 tablet; Refill: 0    3. Chondromalacia of both patellae  - acetaminophen (MAPAP ARTHRITIS PAIN) 650 MG CR tablet; Take 1 tablet (650 mg) by mouth every 8 hours as needed for mild pain or fever  Dispense: 60 tablet; Refill: 1    Return in about 4 weeks (around 8/25/2020) for Phone Visit.      Phone call duration:  10 minutes    Monica  Delaney Rios PA-C

## 2020-07-29 ASSESSMENT — ANXIETY QUESTIONNAIRES: GAD7 TOTAL SCORE: 3

## 2020-08-21 ENCOUNTER — OFFICE VISIT (OUTPATIENT)
Dept: FAMILY MEDICINE | Facility: CLINIC | Age: 45
End: 2020-08-21
Payer: COMMERCIAL

## 2020-08-21 VITALS
DIASTOLIC BLOOD PRESSURE: 72 MMHG | TEMPERATURE: 98.7 F | BODY MASS INDEX: 32.93 KG/M2 | SYSTOLIC BLOOD PRESSURE: 113 MMHG | WEIGHT: 168.6 LBS | HEART RATE: 63 BPM

## 2020-08-21 DIAGNOSIS — B96.89 BV (BACTERIAL VAGINOSIS): ICD-10-CM

## 2020-08-21 DIAGNOSIS — N76.0 BV (BACTERIAL VAGINOSIS): ICD-10-CM

## 2020-08-21 DIAGNOSIS — R30.0 DYSURIA: Primary | ICD-10-CM

## 2020-08-21 LAB
ALBUMIN UR-MCNC: NEGATIVE MG/DL
APPEARANCE UR: CLEAR
BILIRUB UR QL STRIP: NEGATIVE
COLOR UR AUTO: YELLOW
GLUCOSE UR STRIP-MCNC: NEGATIVE MG/DL
HGB UR QL STRIP: ABNORMAL
KETONES UR STRIP-MCNC: NEGATIVE MG/DL
LEUKOCYTE ESTERASE UR QL STRIP: NEGATIVE
NITRATE UR QL: NEGATIVE
NON-SQ EPI CELLS #/AREA URNS LPF: NORMAL /LPF
PH UR STRIP: 7 PH (ref 5–7)
RBC #/AREA URNS AUTO: NORMAL /HPF
SOURCE: ABNORMAL
SP GR UR STRIP: 1.01 (ref 1–1.03)
SPECIMEN SOURCE: ABNORMAL
UROBILINOGEN UR STRIP-ACNC: 0.2 EU/DL (ref 0.2–1)
WBC #/AREA URNS AUTO: NORMAL /HPF
WET PREP SPEC: ABNORMAL

## 2020-08-21 PROCEDURE — 81001 URINALYSIS AUTO W/SCOPE: CPT | Performed by: NURSE PRACTITIONER

## 2020-08-21 PROCEDURE — 87210 SMEAR WET MOUNT SALINE/INK: CPT | Performed by: NURSE PRACTITIONER

## 2020-08-21 PROCEDURE — 99213 OFFICE O/P EST LOW 20 MIN: CPT | Performed by: NURSE PRACTITIONER

## 2020-08-21 RX ORDER — METRONIDAZOLE 500 MG/1
500 TABLET ORAL 2 TIMES DAILY
Qty: 14 TABLET | Refills: 0 | Status: SHIPPED | OUTPATIENT
Start: 2020-08-21 | End: 2020-08-28

## 2020-08-21 ASSESSMENT — PAIN SCALES - GENERAL: PAINLEVEL: NO PAIN (0)

## 2020-08-21 ASSESSMENT — ENCOUNTER SYMPTOMS
FATIGUE: 0
ABDOMINAL PAIN: 0
RHINORRHEA: 0
NAUSEA: 0
SORE THROAT: 0
FEVER: 0
DIZZINESS: 0
HEADACHES: 0
LIGHT-HEADEDNESS: 0
SINUS PRESSURE: 0
CONSTIPATION: 0
WHEEZING: 0
EYE ITCHING: 0
CHILLS: 0
DIAPHORESIS: 0
COUGH: 0
EYE DISCHARGE: 0
SHORTNESS OF BREATH: 0
DIARRHEA: 0

## 2020-08-21 NOTE — PROGRESS NOTES
Subjective     Cristobal Darnell is a 45 year old female who presents to clinic today for the following health issues:    HPI     Genitourinary - Female  Onset/Duration: 1 1/2 weeks ago  Description:   Painful urination (Dysuria): no           Frequency: no  Blood in urine (Hematuria): urine has had an orange coloration  Delay in urine (Hesitency): no  Intensity: moderate  Progression of Symptoms:  same  Accompanying Signs & Symptoms:  Fever/chills: no  Flank pain: YES, stabbing left sided flank pain. Intermittent. Did feel weak and shaky on 2 occasions that lasted about 15 minutes each. Wonders if this is more related to her topiramate.  Nausea and vomiting: no  Vaginal symptoms: none  Abdominal/Pelvic Pain: no  History:   History of frequent UTI s: no  History of kidney stones: no  Sexually Active: no  Possibility of pregnancy: No  Precipitating or alleviating factors: None  Therapies tried and outcome:  None       About 1.5 weeks ago started to notice that urine was a different color. No odor to urine. No fevers or chills. Is having pain to left back. No nausea. Did have a couple episodes where felt a biting in stomach. This occurred before the urinary issues. No change in the color stools. The pain that was there lasted for about 15 minutes. Did feels some weak and tired with it. No new medications.     Review of Systems   Constitutional: Negative for chills, diaphoresis, fatigue and fever.   HENT: Negative for ear discharge, ear pain, hearing loss, rhinorrhea, sinus pressure and sore throat.    Eyes: Negative for discharge and itching.   Respiratory: Negative for cough, shortness of breath and wheezing.    Gastrointestinal: Negative for abdominal pain, constipation, diarrhea and nausea.   Genitourinary:        Change in color of stool    Skin: Negative for rash.   Neurological: Negative for dizziness, light-headedness and headaches.         Objective    /72 (BP Location: Left arm, Patient Position: Chair,  Cuff Size: Adult Large)   Pulse 63   Temp 98.7  F (37.1  C) (Tympanic)   Wt 76.5 kg (168 lb 9.6 oz)   LMP 08/07/2020 (Within Days)   Breastfeeding No   BMI 32.93 kg/m    Body mass index is 32.93 kg/m .  Physical Exam  Constitutional:       Appearance: She is well-developed.   Cardiovascular:      Rate and Rhythm: Normal rate and regular rhythm.   Pulmonary:      Effort: Pulmonary effort is normal.      Breath sounds: Normal breath sounds.   Abdominal:      Tenderness: There is no abdominal tenderness.   Skin:     General: Skin is warm.   Neurological:      Mental Status: She is alert.              Assessment & Plan     Dysuria  - UA reflex to Microscopic and Culture  - Wet prep  - Urine Microscopic    BV (bacterial vaginosis)  Explained this is not a sexually transmitted disease.   Do not drink alcohol with this med as it can make you very ill.   Please follow up if your symptoms do not improve.   - metroNIDAZOLE (FLAGYL) 500 MG tablet; Take 1 tablet (500 mg) by mouth 2 times daily for 7 days      Return in about 1 week (around 8/28/2020), or if symptoms worsen or fail to improve.    FRANCES Cisneros [facility]

## 2020-08-21 NOTE — PATIENT INSTRUCTIONS
These are general instructions and may not be specific to you. Please call, email or follow up if you have any questions or concerns.       Patient Education     Bacterial Vaginosis    You have a vaginal infection called bacterial vaginosis (BV). Both good and bad bacteria are present in a healthy vagina. BV occurs when these bacteria get out of balance. The number of bad bacteria increase. And the number of good bacteria decrease. Although BV is associated with sexual activity, it is not a sexually transmitted disease.  BV may or may not cause symptoms. If symptoms do occur, they can include:    Thin, gray, milky-white, or sometimes green discharge    Unpleasant odor or  fishy  smell    Itching, burning, or pain in or around the vagina  It is not known what causes BV, but certain factors can make the problem more likely. This can include:    Douching    Having sex with a new partner    Having sex with more than one partner  BV will sometimes go away on its own. But treatment is usually recommended. This is because untreated BV can increase the risk of more serious health problems such as:    Pelvic inflammatory disease (PID)     delivery (giving birth to a baby early if you re pregnant)    HIV and certain other sexually transmitted diseases (STDs)    Infection after surgery on the reproductive organs  Home care  General care    BV is most often treated with medicines called antibiotics. These may be given as pills or as a vaginal cream. If antibiotics are prescribed, be sure to use them exactly as directed. Also, be sure to complete all of the medicine, even if your symptoms go away.    Don't douche or having sex during treatment.    If you have sex with a female partner, ask your healthcare provider if she should also be treated.  Prevention    Don't douche.    Don't have sex. If you do have sex, then take steps to lower your risk:  ? Use condoms when having sex.  ? Limit the number of sexual partners you  have.  Follow-up care  Follow up with your healthcare provider, or as advised.  When to seek medical advice  Call your healthcare provider right away if:    You have a fever of 100.4 F (38 C) or higher, or as directed by your provider.    Your symptoms worsen, or they don t go away within a few days of starting treatment.    You have new pain in the lower belly or pelvic region.    You have side effects that bother you or a reaction to the pills or cream you re prescribed.    You or any partners you have sex with have new symptoms, such as a rash, joint pain, or sores.  Date Last Reviewed: 10/1/2017    6790-4150 [employer]. [address] [place] [address], [place] [address]. All rights reserved. This information is not intended as a substitute for professional medical care. Always follow your healthcare professional's instructions.

## 2020-08-27 ENCOUNTER — TELEPHONE (OUTPATIENT)
Dept: FAMILY MEDICINE | Facility: CLINIC | Age: 45
End: 2020-08-27

## 2020-08-27 DIAGNOSIS — E63.9 DIETARY DEFICIENCY: ICD-10-CM

## 2020-08-27 DIAGNOSIS — E66.812 CLASS 2 OBESITY IN ADULT, UNSPECIFIED BMI, UNSPECIFIED OBESITY TYPE, UNSPECIFIED WHETHER SERIOUS COMORBIDITY PRESENT: ICD-10-CM

## 2020-08-28 RX ORDER — TOPIRAMATE 25 MG/1
TABLET, FILM COATED ORAL
Qty: 90 TABLET | Refills: 0 | Status: SHIPPED | OUTPATIENT
Start: 2020-08-28 | End: 2020-09-01

## 2020-08-28 RX ORDER — MULTIVITAMIN WITH FOLIC ACID 400 MCG
1 TABLET ORAL DAILY
Qty: 90 TABLET | Refills: 2 | Status: SHIPPED | OUTPATIENT
Start: 2020-08-28 | End: 2021-09-21

## 2020-08-28 NOTE — TELEPHONE ENCOUNTER
Patient called and informed.  Telephone visit scheduled for Tuesday, September 1st at 10:40 a.m. Nancy Riley,

## 2020-08-28 NOTE — TELEPHONE ENCOUNTER
"Per Monica Rios PA-C 7/28/2020 virtual visit notes, \"Return in about 4 weeks (around 8/25/2020) for Phone Visit\"    Please schedule    Caitlin Kelley RN    "

## 2020-09-01 ENCOUNTER — VIRTUAL VISIT (OUTPATIENT)
Dept: FAMILY MEDICINE | Facility: CLINIC | Age: 45
End: 2020-09-01
Payer: COMMERCIAL

## 2020-09-01 DIAGNOSIS — F32.1 MAJOR DEPRESSIVE DISORDER, SINGLE EPISODE, MODERATE DEGREE (H): ICD-10-CM

## 2020-09-01 DIAGNOSIS — E66.812 CLASS 2 OBESITY IN ADULT, UNSPECIFIED BMI, UNSPECIFIED OBESITY TYPE, UNSPECIFIED WHETHER SERIOUS COMORBIDITY PRESENT: ICD-10-CM

## 2020-09-01 PROCEDURE — 99213 OFFICE O/P EST LOW 20 MIN: CPT | Mod: 95 | Performed by: PHYSICIAN ASSISTANT

## 2020-09-01 RX ORDER — TOPIRAMATE 25 MG/1
TABLET, FILM COATED ORAL
Qty: 90 TABLET | Refills: 0 | Status: SHIPPED | OUTPATIENT
Start: 2020-09-01 | End: 2020-12-29

## 2020-09-01 RX ORDER — SERTRALINE HYDROCHLORIDE 100 MG/1
100 TABLET, FILM COATED ORAL DAILY
Qty: 30 TABLET | Refills: 1 | Status: SHIPPED | OUTPATIENT
Start: 2020-09-01 | End: 2020-12-02

## 2020-09-01 ASSESSMENT — PATIENT HEALTH QUESTIONNAIRE - PHQ9: SUM OF ALL RESPONSES TO PHQ QUESTIONS 1-9: 12

## 2020-09-01 NOTE — PROGRESS NOTES
"Cristobal Darnell is a 45 year old female who is being evaluated via a billable telephone visit.      The patient has been notified of following:     \"This telephone visit will be conducted via a call between you and your physician/provider. We have found that certain health care needs can be provided without the need for a physical exam.  This service lets us provide the care you need with a short phone conversation.  If a prescription is necessary we can send it directly to your pharmacy.  If lab work is needed we can place an order for that and you can then stop by our lab to have the test done at a later time.    Telephone visits are billed at different rates depending on your insurance coverage. During this emergency period, for some insurers they may be billed the same as an in-person visit.  Please reach out to your insurance provider with any questions.    If during the course of the call the physician/provider feels a telephone visit is not appropriate, you will not be charged for this service.\"    Patient has given verbal consent for Telephone visit?  Yes    What phone number would you like to be contacted at? [phone]    How would you like to obtain your AVS? Adrianne Recinos     Cristobal Darnell is a 45 year old female who presents via phone visit today for the following health issues:    HPI    Medication Followup of topiramate (TOPAMAX) 25 MG tablet    Taking Medication as prescribed: No. Only taking 2 tablets per day vs. 3 tablets per day    Side Effects:  Memory issues     Medication Helping Symptoms:  NO     Reviewed current medications.  Patient amenable to increasing Sertraline to 100 mg per day with follow up in 2 weeks.  She will start Topamax as directed if she is tolerating Sertraline.     Review of Systems   Constitutional, HEENT, cardiovascular, pulmonary, gi and gu systems are negative, except as otherwise noted.       Objective          Vitals:  No vitals were obtained today due to " virtual visit.    healthy, alert and no distress  PSYCH: Alert and oriented times 3; coherent speech, normal   rate and volume, able to articulate logical thoughts, able   to abstract reason, no tangential thoughts, no hallucinations   or delusions  Her affect is normal  RESP: No cough, no audible wheezing, able to talk in full sentences  Remainder of exam unable to be completed due to telephone visits            Assessment/Plan:    Assessment & Plan     Cristobal was seen today for recheck medication.    Diagnoses and all orders for this visit:    Class 2 obesity in adult, unspecified BMI, unspecified obesity type, unspecified whether serious comorbidity present  -     topiramate (TOPAMAX) 25 MG tablet; TAKE 3 TABLETS BY MOUTH DAILY    Major depressive disorder, single episode, moderate degree (H)  -     sertraline (ZOLOFT) 100 MG tablet; Take 1 tablet (100 mg) by mouth daily         BMI:   Estimated body mass index is 32.93 kg/m  as calculated from the following:    Height as of 6/15/20: 1.524 m (5').    Weight as of 8/21/20: 76.5 kg (168 lb 9.6 oz).   Weight management plan: Medication management      Return in about 2 weeks (around 9/15/2020) for Phone Visit.    Monica Rios PA-C  [facility]    Phone call duration:  10 minutes

## 2020-09-22 DIAGNOSIS — M22.42 CHONDROMALACIA OF BOTH PATELLAE: ICD-10-CM

## 2020-09-22 DIAGNOSIS — M22.41 CHONDROMALACIA OF BOTH PATELLAE: ICD-10-CM

## 2020-09-22 DIAGNOSIS — E66.812 CLASS 2 OBESITY IN ADULT, UNSPECIFIED BMI, UNSPECIFIED OBESITY TYPE, UNSPECIFIED WHETHER SERIOUS COMORBIDITY PRESENT: ICD-10-CM

## 2020-09-24 ENCOUNTER — TELEPHONE (OUTPATIENT)
Dept: FAMILY MEDICINE | Facility: CLINIC | Age: 45
End: 2020-09-24

## 2020-09-24 RX ORDER — ACETAMINOPHEN 650 MG/1
TABLET, FILM COATED, EXTENDED RELEASE ORAL
Qty: 60 TABLET | Refills: 10 | Status: SHIPPED | OUTPATIENT
Start: 2020-09-24

## 2020-09-24 NOTE — TELEPHONE ENCOUNTER
Routing refill request to provider for review/approval because:  Labs not current:  ALT, AST  Sisi HUNTN, RN

## 2020-09-24 NOTE — TELEPHONE ENCOUNTER
Reason for call:  Other   Patient called regarding (reason for call): call back  Additional comments: [facility], [address]   P: [phone]   F: [phone]  Pharmacy is calling with questions regarding sertraline (ZOLOFT) tablet they are wondering if it should be 100mg or 75mg? Please call back     Phone number to reach patient:  Home number on file [phone] (home)    Best Time:  any    Can we leave a detailed message on this number?  YES    Travel screening: Negative

## 2020-10-01 RX ORDER — TOPIRAMATE 25 MG/1
TABLET, FILM COATED ORAL
Start: 2020-10-01

## 2020-10-06 DIAGNOSIS — E66.812 CLASS 2 OBESITY IN ADULT, UNSPECIFIED BMI, UNSPECIFIED OBESITY TYPE, UNSPECIFIED WHETHER SERIOUS COMORBIDITY PRESENT: ICD-10-CM

## 2020-10-06 DIAGNOSIS — F32.1 MAJOR DEPRESSIVE DISORDER, SINGLE EPISODE, MODERATE DEGREE (H): ICD-10-CM

## 2020-10-06 DIAGNOSIS — E63.9 DIETARY DEFICIENCY: ICD-10-CM

## 2020-10-06 DIAGNOSIS — E53.8 VITAMIN B 12 DEFICIENCY: ICD-10-CM

## 2020-10-06 RX ORDER — VITAMIN B COMPLEX
1 TABLET ORAL DAILY
Qty: 90 TABLET | Refills: 0 | Status: CANCELLED | OUTPATIENT
Start: 2020-10-06

## 2020-10-06 NOTE — TELEPHONE ENCOUNTER
Reason for Call:  Other prescription    Detailed comments: pt needs refills of: sertraline 100 mg, topiramate 25 mg, vit b12 1000micrograms.      Phone Number Patient can be reached at: Other phone number:  pharm*    Best Time: any    Can we leave a detailed message on this number? YES    Call taken on 10/6/2020 at 8:08 AM by Billie Red

## 2020-10-07 RX ORDER — TOPIRAMATE 25 MG/1
TABLET, FILM COATED ORAL
Qty: 90 TABLET | Refills: 0 | OUTPATIENT
Start: 2020-10-07

## 2020-10-07 RX ORDER — SERTRALINE HYDROCHLORIDE 100 MG/1
100 TABLET, FILM COATED ORAL DAILY
Qty: 30 TABLET | Refills: 1 | OUTPATIENT
Start: 2020-10-07

## 2020-10-07 RX ORDER — LANOLIN ALCOHOL/MO/W.PET/CERES
1000 CREAM (GRAM) TOPICAL DAILY
Qty: 90 TABLET | Refills: 1 | OUTPATIENT
Start: 2020-10-07

## 2020-10-27 DIAGNOSIS — E66.812 CLASS 2 OBESITY IN ADULT, UNSPECIFIED BMI, UNSPECIFIED OBESITY TYPE, UNSPECIFIED WHETHER SERIOUS COMORBIDITY PRESENT: ICD-10-CM

## 2020-10-27 DIAGNOSIS — E53.8 VITAMIN B 12 DEFICIENCY: ICD-10-CM

## 2020-10-27 DIAGNOSIS — F32.1 MAJOR DEPRESSIVE DISORDER, SINGLE EPISODE, MODERATE DEGREE (H): ICD-10-CM

## 2020-10-28 RX ORDER — LANOLIN ALCOHOL/MO/W.PET/CERES
CREAM (GRAM) TOPICAL
Qty: 90 TABLET | Refills: 0 | Status: SHIPPED | OUTPATIENT
Start: 2020-10-28 | End: 2020-12-29

## 2020-10-28 RX ORDER — SERTRALINE HYDROCHLORIDE 100 MG/1
TABLET, FILM COATED ORAL
Qty: 30 TABLET | Refills: 10 | OUTPATIENT
Start: 2020-10-28

## 2020-10-28 RX ORDER — TOPIRAMATE 25 MG/1
TABLET, FILM COATED ORAL
Qty: 90 TABLET | Refills: 10 | OUTPATIENT
Start: 2020-10-28

## 2020-10-28 NOTE — TELEPHONE ENCOUNTER
Prescription approved per [facility]ill [facility].  Brianna Henry RN

## 2020-11-30 DIAGNOSIS — E66.812 CLASS 2 OBESITY IN ADULT, UNSPECIFIED BMI, UNSPECIFIED OBESITY TYPE, UNSPECIFIED WHETHER SERIOUS COMORBIDITY PRESENT: ICD-10-CM

## 2020-11-30 DIAGNOSIS — F32.1 MAJOR DEPRESSIVE DISORDER, SINGLE EPISODE, MODERATE DEGREE (H): ICD-10-CM

## 2020-12-02 RX ORDER — SERTRALINE HYDROCHLORIDE 100 MG/1
TABLET, FILM COATED ORAL
Qty: 30 TABLET | Refills: 0 | Status: SHIPPED | OUTPATIENT
Start: 2020-12-02 | End: 2020-12-14

## 2020-12-02 RX ORDER — TOPIRAMATE 25 MG/1
TABLET, FILM COATED ORAL
Start: 2020-12-02

## 2020-12-09 DIAGNOSIS — F32.1 MAJOR DEPRESSIVE DISORDER, SINGLE EPISODE, MODERATE DEGREE (H): ICD-10-CM

## 2020-12-11 ENCOUNTER — OFFICE VISIT (OUTPATIENT)
Dept: ORTHOPEDICS | Facility: CLINIC | Age: 45
End: 2020-12-11
Payer: COMMERCIAL

## 2020-12-11 VITALS
HEART RATE: 86 BPM | WEIGHT: 174 LBS | OXYGEN SATURATION: 99 % | DIASTOLIC BLOOD PRESSURE: 74 MMHG | BODY MASS INDEX: 34.16 KG/M2 | SYSTOLIC BLOOD PRESSURE: 111 MMHG | HEIGHT: 60 IN

## 2020-12-11 DIAGNOSIS — G56.03 BILATERAL CARPAL TUNNEL SYNDROME: Primary | ICD-10-CM

## 2020-12-11 PROCEDURE — 20526 THER INJECTION CARP TUNNEL: CPT | Mod: 50 | Performed by: ORTHOPAEDIC SURGERY

## 2020-12-11 RX ORDER — BUPIVACAINE HYDROCHLORIDE 2.5 MG/ML
1 INJECTION, SOLUTION EPIDURAL; INFILTRATION; INTRACAUDAL
Status: DISCONTINUED | OUTPATIENT
Start: 2020-12-11 | End: 2020-12-29

## 2020-12-11 RX ORDER — TRIAMCINOLONE ACETONIDE 40 MG/ML
40 INJECTION, SUSPENSION INTRA-ARTICULAR; INTRAMUSCULAR
Status: DISCONTINUED | OUTPATIENT
Start: 2020-12-11 | End: 2020-12-29

## 2020-12-11 RX ADMIN — BUPIVACAINE HYDROCHLORIDE 1 ML: 2.5 INJECTION, SOLUTION EPIDURAL; INFILTRATION; INTRACAUDAL at 10:11

## 2020-12-11 RX ADMIN — TRIAMCINOLONE ACETONIDE 40 MG: 40 INJECTION, SUSPENSION INTRA-ARTICULAR; INTRAMUSCULAR at 10:11

## 2020-12-11 ASSESSMENT — MIFFLIN-ST. JEOR: SCORE: 1350.76

## 2020-12-11 ASSESSMENT — PAIN SCALES - GENERAL: PAINLEVEL: SEVERE PAIN (6)
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
## 2020-12-14 RX ORDER — SERTRALINE HYDROCHLORIDE 100 MG/1
TABLET, FILM COATED ORAL
Qty: 30 TABLET | Refills: 0 | Status: SHIPPED | OUTPATIENT
Start: 2020-12-14 | End: 2020-12-28

## 2020-12-14 NOTE — TELEPHONE ENCOUNTER
Spoke to patient and made appointment for 12/29/2020.  Melita FERNANDEZ CMA ([facility])

## 2020-12-23 DIAGNOSIS — E66.812 CLASS 2 OBESITY IN ADULT, UNSPECIFIED BMI, UNSPECIFIED OBESITY TYPE, UNSPECIFIED WHETHER SERIOUS COMORBIDITY PRESENT: ICD-10-CM

## 2020-12-26 NOTE — TELEPHONE ENCOUNTER
Routing refill request to provider for review/approval because:  Drug not on the [facility] refill protocol   A break in medication

## 2020-12-28 ASSESSMENT — PATIENT HEALTH QUESTIONNAIRE - PHQ9: SUM OF ALL RESPONSES TO PHQ QUESTIONS 1-9: 11

## 2020-12-29 ENCOUNTER — OFFICE VISIT (OUTPATIENT)
Dept: FAMILY MEDICINE | Facility: CLINIC | Age: 45
End: 2020-12-29
Payer: COMMERCIAL

## 2020-12-29 VITALS
SYSTOLIC BLOOD PRESSURE: 132 MMHG | BODY MASS INDEX: 33.4 KG/M2 | DIASTOLIC BLOOD PRESSURE: 86 MMHG | TEMPERATURE: 98.2 F | WEIGHT: 171 LBS | HEART RATE: 73 BPM

## 2020-12-29 DIAGNOSIS — Z32.00 PREGNANCY EXAMINATION OR TEST, PREGNANCY UNCONFIRMED: Primary | ICD-10-CM

## 2020-12-29 DIAGNOSIS — E53.8 VITAMIN B 12 DEFICIENCY: ICD-10-CM

## 2020-12-29 DIAGNOSIS — Z23 NEED FOR PROPHYLACTIC VACCINATION AND INOCULATION AGAINST INFLUENZA: ICD-10-CM

## 2020-12-29 DIAGNOSIS — F32.1 MAJOR DEPRESSIVE DISORDER, SINGLE EPISODE, MODERATE DEGREE (H): ICD-10-CM

## 2020-12-29 DIAGNOSIS — R82.90 ABNORMAL URINE ODOR: ICD-10-CM

## 2020-12-29 DIAGNOSIS — E63.9 DIETARY DEFICIENCY: ICD-10-CM

## 2020-12-29 LAB
ALBUMIN UR-MCNC: NEGATIVE MG/DL
APPEARANCE UR: CLEAR
BILIRUB UR QL STRIP: NEGATIVE
COLOR UR AUTO: YELLOW
GLUCOSE UR STRIP-MCNC: NEGATIVE MG/DL
HCG UR QL: NEGATIVE
HGB UR QL STRIP: ABNORMAL
KETONES UR STRIP-MCNC: NEGATIVE MG/DL
LEUKOCYTE ESTERASE UR QL STRIP: NEGATIVE
NITRATE UR QL: NEGATIVE
NON-SQ EPI CELLS #/AREA URNS LPF: NORMAL /LPF
PH UR STRIP: 6 PH (ref 5–7)
RBC #/AREA URNS AUTO: NORMAL /HPF
SOURCE: ABNORMAL
SP GR UR STRIP: <=1.005 (ref 1–1.03)
UROBILINOGEN UR STRIP-ACNC: 0.2 EU/DL (ref 0.2–1)
WBC #/AREA URNS AUTO: NORMAL /HPF

## 2020-12-29 PROCEDURE — 90471 IMMUNIZATION ADMIN: CPT | Performed by: PHYSICIAN ASSISTANT

## 2020-12-29 PROCEDURE — 99214 OFFICE O/P EST MOD 30 MIN: CPT | Mod: 25 | Performed by: PHYSICIAN ASSISTANT

## 2020-12-29 PROCEDURE — 90686 IIV4 VACC NO PRSV 0.5 ML IM: CPT | Performed by: PHYSICIAN ASSISTANT

## 2020-12-29 PROCEDURE — 81025 URINE PREGNANCY TEST: CPT | Performed by: PHYSICIAN ASSISTANT

## 2020-12-29 PROCEDURE — 81001 URINALYSIS AUTO W/SCOPE: CPT | Performed by: PHYSICIAN ASSISTANT

## 2020-12-29 RX ORDER — VITAMIN B COMPLEX
1 TABLET ORAL DAILY
Qty: 90 TABLET | Refills: 3 | Status: SHIPPED | OUTPATIENT
Start: 2020-12-29

## 2020-12-29 RX ORDER — SERTRALINE HYDROCHLORIDE 100 MG/1
TABLET, FILM COATED ORAL
Qty: 90 TABLET | Refills: 1 | Status: SHIPPED | OUTPATIENT
Start: 2020-12-29 | End: 2022-12-15

## 2020-12-29 RX ORDER — TOPIRAMATE 25 MG/1
TABLET, FILM COATED ORAL
Qty: 90 TABLET | Refills: 10 | Status: SHIPPED | OUTPATIENT
Start: 2020-12-29

## 2020-12-29 RX ORDER — LANOLIN ALCOHOL/MO/W.PET/CERES
1000 CREAM (GRAM) TOPICAL DAILY
Qty: 90 TABLET | Refills: 3 | Status: SHIPPED | OUTPATIENT
Start: 2020-12-29 | End: 2021-01-20

## 2020-12-29 NOTE — NURSING NOTE
Made copy of [place] department of public safety  and vehicle services application for disability parking certificate and put it into abstracting. Gave pt copy of form.    ROBERT Guevara MA

## 2020-12-29 NOTE — PROGRESS NOTES
Subjective     Cristobal Darnell is a 45 year old female who presents to clinic today for the following health issues:    HPI         Depression Followup    How are you doing with your depression since your last visit? Improved better    Are you having other symptoms that might be associated with depression? No    Have you had a significant life event?  No     Are you feeling anxious or having panic attacks?   No    Do you have any concerns with your use of alcohol or other drugs? No    Social History     Tobacco Use     Smoking status: Never Smoker     Smokeless tobacco: Never Used   Substance Use Topics     Alcohol use: No     Alcohol/week: 0.0 standard drinks     Drug use: No     PHQ 7/28/2020 9/1/2020 12/28/2020   PHQ-9 Total Score 16 12 11   Q9: Thoughts of better off dead/self-harm past 2 weeks Not at all Not at all Not at all     ALPESH-7 SCORE 9/25/2017 1/23/2019 7/28/2020   Total Score - - -   Total Score 18 15 3       Mood is better. No trouble. No suicidal thoughts. No anxiety symptoms.   Never misses doses.       Suicide Assessment Five-step Evaluation and Treatment (SAFE-T)      How many servings of fruits and vegetables do you eat daily?  0-1    On average, how many sweetened beverages do you drink each day (Examples: soda, juice, sweet tea, etc.  Do NOT count diet or artificially sweetened beverages)?   1    How many days per week do you exercise enough to make your heart beat faster? 3 or less    How many minutes a day do you exercise enough to make your heart beat faster? 9 or less    How many days per week do you miss taking your medication? 0    Genitourinary - Female  Onset/Duration: x10 days  Description:   Painful urination (Dysuria): no           Frequency: YES- has an odor. No color change.   Blood in urine (Hematuria): no  Delay in urine (Hesitency): no  Intensity: mild  Progression of Symptoms:  same  Accompanying Signs & Symptoms:  Fever/chills: no  Flank pain: no  Nausea and vomiting:  no  Vaginal symptoms: odor  Abdominal/Pelvic Pain: no  History:   History of frequent UTI s: no  History of kidney stones: no  Sexually Active: YES  Possibility of pregnancy: Don't Know  Precipitating or alleviating factors: None  Therapies tried and outcome: Increase fluid intake        Pt would like a pregnancy test completed. LMP 11/15/2020. Does not usually miss periods.     Pt needs handicap forms completed. Patient with a history of chronic back pain, previously treated with therapy and over the counter tylenol. Had a handicap form from Dr. Cardenas which has . She notes back pain is aggravated by walking long distances.       Review of Systems   Constitutional, HEENT, cardiovascular, pulmonary, gi and gu systems are negative, except as otherwise noted.      Objective    /86 (BP Location: Right arm, Patient Position: Chair, Cuff Size: Adult Large)   Pulse 73   Temp 98.2  F (36.8  C) (Oral)   Wt 77.6 kg (171 lb)   LMP 11/15/2020   Breastfeeding No   BMI 33.40 kg/m    Body mass index is 33.4 kg/m .  Physical Exam   GENERAL: healthy, alert and no distress  PSYCH: mentation appears normal, affect normal/bright            Assessment & Plan     Pregnancy examination or test, pregnancy unconfirmed  Not pregnant, discussed perimenopause.   - HCG Qual, Urine ([id number])  - Urine Microscopic    Major depressive disorder, single episode, moderate degree (H)  Stable. Will refill for 6 months.   - sertraline (ZOLOFT) 100 MG tablet; TAKE 1 TABLET BY MOUTH DAILY *PATIENT NEEDS APPOINTMENT*    Dietary deficiency  - Vitamin D3 (VITAMIN D3) 25 mcg (1000 units) tablet; Take 1 tablet (25 mcg) by mouth daily    Vitamin B 12 deficiency  - cyanocobalamin (VITAMIN B-12) 1000 MCG tablet; Take 1 tablet (1,000 mcg) by mouth daily    Need for prophylactic vaccination and inoculation against influenza  - INFLUENZA VACCINE IM > 6 MONTHS VALENT IIV4 [04088]    Abnormal urine odor  No UTI, monitor for worsening symptoms.    - *UA reflex to Microscopic and Culture ([facility] and [facility] (except [place] and [place])            No follow-ups on file.    Deysi Ayon PA-C  [facility]

## 2021-01-02 RX ORDER — SERTRALINE HYDROCHLORIDE 100 MG/1
TABLET, FILM COATED ORAL
Qty: 90 TABLET | Refills: 0 | Status: SHIPPED | OUTPATIENT
Start: 2021-01-02 | End: 2021-03-25

## 2021-01-20 DIAGNOSIS — E53.8 VITAMIN B 12 DEFICIENCY: ICD-10-CM

## 2021-01-20 RX ORDER — LANOLIN ALCOHOL/MO/W.PET/CERES
CREAM (GRAM) TOPICAL
Qty: 90 TABLET | Refills: 2 | Status: SHIPPED | OUTPATIENT
Start: 2021-01-20 | End: 2022-12-15

## 2021-02-23 ENCOUNTER — ANCILLARY PROCEDURE (OUTPATIENT)
Dept: MAMMOGRAPHY | Facility: CLINIC | Age: 46
End: 2021-02-23
Payer: COMMERCIAL

## 2021-02-23 DIAGNOSIS — Z12.31 VISIT FOR SCREENING MAMMOGRAM: ICD-10-CM

## 2021-02-23 PROCEDURE — 77067 SCR MAMMO BI INCL CAD: CPT | Mod: TC | Performed by: RADIOLOGY

## 2021-03-01 ENCOUNTER — TELEPHONE (OUTPATIENT)
Dept: FAMILY MEDICINE | Facility: CLINIC | Age: 46
End: 2021-03-01

## 2021-03-01 NOTE — TELEPHONE ENCOUNTER
Mammogram results need to be fax to [facility]  @ [facility] fax [phone] needs to be ASAP    [place] Funes Pat. Rep

## 2021-03-05 ENCOUNTER — VIRTUAL VISIT (OUTPATIENT)
Dept: FAMILY MEDICINE | Facility: OTHER | Age: 46
End: 2021-03-05
Payer: COMMERCIAL

## 2021-03-05 DIAGNOSIS — Z20.822 PERSON UNDER INVESTIGATION FOR COVID-19: Primary | ICD-10-CM

## 2021-03-05 PROCEDURE — 99212 OFFICE O/P EST SF 10 MIN: CPT | Mod: 95 | Performed by: PHYSICIAN ASSISTANT

## 2021-03-05 NOTE — PROGRESS NOTES
Cristobal is a 45 year old who is being evaluated via a billable telephone visit.      What phone number would you like to be contacted at? [phone]  How would you like to obtain your AVS? Mail a copy    Assessment & Plan       ICD-10-CM    1. Person under investigation for COVID-19  Z20.822 Asymptomatic COVID-19 Virus (Coronavirus) by PCR       COVID-19 travel screening ordered to be completed within 72 hours of 3/9. Will notify patient of results.       Javier Thomason PA-C  [facility]   Cristobal is a 45 year old who presents for the following health issues:    HPI     Patient is leaving for [place] on Tuesday, 3/9, and needs a negative COVID-19 within 72 hours of departure. She denies any sick symptoms or known exposure to COVID-19.    Review of Systems   Constitutional, HEENT, cardiovascular, pulmonary, gi and gu systems are negative, except as otherwise noted.      Objective       Vitals:  No vitals were obtained today due to virtual visit.    Physical Exam   General: alert and no distress over the phone  PSYCH: Alert and oriented times 3; coherent speech, normal   rate and volume, able to articulate logical thoughts, able   to abstract reason, no tangential thoughts, no hallucinations   or delusions. Her affect is normal  RESP: No cough, no audible wheezing, able to talk in full sentences  Remainder of exam unable to be completed due to telephone visits      Phone call duration: 5 minutes

## 2021-03-06 ENCOUNTER — OFFICE VISIT (OUTPATIENT)
Dept: URGENT CARE | Facility: URGENT CARE | Age: 46
End: 2021-03-06
Attending: PHYSICIAN ASSISTANT
Payer: COMMERCIAL

## 2021-03-06 DIAGNOSIS — Z20.822 PERSON UNDER INVESTIGATION FOR COVID-19: ICD-10-CM

## 2021-03-06 LAB
SARS-COV-2 RNA RESP QL NAA+PROBE: NORMAL
SPECIMEN SOURCE: NORMAL

## 2021-03-06 PROCEDURE — U0003 INFECTIOUS AGENT DETECTION BY NUCLEIC ACID (DNA OR RNA); SEVERE ACUTE RESPIRATORY SYNDROME CORONAVIRUS 2 (SARS-COV-2) (CORONAVIRUS DISEASE [COVID-19]), AMPLIFIED PROBE TECHNIQUE, MAKING USE OF HIGH THROUGHPUT TECHNOLOGIES AS DESCRIBED BY CMS-2020-01-R: HCPCS | Performed by: PHYSICIAN ASSISTANT

## 2021-03-06 PROCEDURE — U0005 INFEC AGEN DETEC AMPLI PROBE: HCPCS | Performed by: PHYSICIAN ASSISTANT

## 2021-03-07 LAB
LABORATORY COMMENT REPORT: NORMAL
SARS-COV-2 RNA RESP QL NAA+PROBE: NEGATIVE
SPECIMEN SOURCE: NORMAL

## 2021-03-08 ENCOUNTER — TELEPHONE (OUTPATIENT)
Dept: FAMILY MEDICINE | Facility: CLINIC | Age: 46
End: 2021-03-08

## 2021-03-08 NOTE — TELEPHONE ENCOUNTER
----- Message from Javier Thomason PA-C sent at 3/8/2021  6:57 AM CST -----  Please call and notify patient that COVID-19 testing is normal.    Javier Thomason PA-C

## 2021-06-17 DIAGNOSIS — F32.1 MAJOR DEPRESSIVE DISORDER, SINGLE EPISODE, MODERATE DEGREE (H): ICD-10-CM

## 2021-06-18 RX ORDER — SERTRALINE HYDROCHLORIDE 100 MG/1
TABLET, FILM COATED ORAL
Qty: 30 TABLET | Refills: 0 | Status: SHIPPED | OUTPATIENT
Start: 2021-06-18 | End: 2022-12-15

## 2021-06-18 NOTE — TELEPHONE ENCOUNTER
Routing refill request to provider for review/approval because:  PHQ    PHQ-9 score:    PHQ 12/28/2020   PHQ-9 Total Score 11   Q9: Thoughts of better off dead/self-harm past 2 weeks Not at all                     Pending Prescriptions:                       Disp   Refills    sertraline (ZOLOFT) 100 MG tablet [Pharmac*30 tab*0        Sig: TAKE 1 TABLET BY MOUTH ONCE DAILY *PATIENT NEEDS           APPOINTMENT*        Brannon Naylor RN

## 2021-07-12 ENCOUNTER — OFFICE VISIT (OUTPATIENT)
Dept: ORTHOPEDICS | Facility: CLINIC | Age: 46
End: 2021-07-12
Payer: COMMERCIAL

## 2021-07-12 VITALS
BODY MASS INDEX: 31.22 KG/M2 | SYSTOLIC BLOOD PRESSURE: 121 MMHG | HEIGHT: 60 IN | WEIGHT: 159 LBS | HEART RATE: 54 BPM | DIASTOLIC BLOOD PRESSURE: 80 MMHG

## 2021-07-12 DIAGNOSIS — G56.03 CARPAL TUNNEL SYNDROME ON BOTH SIDES: Primary | ICD-10-CM

## 2021-07-12 PROCEDURE — 20526 THER INJECTION CARP TUNNEL: CPT | Mod: 50 | Performed by: ORTHOPAEDIC SURGERY

## 2021-07-12 RX ORDER — TRIAMCINOLONE ACETONIDE 40 MG/ML
80 INJECTION, SUSPENSION INTRA-ARTICULAR; INTRAMUSCULAR
Status: DISCONTINUED | OUTPATIENT
Start: 2021-07-12 | End: 2022-05-16

## 2021-07-12 RX ORDER — BUPIVACAINE HYDROCHLORIDE 2.5 MG/ML
3 INJECTION, SOLUTION INFILTRATION; PERINEURAL
Status: DISCONTINUED | OUTPATIENT
Start: 2021-07-12 | End: 2022-05-16

## 2021-07-12 RX ADMIN — BUPIVACAINE HYDROCHLORIDE 3 ML: 2.5 INJECTION, SOLUTION INFILTRATION; PERINEURAL at 14:58

## 2021-07-12 RX ADMIN — TRIAMCINOLONE ACETONIDE 80 MG: 40 INJECTION, SUSPENSION INTRA-ARTICULAR; INTRAMUSCULAR at 14:58

## 2021-07-12 ASSESSMENT — MIFFLIN-ST. JEOR: SCORE: 1277.72

## 2021-07-12 ASSESSMENT — PAIN SCALES - GENERAL: PAINLEVEL: SEVERE PAIN (7)
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
## 2021-09-18 DIAGNOSIS — E63.9 DIETARY DEFICIENCY: ICD-10-CM

## 2021-09-21 RX ORDER — MULTIVITAMIN WITH FOLIC ACID 400 MCG
TABLET ORAL
Qty: 90 TABLET | Refills: 1 | Status: SHIPPED | OUTPATIENT
Start: 2021-09-21

## 2021-11-16 DIAGNOSIS — F32.1 MAJOR DEPRESSIVE DISORDER, SINGLE EPISODE, MODERATE DEGREE (H): Primary | ICD-10-CM

## 2021-11-18 RX ORDER — SERTRALINE HYDROCHLORIDE 100 MG/1
TABLET, FILM COATED ORAL
Start: 2021-11-18

## 2021-11-18 NOTE — TELEPHONE ENCOUNTER
Emma refill already given. No appointment scheduled.   Refused Prescriptions:                       Disp   Refills    sertraline (ZOLOFT) 100 MG tablet [Pharmac*                Sig: TAKE ONE TABLET BY MOUTH DAILY** NEEDS TO BE SEEN FOR           MORE REFILLS  Refused By: MASSIMO GIPSON  Reason for Refusal: Patient needs appointment

## 2021-11-18 NOTE — TELEPHONE ENCOUNTER
"Routing refill request to provider for review/approval because:  Drug interaction warnings    Requested Prescriptions   Pending Prescriptions Disp Refills     sertraline (ZOLOFT) 100 MG tablet [Pharmacy Med Name: SERTRALINE 100MG TABLETS] 30 tablet      Sig: TAKE ONE TABLET BY MOUTH DAILY** NEEDS TO BE SEEN FOR MORE REFILLS       SSRIs Protocol Passed - 11/16/2021 12:45 PM        Passed - Recent (12 mo) or future (30 days) visit within the authorizing provider's specialty     Patient has had an office visit with the authorizing provider or a provider within the authorizing providers department within the previous 12 mos or has a future within next 30 days. See \"Patient Info\" tab in inbasket, or \"Choose Columns\" in Meds & Orders section of the refill encounter.              Passed - Medication is active on med list        Passed - Patient is age 18 or older        Passed - No active pregnancy on record        Passed - No positive pregnancy test in last 12 months           Sisi Conner RN on 11/18/2021 at 9:04 AM    "

## 2021-12-09 ENCOUNTER — LAB REQUISITION (OUTPATIENT)
Dept: LAB | Facility: CLINIC | Age: 46
End: 2021-12-09
Payer: COMMERCIAL

## 2021-12-09 PROCEDURE — 88305 TISSUE EXAM BY PATHOLOGIST: CPT | Mod: TC,ORL | Performed by: PLASTIC SURGERY

## 2021-12-17 LAB
PATH REPORT.COMMENTS IMP SPEC: NORMAL
PATH REPORT.COMMENTS IMP SPEC: NORMAL
PATH REPORT.FINAL DX SPEC: NORMAL
PATH REPORT.GROSS SPEC: NORMAL
PATH REPORT.MICROSCOPIC SPEC OTHER STN: NORMAL
PATH REPORT.RELEVANT HX SPEC: NORMAL
PHOTO IMAGE: NORMAL

## 2021-12-17 PROCEDURE — 88305 TISSUE EXAM BY PATHOLOGIST: CPT | Mod: 26 | Performed by: PATHOLOGY

## 2022-02-21 DIAGNOSIS — F32.1 MAJOR DEPRESSIVE DISORDER, SINGLE EPISODE, MODERATE DEGREE (H): ICD-10-CM

## 2022-02-22 RX ORDER — SERTRALINE HYDROCHLORIDE 100 MG/1
TABLET, FILM COATED ORAL
OUTPATIENT
Start: 2022-02-22

## 2022-05-15 RX ORDER — SERTRALINE HYDROCHLORIDE 100 MG/1
TABLET, FILM COATED ORAL
Qty: 30 TABLET | OUTPATIENT
Start: 2022-05-15

## 2022-05-16 ENCOUNTER — OFFICE VISIT (OUTPATIENT)
Dept: ORTHOPEDICS | Facility: CLINIC | Age: 47
End: 2022-05-16
Payer: COMMERCIAL

## 2022-05-16 VITALS
WEIGHT: 173.4 LBS | HEART RATE: 78 BPM | HEIGHT: 60 IN | SYSTOLIC BLOOD PRESSURE: 115 MMHG | BODY MASS INDEX: 34.04 KG/M2 | DIASTOLIC BLOOD PRESSURE: 80 MMHG

## 2022-05-16 DIAGNOSIS — G56.03 BILATERAL CARPAL TUNNEL SYNDROME: Primary | ICD-10-CM

## 2022-05-16 DIAGNOSIS — R20.0 BILATERAL HAND NUMBNESS: ICD-10-CM

## 2022-05-16 PROCEDURE — 99213 OFFICE O/P EST LOW 20 MIN: CPT | Mod: 25 | Performed by: ORTHOPAEDIC SURGERY

## 2022-05-16 PROCEDURE — 20526 THER INJECTION CARP TUNNEL: CPT | Mod: 50 | Performed by: ORTHOPAEDIC SURGERY

## 2022-05-16 RX ORDER — TRIAMCINOLONE ACETONIDE 40 MG/ML
40 INJECTION, SUSPENSION INTRA-ARTICULAR; INTRAMUSCULAR
Status: DISCONTINUED | OUTPATIENT
Start: 2022-05-16 | End: 2022-10-26

## 2022-05-16 RX ADMIN — TRIAMCINOLONE ACETONIDE 40 MG: 40 INJECTION, SUSPENSION INTRA-ARTICULAR; INTRAMUSCULAR at 16:57

## 2022-05-16 ASSESSMENT — PAIN SCALES - GENERAL: PAINLEVEL: SEVERE PAIN (6)
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
## 2022-07-22 ENCOUNTER — OFFICE VISIT (OUTPATIENT)
Dept: NEUROLOGY | Facility: CLINIC | Age: 47
End: 2022-07-22
Attending: ORTHOPAEDIC SURGERY
Payer: COMMERCIAL

## 2022-07-22 DIAGNOSIS — G56.03 BILATERAL CARPAL TUNNEL SYNDROME: Primary | ICD-10-CM

## 2022-07-22 PROCEDURE — 95885 MUSC TST DONE W/NERV TST LIM: CPT | Mod: 59 | Performed by: PHYSICAL MEDICINE & REHABILITATION

## 2022-07-22 PROCEDURE — 95886 MUSC TEST DONE W/N TEST COMP: CPT | Performed by: PHYSICAL MEDICINE & REHABILITATION

## 2022-07-22 PROCEDURE — 95909 NRV CNDJ TST 5-6 STUDIES: CPT | Performed by: PHYSICAL MEDICINE & REHABILITATION

## 2022-07-22 NOTE — LETTER
2022       RE: Cristobal Diggs  [address]  [place] [address]     Dear Colleague,    Thank you for referring your patient, Cristobal Diggs, to the [facility] [place] at [facility]. Please see a copy of my visit note below.                          [facility]  Electrodiagnostic Laboratory                 Department of Neurology                                                                                                         Test Date:  2022    Patient: Cristobal Diggs : 1975 Physician: Brisa Recinos MD   Sex: Female AGE: 47 year Ref Phys: Diego Serna MD   ID#: [id number]   Technician: KARLIE Lea       Clinical Information:  Cristobal Diggs is a 48 yo female who presents with paresthesia in her hands. 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 Query median mononeuropathy vs cervical radiculopathy.       Techniques:  Motor and sensory conduction studies were done with surface recording electrodes. EMG was done with a concentric needle electrode.       Results:  Evaluation of the left Median (APB) motor nerve showed prolonged distal onset latency (6.2 ms) and reduced amplitude (3.8 mV).  The right Median (APB) motor nerve showed prolonged distal onset latency (5.3 ms).     Bilateral median and ulnar second lumbrical/palmar interossei comparison motor NCSs showed a difference between the median and ulnar motor distal latencies of 3.4 ms on the left and 2.5 ms on the right (normal is <0.4 ms).     The left median sensory and the right median sensory nerves showed reduced amplitude (L2, R7  V), reduced amplitude (4  V), and decreased conduction velocity (L28, R29 m/s).  All remaining nerves (as indicated in the following tables) were within normal limits.      Needle evaluation of the left  Abductor Pollicis Brevis muscle showed recruitment.  All remaining muscles (as indicated in the following table) showed no evidence of electrical instability.        Interpretation:  Abnormal study.     --There is electrodiagnostic evidence of bilateral median neuropathies at the wrist consistent with carpal tunnel syndrome, moderate-to-severe on the left and moderate on the right.    --There is no electrodiagnostic evidence of ulnar mononeuropathy in either upper extremity.    --There is no electrodiagnostic evidence of left upper extremity radiculopathy.    --There is no electrodiagnostic evidence of C8/T1 radiculopathy in the right upper extremity.        ___________________________  Brisa Recinos MD        Nerve Conduction Studies  Motor Sites      Latency Amplitude Neg. Amp Diff Segment Distance Velocity Neg. Dur Neg Area Diff Temperature Comment   Site (ms) Norm (mV) Norm %  cm m/s Norm ms %  C    Left Median (APB) Motor   Wrist 6.2  < 4.4 3.8  > 5.0  Wrist-APB 8   6.5  32.5    Elbow 10.2 - 3.6 - -5.3 Elbow-Wrist 20 50  > 48 6.5 -6.7 32.5    Right Median (APB) Motor   Wrist 5.3  < 4.4 5.2  > 5.0  Wrist-APB 8   5.9  32.8    Elbow 9.8 - 4.5 - -13.5 Elbow-Wrist 22 49  > 48 6.0 -11.9 32.7    Left Median/Ulnar (Lumb-Dors Int II) Motor        Median (Lumb I)   Wrist 6.0 - 0.94 -      5.0  32.8         Ulnar (Dorsal Int (manus))   Wrist 2.6 - 3.4 -      4.2  32.7    Right Median/Ulnar (Lumb-Dors Int II) Motor        Median (Lumb I)   Wrist 5.2 - 0.61 -      9.4  32.8         Ulnar (Dorsal Int (manus))   Wrist 2.7 - 3.0 -      3.7  32.8    Left Ulnar (ADM) Motor   Wrist 2.5  < 3.5 9.7  > 5.0  Wrist-ADM 8   5.1  32.5    Bel Elbow 5.5 - 8.5 - -12.4 Bel Elbow-Wrist 18 60  > 48 5.5 -5.4 32.4    Abv Elbow 7.2 - 8.3 - -2.4 Abv Elbow-Bel Elbow 10 59  > 48 5.4 3.0 32.4    Right Ulnar (ADM) Motor   Wrist 2.5  < 3.5 10.4  > 5.0  Wrist-ADM 8   4.7  32.4    Bel Elbow 5.6 - 9.1 - -12.5 Bel Elbow-Wrist 19 61  > 48 4.6 -5.8 32.5     Abv Elbow 7.1 - 8.3 - -8.8 Abv Elbow-Bel Elbow 9 60  > 48 4.8 -3.5 32.5      Sensory Sites      Onset Lat Peak Lat Amp (O-P) Amp (P-P) Segment Distance Velocity Temperature Comment   Site ms ms  V Norm  V  cm m/s Norm  C    Left Median Sensory   Wrist-Dig II 5.0 5.9 2  > 10 4 Wrist-Dig II 14 28  > 48 32.5    Right Median Sensory   Wrist-Dig II 4.8 6.0 7  > 10 11 Wrist-Dig II 14 29  > 48 32.5    Left Ulnar Sensory   Wrist-Dig V 2.1 2.7 29  > 8 44 Wrist-Dig V 12.5 60  > 48 24.4    Right Ulnar Sensory   Wrist-Dig V 2.2 2.7 20  > 8 40 Wrist-Dig V 12.5 57  > 48 32.7        Electromyography     Side Muscle Ins Act Fibs/PSW Fasc HF Amp Dur Poly Recrt Int Pat   Left APB Nml None Nml 0 Nml Nml 0 Heidy Nml   Left FDI Nml None Nml 0 Nml Nml 0 Nml Nml   Left Pronator Teres Nml None Nml 0 Nml Nml 0 Nml Nml   Left Biceps Nml None Nml 0 Nml Nml 0 Nml Nml   Left Deltoid Nml None Nml 0 Nml Nml 0 Nml Nml   Left Triceps Nml None Nml 0 Nml Nml 0 Nml Nml   Right APB Nml None Nml 0 Nml Nml 0 Nml Nml   Right FDI Nml None Nml 0 Nml Nml 0 Nml Nml         NCS Waveforms:    Motor                    Sensory                  Ultrasound Images:        Sincerely,    Brisa Recinos MD

## 2022-07-22 NOTE — PROGRESS NOTES
[facility]  Electrodiagnostic Laboratory                 Department of Neurology                                                                                                         Test Date:  2022    Patient: Cristobal Diggs : 1975 Physician: Brisa Recinos MD   Sex: Female AGE: 47 year Ref Phys: Diego Serna MD   ID#: [id number]   Technician: KARLIE Lea       Clinical Information:  Cristobal Diggs is a 48 yo female who presents with paresthesia in her hands. 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 Query median mononeuropathy vs cervical radiculopathy.       Techniques:  Motor and sensory conduction studies were done with surface recording electrodes. EMG was done with a concentric needle electrode.       Results:  Evaluation of the left Median (APB) motor nerve showed prolonged distal onset latency (6.2 ms) and reduced amplitude (3.8 mV).  The right Median (APB) motor nerve showed prolonged distal onset latency (5.3 ms).     Bilateral median and ulnar second lumbrical/palmar interossei comparison motor NCSs showed a difference between the median and ulnar motor distal latencies of 3.4 ms on the left and 2.5 ms on the right (normal is <0.4 ms).     The left median sensory and the right median sensory nerves showed reduced amplitude (L2, R7  V), reduced amplitude (4  V), and decreased conduction velocity (L28, R29 m/s).  All remaining nerves (as indicated in the following tables) were within normal limits.      Needle evaluation of the left Abductor Pollicis Brevis muscle showed recruitment.  All remaining muscles (as indicated in the following table) showed no evidence of electrical instability.        Interpretation:  Abnormal study.     --There is electrodiagnostic evidence of bilateral median neuropathies at the wrist consistent with carpal  tunnel syndrome, moderate-to-severe on the left and moderate on the right.    --There is no electrodiagnostic evidence of ulnar mononeuropathy in either upper extremity.    --There is no electrodiagnostic evidence of left upper extremity radiculopathy.    --There is no electrodiagnostic evidence of C8/T1 radiculopathy in the right upper extremity.        ___________________________  Brisa Recinos MD        Nerve Conduction Studies  Motor Sites      Latency Amplitude Neg. Amp Diff Segment Distance Velocity Neg. Dur Neg Area Diff Temperature Comment   Site (ms) Norm (mV) Norm %  cm m/s Norm ms %  C    Left Median (APB) Motor   Wrist 6.2  < 4.4 3.8  > 5.0  Wrist-APB 8   6.5  32.5    Elbow 10.2 - 3.6 - -5.3 Elbow-Wrist 20 50  > 48 6.5 -6.7 32.5    Right Median (APB) Motor   Wrist 5.3  < 4.4 5.2  > 5.0  Wrist-APB 8   5.9  32.8    Elbow 9.8 - 4.5 - -13.5 Elbow-Wrist 22 49  > 48 6.0 -11.9 32.7    Left Median/Ulnar (Lumb-Dors Int II) Motor        Median (Lumb I)   Wrist 6.0 - 0.94 -      5.0  32.8         Ulnar (Dorsal Int (manus))   Wrist 2.6 - 3.4 -      4.2  32.7    Right Median/Ulnar (Lumb-Dors Int II) Motor        Median (Lumb I)   Wrist 5.2 - 0.61 -      9.4  32.8         Ulnar (Dorsal Int (manus))   Wrist 2.7 - 3.0 -      3.7  32.8    Left Ulnar (ADM) Motor   Wrist 2.5  < 3.5 9.7  > 5.0  Wrist-ADM 8   5.1  32.5    Bel Elbow 5.5 - 8.5 - -12.4 Bel Elbow-Wrist 18 60  > 48 5.5 -5.4 32.4    Abv Elbow 7.2 - 8.3 - -2.4 Abv Elbow-Bel Elbow 10 59  > 48 5.4 3.0 32.4    Right Ulnar (ADM) Motor   Wrist 2.5  < 3.5 10.4  > 5.0  Wrist-ADM 8   4.7  32.4    Bel Elbow 5.6 - 9.1 - -12.5 Bel Elbow-Wrist 19 61  > 48 4.6 -5.8 32.5    Abv Elbow 7.1 - 8.3 - -8.8 Abv Elbow-Bel Elbow 9 60  > 48 4.8 -3.5 32.5      Sensory Sites      Onset Lat Peak Lat Amp (O-P) Amp (P-P) Segment Distance Velocity Temperature Comment   Site ms ms  V Norm  V  cm m/s Norm  C    Left Median Sensory   Wrist-Dig II 5.0 5.9 2  > 10 4 Wrist-Dig II 14 28  > 48 32.5     Right Median Sensory   Wrist-Dig II 4.8 6.0 7  > 10 11 Wrist-Dig II 14 29  > 48 32.5    Left Ulnar Sensory   Wrist-Dig V 2.1 2.7 29  > 8 44 Wrist-Dig V 12.5 60  > 48 24.4    Right Ulnar Sensory   Wrist-Dig V 2.2 2.7 20  > 8 40 Wrist-Dig V 12.5 57  > 48 32.7        Electromyography     Side Muscle Ins Act Fibs/PSW Fasc HF Amp Dur Poly Recrt Int Pat   Left APB Nml None Nml 0 Nml Nml 0 Heidy Nml   Left FDI Nml None Nml 0 Nml Nml 0 Nml Nml   Left Pronator Teres Nml None Nml 0 Nml Nml 0 Nml Nml   Left Biceps Nml None Nml 0 Nml Nml 0 Nml Nml   Left Deltoid Nml None Nml 0 Nml Nml 0 Nml Nml   Left Triceps Nml None Nml 0 Nml Nml 0 Nml Nml   Right APB Nml None Nml 0 Nml Nml 0 Nml Nml   Right FDI Nml None Nml 0 Nml Nml 0 Nml Nml         NCS Waveforms:    Motor                    Sensory                  Ultrasound Images:

## 2022-09-22 ENCOUNTER — TELEPHONE (OUTPATIENT)
Dept: ORTHOPEDICS | Facility: CLINIC | Age: 47
End: 2022-09-22

## 2022-09-22 NOTE — TELEPHONE ENCOUNTER
Reason for Call:  Other referral    Detailed comments: patient called and has been seeing Daphne Mason at [facility].    Patient is requesting provider to refer her for Ortho Surgery on carpal tunnel of wrist.    Please contact patient.  Thank you.    Phone Number Patient can be reached at: Home number on file [phone] (home)    Best Time: any    Can we leave a detailed message on this number? YES    Call taken on 9/22/2022 at 8:38 AM by Brenda Tao

## 2022-09-26 ENCOUNTER — OFFICE VISIT (OUTPATIENT)
Dept: OPHTHALMOLOGY | Facility: CLINIC | Age: 47
End: 2022-09-26
Payer: COMMERCIAL

## 2022-09-26 DIAGNOSIS — H04.123 DRY EYES: ICD-10-CM

## 2022-09-26 DIAGNOSIS — Z01.00 EXAMINATION OF EYES AND VISION: Primary | ICD-10-CM

## 2022-09-26 DIAGNOSIS — H52.4 PRESBYOPIA: ICD-10-CM

## 2022-09-26 PROCEDURE — 92015 DETERMINE REFRACTIVE STATE: CPT | Performed by: OPHTHALMOLOGY

## 2022-09-26 PROCEDURE — 92004 COMPRE OPH EXAM NEW PT 1/>: CPT | Performed by: OPHTHALMOLOGY

## 2022-09-26 RX ORDER — POLYETHYLENE GLYCOL 400 AND PROPYLENE GLYCOL 4; 3 MG/ML; MG/ML
1 SOLUTION/ DROPS OPHTHALMIC 4 TIMES DAILY PRN
Qty: 15 ML | Refills: 4 | Status: SHIPPED | OUTPATIENT
Start: 2022-09-26 | End: 2022-12-30

## 2022-09-26 ASSESSMENT — REFRACTION_WEARINGRX
OS_ADD: +2.00
OS_CYLINDER: SPHERE
OS_SPHERE: +1.50
OD_CYLINDER: +1.25
OD_AXIS: 072
OD_SPHERE: +1.25
OD_ADD: +2.00

## 2022-09-26 ASSESSMENT — CUP TO DISC RATIO
OD_RATIO: 0.1
OS_RATIO: 0.1

## 2022-09-26 ASSESSMENT — SLIT LAMP EXAM - LIDS
COMMENTS: NORMAL
COMMENTS: NORMAL

## 2022-09-26 ASSESSMENT — REFRACTION_MANIFEST
OD_AXIS: 072
OD_SPHERE: +1.50
OS_SPHERE: +1.75
OD_ADD: +2.25
OD_CYLINDER: +1.25
OS_ADD: +2.25
OS_CYLINDER: SPHERE

## 2022-09-26 ASSESSMENT — CONF VISUAL FIELD
OD_NORMAL: 1
OS_NORMAL: 1

## 2022-09-26 ASSESSMENT — VISUAL ACUITY
OD_CC: 20/30
METHOD: SNELLEN - LINEAR
OS_CC+: -1
OS_CC: 20/30
CORRECTION_TYPE: GLASSES

## 2022-09-26 ASSESSMENT — EXTERNAL EXAM - RIGHT EYE: OD_EXAM: NORMAL

## 2022-09-26 ASSESSMENT — TONOMETRY
OD_IOP_MMHG: 14
IOP_METHOD: APPLANATION
OS_IOP_MMHG: 14

## 2022-09-26 ASSESSMENT — EXTERNAL EXAM - LEFT EYE: OS_EXAM: NORMAL

## 2022-09-26 NOTE — LETTER
"    9/26/2022         RE: Cristobal Diggs  [address] [address]        Dear Colleague,    Thank you for referring your patient, Cristobal Diggs, to the [facility]. Please see a copy of my visit note below.     Current Eye Medications:  None     Subjective:  Here for complete eye exam. Vision is stable at distance and near in both eyes.   No eye pain or discomfort. Patient would like new glasses that turn dark in the sun.      Objective:  See Ophthalmology Exam.       Assessment:  Baseline eye exam in patient with hyperopia/presbyopia.      ICD-10-CM    1. Examination of eyes and vision  Z01.00       2. Presbyopia  H52.4 EYE EXAM (SIMPLE-NONBILLABLE)     REFRACTION      3. Dry eyes  H04.123 polyethylene glycol-propylene glycol (SYSTANE) 0.4-0.3 % SOLN ophthalmic solution           Plan:  Glasses prescription given - optional  May use artificial tears up to four times a day (like Refresh Optive, Systane Balance, TheraTears, or generic artificial tears are ok. Avoid \"get the red out\" drops).   Call in May 2024 for an appointment in September 2024 for Complete Exam    Dr. Warner [phone]          Again, thank you for allowing me to participate in the care of your patient.        Sincerely,        Ludwin Warner MD    "

## 2022-09-26 NOTE — PROGRESS NOTES
" Current Eye Medications:  None     Subjective:  Here for complete eye exam. Vision is stable at distance and near in both eyes.   No eye pain or discomfort. Patient would like new glasses that turn dark in the sun.      Objective:  See Ophthalmology Exam.       Assessment:  Baseline eye exam in patient with hyperopia/presbyopia.      ICD-10-CM    1. Examination of eyes and vision  Z01.00       2. Presbyopia  H52.4 EYE EXAM (SIMPLE-NONBILLABLE)     REFRACTION      3. Dry eyes  H04.123 polyethylene glycol-propylene glycol (SYSTANE) 0.4-0.3 % SOLN ophthalmic solution           Plan:  Glasses prescription given - optional  May use artificial tears up to four times a day (like Refresh Optive, Systane Balance, TheraTears, or generic artificial tears are ok. Avoid \"get the red out\" drops).   Call in May 2024 for an appointment in September 2024 for Complete Exam    Dr. Warner [phone]      "

## 2022-09-26 NOTE — PATIENT INSTRUCTIONS
"Glasses prescription given - optional  May use artificial tears up to four times a day (like Refresh Optive, Systane Balance, TheraTears, or generic artificial tears are ok. Avoid \"get the red out\" drops).   Call in May 2024 for an appointment in September 2024 for Complete Exam    Dr. Warner [phone]   "

## 2022-09-29 NOTE — TELEPHONE ENCOUNTER
Called pt and left vm with ortho con number. Pt last seen by dr alex in 7/2021. Unless her insurance dictates a new referral she can schedule.    Melinda WOLF RN Specialty Triage 9/29/2022 3:51 PM

## 2022-09-30 DIAGNOSIS — E63.9 DIETARY DEFICIENCY: ICD-10-CM

## 2022-10-01 RX ORDER — MULTIVITAMIN WITH FOLIC ACID 400 MCG
TABLET ORAL
Qty: 90 TABLET | Refills: 1 | OUTPATIENT
Start: 2022-10-01

## 2022-10-05 ENCOUNTER — APPOINTMENT (OUTPATIENT)
Dept: OPTOMETRY | Facility: CLINIC | Age: 47
End: 2022-10-05
Payer: COMMERCIAL

## 2022-10-05 PROCEDURE — 92341 FIT SPECTACLES BIFOCAL: CPT | Performed by: OPTOMETRIST
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
## 2022-10-26 ENCOUNTER — OFFICE VISIT (OUTPATIENT)
Dept: ORTHOPEDICS | Facility: CLINIC | Age: 47
End: 2022-10-26
Payer: COMMERCIAL

## 2022-10-26 VITALS
HEIGHT: 60 IN | BODY MASS INDEX: 33.41 KG/M2 | WEIGHT: 170.2 LBS | HEART RATE: 78 BPM | DIASTOLIC BLOOD PRESSURE: 67 MMHG | SYSTOLIC BLOOD PRESSURE: 105 MMHG

## 2022-10-26 DIAGNOSIS — G56.03 BILATERAL CARPAL TUNNEL SYNDROME: Primary | ICD-10-CM

## 2022-10-26 PROCEDURE — 99213 OFFICE O/P EST LOW 20 MIN: CPT | Performed by: ORTHOPAEDIC SURGERY

## 2022-10-26 ASSESSMENT — PAIN SCALES - GENERAL: PAINLEVEL: MODERATE PAIN (4)
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
## 2022-10-26 NOTE — TELEPHONE ENCOUNTER
Prior Authorization Retail Medication Request    Medication/Dose: sertraline (ZOLOFT) 25 MG tablet  ICD code (if different than what is on RX):    Previously Tried and Failed:    Rationale:  PA is Needed.    Insurance Name:    Insurance ID:        Pharmacy Information (if different than what is on RX)  Name:    Phone:      Chelsie Pittman CMA       Sunscreen Recommendations: Discussed sunblock should be applied to exposed areas daily, and be reapplied every two hours while exposed . The sunblock should be broad spectrum SPF-50, UVA/UVB and contain Zinc and Titanium Dioxide(Blue Lizard & Jason Favor Bum are some good OTC brands). Strongly recommend Elta MD products. Recommend sun protective clothing such as long sleeve UPF protective shirts, wide brim hats, neck covers and sunglasses as it has been proven to prevent further photo damage from the sun. Detail Level: Zone Sunscreen Recommendations: Discussed sunblock should be applied to exposed areas daily, and be reapplied every two hours while exposed . The sunblock should be broad spectrum SPF-50, UVA/UVB and contain Zinc and Titanium Dioxide(Blue Lizard & Charu Bowl Bum are some good OTC brands). Strongly recommend Elta MD products. Recommend sun protective clothing such as long sleeve UPF protective shirts, wide brim hats, neck covers and sunglasses as it has been proven to prevent further photo damage from the sun.

## 2022-10-27 ENCOUNTER — TELEPHONE (OUTPATIENT)
Dept: SURGERY | Facility: CLINIC | Age: 47
End: 2022-10-27

## 2022-11-10 NOTE — TELEPHONE ENCOUNTER
Type of surgery: RELEASE, CARPAL TUNNEL, LEFT (Left)   CPT 35481   Bilateral carpal tunnel syndrome G56.03    Location of surgery: [facility]  Date and time of surgery: 12-29-22  Surgeon: Daphne  Pre-Op Appt Date: TBD per patient at [facility]  Post-Op Appt Date: 1-11-23   Packet sent out: Yes  Pre-cert/Authorization completed:  No prior auth required per [employer] online list.    Date: 11/10/22    Elyse Alva  Financial Securing/Prior Auth Dept  [phone]

## 2022-11-11 NOTE — TELEPHONE ENCOUNTER
Patient has canceled surgery for 12/29/2022 and rescheduled surgery for 12/22/2022 [facility]  Patient will schedule preop with [facility]  Postop rescheduled to 01/05/2023

## 2022-11-28 ENCOUNTER — TELEPHONE (OUTPATIENT)
Dept: SURGERY | Facility: CLINIC | Age: 47
End: 2022-11-28

## 2022-11-28 NOTE — TELEPHONE ENCOUNTER
Called and left a  for patient. I asked her to call us back to discuss. I left a call back number.  Shawanda Conklin Certified Medical Assistant

## 2022-12-15 RX ORDER — DULOXETIN HYDROCHLORIDE 20 MG/1
20 CAPSULE, DELAYED RELEASE ORAL
COMMUNITY
Start: 2022-07-10

## 2022-12-21 ENCOUNTER — ANESTHESIA EVENT (OUTPATIENT)
Dept: SURGERY | Facility: AMBULATORY SURGERY CENTER | Age: 47
End: 2022-12-21
Payer: COMMERCIAL

## 2022-12-22 ENCOUNTER — HOSPITAL ENCOUNTER (OUTPATIENT)
Facility: AMBULATORY SURGERY CENTER | Age: 47
Discharge: HOME | End: 2022-12-22
Attending: ORTHOPAEDIC SURGERY | Admitting: ORTHOPAEDIC SURGERY
Payer: COMMERCIAL

## 2022-12-22 ENCOUNTER — ANESTHESIA (OUTPATIENT)
Dept: SURGERY | Facility: AMBULATORY SURGERY CENTER | Age: 47
End: 2022-12-22
Payer: COMMERCIAL

## 2022-12-22 VITALS
OXYGEN SATURATION: 97 % | WEIGHT: 165 LBS | DIASTOLIC BLOOD PRESSURE: 82 MMHG | HEART RATE: 75 BPM | RESPIRATION RATE: 16 BRPM | HEIGHT: 60 IN | BODY MASS INDEX: 32.39 KG/M2 | TEMPERATURE: 98 F | SYSTOLIC BLOOD PRESSURE: 134 MMHG

## 2022-12-22 DIAGNOSIS — G56.02 LEFT CARPAL TUNNEL SYNDROME: Primary | ICD-10-CM

## 2022-12-22 LAB — HCG UR QL: NEGATIVE

## 2022-12-22 PROCEDURE — G8916 PT W IV AB GIVEN ON TIME: HCPCS

## 2022-12-22 PROCEDURE — 81025 URINE PREGNANCY TEST: CPT | Performed by: ANESTHESIOLOGY

## 2022-12-22 PROCEDURE — 64721 CARPAL TUNNEL SURGERY: CPT | Mod: LT | Performed by: ORTHOPAEDIC SURGERY

## 2022-12-22 PROCEDURE — 64721 CARPAL TUNNEL SURGERY: CPT | Mod: LT

## 2022-12-22 PROCEDURE — G8907 PT DOC NO EVENTS ON DISCHARG: HCPCS

## 2022-12-22 RX ORDER — SODIUM CHLORIDE, SODIUM LACTATE, POTASSIUM CHLORIDE, CALCIUM CHLORIDE 600; 310; 30; 20 MG/100ML; MG/100ML; MG/100ML; MG/100ML
INJECTION, SOLUTION INTRAVENOUS CONTINUOUS
Status: DISCONTINUED | OUTPATIENT
Start: 2022-12-22 | End: 2022-12-23 | Stop reason: HOSPADM

## 2022-12-22 RX ORDER — FENTANYL CITRATE 50 UG/ML
25 INJECTION, SOLUTION INTRAMUSCULAR; INTRAVENOUS
Status: DISCONTINUED | OUTPATIENT
Start: 2022-12-22 | End: 2022-12-23 | Stop reason: HOSPADM

## 2022-12-22 RX ORDER — METOPROLOL TARTRATE 1 MG/ML
1-2 INJECTION, SOLUTION INTRAVENOUS EVERY 5 MIN PRN
Status: DISCONTINUED | OUTPATIENT
Start: 2022-12-22 | End: 2022-12-23 | Stop reason: HOSPADM

## 2022-12-22 RX ORDER — LIDOCAINE 40 MG/G
CREAM TOPICAL
Status: DISCONTINUED | OUTPATIENT
Start: 2022-12-22 | End: 2022-12-23 | Stop reason: HOSPADM

## 2022-12-22 RX ORDER — HYDROCODONE BITARTRATE AND ACETAMINOPHEN 5; 325 MG/1; MG/1
1-2 TABLET ORAL EVERY 6 HOURS PRN
Qty: 8 TABLET | Refills: 0 | Status: SHIPPED | OUTPATIENT
Start: 2022-12-22 | End: 2023-02-15

## 2022-12-22 RX ORDER — FENTANYL CITRATE 50 UG/ML
50 INJECTION, SOLUTION INTRAMUSCULAR; INTRAVENOUS EVERY 5 MIN PRN
Status: DISCONTINUED | OUTPATIENT
Start: 2022-12-22 | End: 2022-12-23 | Stop reason: HOSPADM

## 2022-12-22 RX ORDER — ONDANSETRON 2 MG/ML
INJECTION INTRAMUSCULAR; INTRAVENOUS PRN
Status: DISCONTINUED | OUTPATIENT
Start: 2022-12-22 | End: 2022-12-22

## 2022-12-22 RX ORDER — PROPOFOL 10 MG/ML
INJECTION, EMULSION INTRAVENOUS CONTINUOUS PRN
Status: DISCONTINUED | OUTPATIENT
Start: 2022-12-22 | End: 2022-12-22

## 2022-12-22 RX ORDER — LIDOCAINE HYDROCHLORIDE 20 MG/ML
INJECTION, SOLUTION INFILTRATION; PERINEURAL PRN
Status: DISCONTINUED | OUTPATIENT
Start: 2022-12-22 | End: 2022-12-22

## 2022-12-22 RX ORDER — FENTANYL CITRATE 50 UG/ML
INJECTION, SOLUTION INTRAMUSCULAR; INTRAVENOUS PRN
Status: DISCONTINUED | OUTPATIENT
Start: 2022-12-22 | End: 2022-12-22

## 2022-12-22 RX ORDER — ONDANSETRON 2 MG/ML
4 INJECTION INTRAMUSCULAR; INTRAVENOUS EVERY 30 MIN PRN
Status: DISCONTINUED | OUTPATIENT
Start: 2022-12-22 | End: 2022-12-23 | Stop reason: HOSPADM

## 2022-12-22 RX ORDER — OXYCODONE HYDROCHLORIDE 5 MG/1
5 TABLET ORAL
Status: DISCONTINUED | OUTPATIENT
Start: 2022-12-22 | End: 2022-12-23 | Stop reason: HOSPADM

## 2022-12-22 RX ORDER — ACETAMINOPHEN 325 MG/1
975 TABLET ORAL ONCE
Status: COMPLETED | OUTPATIENT
Start: 2022-12-22 | End: 2022-12-22

## 2022-12-22 RX ORDER — CEFAZOLIN SODIUM 2 G/100ML
2 INJECTION, SOLUTION INTRAVENOUS
Status: COMPLETED | OUTPATIENT
Start: 2022-12-22 | End: 2022-12-22

## 2022-12-22 RX ORDER — ONDANSETRON 4 MG/1
4 TABLET, ORALLY DISINTEGRATING ORAL EVERY 30 MIN PRN
Status: DISCONTINUED | OUTPATIENT
Start: 2022-12-22 | End: 2022-12-23 | Stop reason: HOSPADM

## 2022-12-22 RX ORDER — MEPERIDINE HYDROCHLORIDE 25 MG/ML
12.5 INJECTION INTRAMUSCULAR; INTRAVENOUS; SUBCUTANEOUS
Status: DISCONTINUED | OUTPATIENT
Start: 2022-12-22 | End: 2022-12-23 | Stop reason: HOSPADM

## 2022-12-22 RX ORDER — AMOXICILLIN 250 MG
1-2 CAPSULE ORAL 2 TIMES DAILY
Qty: 30 TABLET | Refills: 0 | Status: SHIPPED | OUTPATIENT
Start: 2022-12-22

## 2022-12-22 RX ORDER — HYDROXYZINE HYDROCHLORIDE 25 MG/1
25 TABLET, FILM COATED ORAL
Status: DISCONTINUED | OUTPATIENT
Start: 2022-12-22 | End: 2022-12-23 | Stop reason: HOSPADM

## 2022-12-22 RX ORDER — ONDANSETRON 4 MG/1
4 TABLET, ORALLY DISINTEGRATING ORAL
Status: DISCONTINUED | OUTPATIENT
Start: 2022-12-22 | End: 2022-12-23 | Stop reason: HOSPADM

## 2022-12-22 RX ORDER — CEFAZOLIN SODIUM 2 G/100ML
2 INJECTION, SOLUTION INTRAVENOUS SEE ADMIN INSTRUCTIONS
Status: DISCONTINUED | OUTPATIENT
Start: 2022-12-22 | End: 2022-12-23 | Stop reason: HOSPADM

## 2022-12-22 RX ORDER — FENTANYL CITRATE 50 UG/ML
25 INJECTION, SOLUTION INTRAMUSCULAR; INTRAVENOUS EVERY 5 MIN PRN
Status: DISCONTINUED | OUTPATIENT
Start: 2022-12-22 | End: 2022-12-23 | Stop reason: HOSPADM

## 2022-12-22 RX ADMIN — SODIUM CHLORIDE, SODIUM LACTATE, POTASSIUM CHLORIDE, CALCIUM CHLORIDE: 600; 310; 30; 20 INJECTION, SOLUTION INTRAVENOUS at 09:31

## 2022-12-22 RX ADMIN — LIDOCAINE HYDROCHLORIDE 50 MG: 20 INJECTION, SOLUTION INFILTRATION; PERINEURAL at 09:35

## 2022-12-22 RX ADMIN — ACETAMINOPHEN 975 MG: 325 TABLET ORAL at 09:19

## 2022-12-22 RX ADMIN — ONDANSETRON 4 MG: 2 INJECTION INTRAMUSCULAR; INTRAVENOUS at 09:46

## 2022-12-22 RX ADMIN — PROPOFOL 150 MCG/KG/MIN: 10 INJECTION, EMULSION INTRAVENOUS at 09:37

## 2022-12-22 RX ADMIN — FENTANYL CITRATE 50 MCG: 50 INJECTION, SOLUTION INTRAMUSCULAR; INTRAVENOUS at 09:43

## 2022-12-22 RX ADMIN — CEFAZOLIN SODIUM 2 G: 2 INJECTION, SOLUTION INTRAVENOUS at 09:31

## 2022-12-22 NOTE — BRIEF OP NOTE
[facility] Brief Operative Note    Pre-operative diagnosis: Bilateral carpal tunnel syndrome [G56.03]   Post-operative diagnosis left carpal tunnel syndrome     Procedure: Procedure(s):  RELEASE, CARPAL TUNNEL, LEFT   Surgeon(s): Surgeon(s) and Role:     * Diego Serna MD - Primary     * West Bowers PA - Assisting   Estimated blood loss: 1mL    Specimens: * No specimens in log *   Findings: Thick TCL

## 2022-12-22 NOTE — OP NOTE
OPERATIVE REPORT    DATE OF SERVICE:  12/22/2022      PREOPERATIVE DIAGNOSIS  Left carpal tunnel syndrome.      POSTOPERATIVE DIAGNOSIS  Left carpal tunnel syndrome.      NAME OF OPERATION  Open left carpal tunnel release.     SURGEON  Diego Serna MD     FIRST ASSISTANT  West Bowers PA-C     ANESTHESIA: MAC with local.    ANTIBIOTICS: cefazolin 2g iv, prior to incision    IVF: 200 ml LR    ESTIMATED BLOOD LOSS: 1mL    FINDINGS: thick transverse carpal ligament, flattened median nerve, hyperemic changes of median nerve.    Tourniquet time: 5 minutes at 200 mmHg.    Complications: None apparent.    SPECIMENS: none    DRAINS: none    IMPLANTS: none      INDICATIONS  Cristobal Diggs is a 47 year old female with moderate-severe left carpal tunnel syndrome, confirmed on EMG. The symptoms have been quite bothersome.  Conservative treatment has failed, including night splints, therapy, NSAIDs and injections. Risks of surgery, including but not limited to: bleeding, infection, pain, scar, damage to adjacent structures (nerves, vessels), temporary vs permanent nerve damage, failure to relieve symptoms, recurrence of symptoms, need for further surgery, risks of anesthesia, and death were discussed. All questions were addressed to patient satisfaction. The patient elected to proceed with this surgery understanding the risks and perceived benefits. Informed consent was obtained for the procedure.       PROCEDURE IN DETAIL  The patient was identified in the preoperative holding area. The correct procedure and procedural site was confirmed with the patient. Consent was reviewed. The correct surgical site was marked with an indelible marker by the attending surgeon, Diego eSrna MD.  The patient was then taken to the operating room and placed on the operating table in the supine position.  Tourniquet was placed around the proximal arm. All bony prominences well padded. The limb was prepped and draped in the usual sterile  fashion.  IV sedation was given by Anesthesia.  Local anesthetic using a combination of 0.25% Marcaine and 1% lidocaine was injected in the anticipated incision site.  The limb was exsanguinated and tourniquet inflated to 200 mmHg.    A longitudinal incision was made in the usual location at the base of the palm just ulnar to midline and the palmaris longus.  The incision was taken down through the skin and subcutaneous tissue carefully to the level of the palmar fascia.  The palmar fascia was then carefully incised, and the transverse carpal ligament was then exposed. The transverse carpal ligament was incised from distal to proximal under direct vision while protecting underlying structures with an elevator.  The transverse carpal ligament was tight and thick.  Proximally, we protected the underlying structures using a clamp, and then completed the ligament incision using a tenotomy scissors.  The distal volar forearm fascia was also incised longitudinally for a segment of approximately 3cm. The median nerve was completely exposed and was noted to have flattened, hyperemic appearance. Once I felt that the transverse carpal ligament was released completely, I used my small finger as a probe to make sure that it was in fact completely released, as it was. The wound was copiously irrigated with normal saline and the tourniquet deflated.  Hemostasis was achieved with electrocautery.  Then, 4-0 nylon sutures placed in a horizontal mattress fashion were used to close the skin, and a sterile dressing was applied followed by a volar splint. The patient was then transferred to the hospital cart and to the recovery area in stable condition. There were no apparent complications.    Postop plan will be partial weight-bearing of left upper extremity. Splint to be in place at all times until follow-up in 10-14 days. Oral pain medications (norco) will be provided. Elevation of left upper extremity at all times to reduce  swelling. Finger range of motion.    Diego Serna M.D., M.S.  Dept. of Orthopaedic Surgery  [facility]

## 2022-12-22 NOTE — ANESTHESIA CARE TRANSFER NOTE
Patient: Cristobal Diggs    Procedure: Procedure(s):  RELEASE, CARPAL TUNNEL, LEFT       Diagnosis: Bilateral carpal tunnel syndrome [G56.03]  Diagnosis Additional Information: No value filed.    Anesthesia Type:   MAC     Note:      Level of Consciousness: awake  Oxygen Supplementation: face mask  Level of Supplemental Oxygen (L/min / FiO2): 6  Independent Airway: airway patency satisfactory and stable  Dentition: dentition unchanged  Vital Signs Stable: post-procedure vital signs reviewed and stable  Report to RN Given: handoff report given  Patient transferred to: PACU  Comments: Anesthesia Care Transfer Note    Patient: Cristobal Diggs    Transferred to: PACU with supplemental O2    Patient vital signs: stable    Airway: none    Monitors on, VSS, breathing spontaneously, report to RN      Leda Whittaker CRNA   12/22/2022 10:10 AM  Handoff Report: Identifed the Patient, Identified the Reponsible Provider, Reviewed the pertinent medical history, Discussed the surgical course, Reviewed Intra-OP anesthesia mangement and issues during anesthesia, Set expectations for post-procedure period and Allowed opportunity for questions and acknowledgement of understanding      Vitals:  Vitals Value Taken Time   BP     Temp     Pulse     Resp     SpO2         Electronically Signed By: FRANCES Olivera CRNA  December 22, 2022  10:09 AM

## 2022-12-22 NOTE — H&P
"The History and Physical on patient's chart ([facility], 2022) was personally reviewed today with the patient. there have been no interval changes in patient's history since H+P performed.    History:  Cristobal Darnell is a 47 year old female , Right -hand dominant who is seen in for Bilateral hand numbness and tingling, pain and weakness x 20+ years.  The symptoms have been constant, and helped by a splint and NSAIDs. Left more than right . Had bilateral carpal tunnel injections 2022 which worked well but only a few months.  shes taking over the counter pain medications now. The numbness feels like its going up her arms. She'll have pain up her arms to her shoulders. At night, she statres her hand/fingers feel like they want to \"explode\". She'd like to discuss surgery, left side first.     She has numbness and tingling in all digits.  Other symptoms include pain radiating up arm to the neck on the right and up to the elbow and occasionally shoulder on the left. Also right elbow and right shoulder pain, right wrist pain.Thinks started when she was first pregnant with her daughter 23 years ago. Symptoms aggravated with driving, in the morning, or with activities using her hands, worse at night. Hand and fingers feel swollen in the morning.      Has had previous injections 3/2019, 2020, 2020, 2021, 2022.     Has neck pain as well.    EM2022 [facility]:  Bilateral median neuropathies at the wrist consistent with carpal tunnel syndrome, moderate-sever on the left, moderate on the right. No evidence of ulnar mononeuropathy in either extremity. No evidence of left upper extremity radiculopathy.         ASSESSMENT/PLAN: 48yo RHD female with bilateral hand pain, numbness and tingling likely carpal tunnel syndrome. Cannot rule out neck etiology.     * discussed with patient signs and symptoms are somewhat consistent with carpal tunnel syndrome. Carpal tunnel syndrome is compression " or pinching of the median nerve at the wrist, as it enters the hand. There are many different causes, and in most cases, multifactorial.     * An indepth discussion was had with her about the options for treatment, which included activity modification to avoid aggravating activities, taking breaks during activities that cause symptoms, stretching, NSAIDS to help decrease inflammation and swelling within the carpal tunnel, night splinting, corticosteroid injections, and carpal tunnel release.   * depending upon severity and duration of symptoms, nonoperative treatment is usually initiated, starting with least invasive modalities such as activity modification and a trial of night splints and NSAIDs.  * Cortisone injections are considered to decrease swelling and inflammation within the carpal tunnel and compression of the nerve.   * Lastly, carpal tunnel release should symptoms persist despite trial of nonoperative treatment, or in cases of severe carpal tunnel syndrome.  * risks of surgery, including, but not limited to: bleeding, infection, pain, scar, damage to adjacent structures (nerves, vessels, tendons), temporary versus permanent nerve injury, failure to relieve symptoms, recurrence of symptoms, incomplete release, stiffness, scar sensitivity and tenderness, need for further surgery, risks of anesthesia were discussed.  * in some cases, with severe, prolonged symptoms, or in situations of underlying peripheral neuropathy, there may be permanent nerve changes not amenable to surgery, that even with surgery, may not resolve.  * in some cases, it may take 6 months to a year or longer for symptoms to improve or resolve, but even then may not completely resolve.  * cannot make certain it will alleviate symptoms up the arm to the shoulder and neck area, only in the hand, if truly carpal tunnel syndrome and nothing else.     * at this time, will proceed with left carpal tunnel release. Outpatient mac with local  anesthesia.    Patient elects to proceed with planned procedure. Left carpal tunnel release.    Risks and perceived benefits of surgery again discussed with patient. Patient's questions addressed and answered. Written informed consent obtained and reviewed. Surgical site marked with indelible marker with patient's participation after confirming site with patient.      Diego Serna M.D., M.S.  Dept. of Orthopaedic Surgery  [facility]

## 2022-12-22 NOTE — ANESTHESIA POSTPROCEDURE EVALUATION
Patient: Cristobal Diggs    Procedure: Procedure(s):  RELEASE, CARPAL TUNNEL, LEFT       Anesthesia Type:  MAC    Note:  Disposition: Outpatient   Postop Pain Control: Uneventful            Sign Out: Well controlled pain   PONV: No   Neuro/Psych: Uneventful            Sign Out: Acceptable/Baseline neuro status   Airway/Respiratory: Uneventful            Sign Out: Acceptable/Baseline resp. status   CV/Hemodynamics: Uneventful            Sign Out: Acceptable CV status; No obvious hypovolemia; No obvious fluid overload   Other NRE:    DID A NON-ROUTINE EVENT OCCUR? No           Last vitals:  Vitals Value Taken Time   /82 12/22/22 1025   Temp 98  F (36.7  C) 12/22/22 1025   Pulse 79 12/22/22 1015   Resp 16 12/22/22 1025   SpO2 97 % 12/22/22 1029   Vitals shown include unvalidated device data.    Electronically Signed By: Bill Franz MD  December 22, 2022  10:49 AM
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
## 2022-12-22 NOTE — DISCHARGE INSTRUCTIONS
[facility]   Adult Discharge Orders & Instructions     For 24 hours after surgery    Get plenty of rest.  A responsible adult must stay with you for at least 24 hours after you leave the hospital.   Do not drive or use heavy equipment.  If you have weakness or tingling, don't drive or use heavy equipment until this feeling goes away.  Do not drink alcohol.  Avoid strenuous or risky activities.  Ask for help when climbing stairs.   You may feel lightheaded.  IF so, sit for a few minutes before standing.  Have someone help you get up.   If you have nausea (feel sick to your stomach): Drink only clear liquids such as apple juice, ginger ale, broth or 7-Up.  Rest may also help.  Be sure to drink enough fluids.  Move to a regular diet as you feel able.  You may have a slight fever. Call the doctor if your fever is over 100 F (37.7 C) (taken under the tongue) or lasts longer than 24 hours.  You may have a dry mouth, a sore throat, muscle aches or trouble sleeping.  These should go away after 24 hours.  Do not make important or legal decisions.     Call your doctor for any of the followin.  Signs of infection (fever, growing tenderness at the surgery site, a large amount of drainage or bleeding, severe pain, foul-smelling drainage, redness, swelling).    2. It has been over 8 to 10 hours since surgery and you are still not able to urinate (pass water).    3.  Headache for over 24 hours.    4.  Numbness, tingling or weakness the day after surgery (if you had spinal anesthesia).                  5. Signs of Covid-19 infection (temperature over 100 degrees, shortness of breath, cough, loss of taste/smell, generalized body aches, persistent headache,                  chills, sore throat, nausea/vomiting/diarrhea).    To contact Dr Serna call:  [phone]    ________________________________________    Name: Cristobal Diggs MRN #: [id number]  Date: 2022  Procedure: left open carpal tunnel  release.  Discharge to home when stable, tolerating clear liquids, and patient has urinated  Call for follow-up appointment, [phone], with Dr. Serna in: 2 weeks   WOUND CARE  The bandage may be slightly bloody. This is normal.  Ice:  Keep an ice bag on your hand for 20 minutes at a time.  Keep incisions clean and dry following surgery for:  Until follow-up   Change all bandages in:  Not until follow-up     if the splint becomes wet, dirty or uncomfortable, ok to remove and cover wound with dry gauze, brace.  If bandages are changed before follow-up, cover all incisions with fresh bandages or bandaids.  O.K. to shower (may get incision wet) in:  not until wound healed and sutures removed.  No tub baths, swimming pools, hot tubs, etc. for a minimum of 2 weeks following surgery  ACTIVITY  Keep hand elevated above heart at all times for the first few days after surgery, then as much as possible.  Weight-bearing ([place]):  Partial weight-bearing 30%   in splint.  Exercises:  Perform exercises 3 times a day for a minimum of 25 reps each time (start today or tomorrow):             Finger range of motion   OK to drive:  Not for 24 hours  When going back to driving, be sure to test braking/acceleration maneuvers in an empty parking lot before entry into any traffic areas.    ABSOLUTELY NO DRIVING WHILE TAKING NARCOTICS!    DISCHARGE MEDICATIONS:   Norco (5/325), 1 to 2 tablets, take every   hours as needed for pain  Other: stool softeners while on pain medications  Ok to take over the counter pain medications such as ibuprofen or acetaminophen as needed.  Strong pain medication has been prescribed. Use as directed. Do not combine with alcohol. Be careful as you walk or climb stairs.   DIET:  If no nausea, clear liquids should be taken initially.  Then progress to solid foods when clear liquids are tolerated.   RESPONSE TO SURGERY: It is normal to have pain and swelling in your hand after surgery. It may take 4 weeks  or longer for the swelling to go away. It is also common to notice some bruising around the hand, wrist and forearm as the swelling resolves.  EMERGENCY: Call or return for any fevers (temperature greater than 101.5   or sustained fevers greater than 100.5   that haven t resolved within 3 to 4 days following surgery) or chills, increasing pain, swelling, redness, drainage (especially if yellow, green, or foul smelling), excessive bleeding), chest pain, shortness of breath:  Phone #: [phone]; If emergency, go to local ER or dial 911.    Diego Serna M.D., M.S.  Dept. of Orthopaedic Surgery  [facility]      12/22/2022      Exercises should be performed at least 3 times per day. Move in pain-free range.           Finger Exercises    Perform 1 set of 20 reps, each exercise, 3 times a day  Perform 1 rep every 4 seconds  Rest 1 minute between sets                                  [facility]  Same-Day Surgery   Adult Discharge Orders & Instructions   For 24 hours after surgery  Get plenty of rest.  A responsible adult must stay with you for at least 24 hours after you leave the hospital.   Do not drive or use heavy equipment.  If you have weakness or tingling, don't drive or use heavy equipment until this feeling goes away.  Do not drink alcohol.  Avoid strenuous or risky activities.  Ask for help when climbing stairs.   You may feel lightheaded.  IF so, sit for a few minutes before standing.  Have someone help you get up.   If you have nausea (feel sick to your stomach): Drink only clear liquids such as apple juice, ginger ale, broth or 7-Up.  Rest may also help.  Be sure to drink enough fluids.  Move to a regular diet as you feel able.  You may have a slight fever. Call the doctor if your fever is over 100 F (37.7 C) (taken under the tongue) or lasts longer than 24 hours.  You may have a dry mouth, a sore throat, muscle aches or trouble sleeping.  These should go away  after 24 hours.  Do not make important or legal decisions.   Call your doctor for any of the followin.  Signs of infection (fever, growing tenderness at the surgery site, a large amount of drainage or bleeding, severe pain, foul-smelling drainage, redness, swelling).  2. It has been over 8 to 10 hours since surgery and you are still not able to urinate (pass water).  3.  Headache for over 24 hours.  4.  Numbness, tingling or weakness the day after surgery (if you had spinal anesthesia).
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
## 2022-12-29 ENCOUNTER — TELEPHONE (OUTPATIENT)
Dept: ORTHOPEDICS | Facility: CLINIC | Age: 47
End: 2022-12-29

## 2022-12-29 NOTE — TELEPHONE ENCOUNTER
[facility] Call Center    Phone Message    May a detailed message be left on voicemail: yes     Reason for Call: Other: Patient is coming if for a post op on her left hand on 01/05 however she is wondering if she can also get an injection to her right hand at the same appointment. Please contact patient back.     Action Taken: Other: Orthopedics    Travel Screening: Not Applicable

## 2022-12-29 NOTE — TELEPHONE ENCOUNTER
Called and spoke to patient. I let her know that it should be fine. She does not have surgery set up for her right hand. She wants to see the results of her left hand first. She thanked me for calling.  Shawanda Conklin Certified Medical Assistant

## 2022-12-30 DIAGNOSIS — H04.123 DRY EYES: ICD-10-CM

## 2022-12-30 RX ORDER — POLYETHYLENE GLYCOL 400 AND PROPYLENE GLYCOL 4; 3 MG/ML; MG/ML
1 SOLUTION/ DROPS OPHTHALMIC 4 TIMES DAILY PRN
Qty: 15 ML | Refills: 4 | Status: SHIPPED | OUTPATIENT
Start: 2022-12-30 | End: 2024-01-05

## 2023-01-02 ENCOUNTER — TELEPHONE (OUTPATIENT)
Dept: SURGERY | Facility: CLINIC | Age: 48
End: 2023-01-02
Payer: COMMERCIAL

## 2023-01-02 DIAGNOSIS — G56.01 RIGHT CARPAL TUNNEL SYNDROME: Primary | ICD-10-CM

## 2023-01-02 NOTE — TELEPHONE ENCOUNTER
Patient has a postop for her Left wrist scheduled on 01/05/2023 she wants to speak with Dr. Brooks first before she schedules surgery. She will call us back to schedule [phone]

## 2023-01-05 ENCOUNTER — HOSPITAL ENCOUNTER (OUTPATIENT)
Facility: AMBULATORY SURGERY CENTER | Age: 48
End: 2023-01-05
Attending: ORTHOPAEDIC SURGERY | Admitting: ORTHOPAEDIC SURGERY
Payer: COMMERCIAL

## 2023-01-05 ENCOUNTER — OFFICE VISIT (OUTPATIENT)
Dept: ORTHOPEDICS | Facility: CLINIC | Age: 48
End: 2023-01-05
Payer: COMMERCIAL

## 2023-01-05 VITALS — HEIGHT: 60 IN | BODY MASS INDEX: 33.38 KG/M2 | WEIGHT: 170 LBS

## 2023-01-05 DIAGNOSIS — Z98.890 S/P CARPAL TUNNEL RELEASE: Primary | ICD-10-CM

## 2023-01-05 PROCEDURE — 99024 POSTOP FOLLOW-UP VISIT: CPT | Performed by: ORTHOPAEDIC SURGERY

## 2023-01-05 ASSESSMENT — PAIN SCALES - GENERAL: PAINLEVEL: NO PAIN (0)

## 2023-01-05 NOTE — TELEPHONE ENCOUNTER
Type of surgery: RELEASE, CARPAL TUNNEL (Right)   CPT 07818  Bilateral carpal tunnel syndrome G56.03  Location of surgery: [facility]  Date and time of surgery: 01/20/2023  Surgeon: KYRA  Pre-Op Appt Date: patient will scheduled with [facility]   Post-Op Appt Date: 01/30/2023 / pt wanted a am apt    Packet sent out: Yes  Pre-cert/Authorization completed:  No prior auth required per [employer] online pa list.    Date: 1-5-23    Elyse Alva  Financial Securing/Prior Auth Dept  [phone]
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
## 2023-01-16 RX ORDER — ERGOCALCIFEROL 1.25 MG/1
CAPSULE, LIQUID FILLED ORAL
COMMUNITY
Start: 2022-12-30 | End: 2023-01-19 | Stop reason: HOSPADM

## 2023-01-16 RX ORDER — ACETAMINOPHEN 500 MG
1000 TABLET ORAL EVERY 6 HOURS
COMMUNITY
Start: 2021-03-30 | End: 2023-01-19 | Stop reason: HOSPADM

## 2023-01-18 ENCOUNTER — TELEPHONE (OUTPATIENT)
Dept: ORTHOPEDICS | Facility: CLINIC | Age: 48
End: 2023-01-18
Payer: COMMERCIAL

## 2023-01-18 NOTE — TELEPHONE ENCOUNTER
Faxed pre op from [facility] to [facility] at [phone]. Fax confirmed and sent to abstracting.   Shawanda Conklin Certified Medical Assistant

## 2023-02-03 ENCOUNTER — APPOINTMENT (OUTPATIENT)
Dept: OPTOMETRY | Facility: CLINIC | Age: 48
End: 2023-02-03
Payer: COMMERCIAL

## 2023-02-03 PROCEDURE — 92341 FIT SPECTACLES BIFOCAL: CPT | Performed by: OPTOMETRIST
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
## 2023-02-15 ENCOUNTER — OFFICE VISIT (OUTPATIENT)
Dept: ORTHOPEDICS | Facility: CLINIC | Age: 48
End: 2023-02-15
Payer: COMMERCIAL

## 2023-02-15 VITALS
HEART RATE: 88 BPM | BODY MASS INDEX: 32.67 KG/M2 | WEIGHT: 166.4 LBS | DIASTOLIC BLOOD PRESSURE: 79 MMHG | SYSTOLIC BLOOD PRESSURE: 116 MMHG | HEIGHT: 60 IN

## 2023-02-15 DIAGNOSIS — G56.01 RIGHT CARPAL TUNNEL SYNDROME: Primary | ICD-10-CM

## 2023-02-15 PROCEDURE — 20526 THER INJECTION CARP TUNNEL: CPT | Mod: 79 | Performed by: ORTHOPAEDIC SURGERY

## 2023-02-15 RX ORDER — TRIAMCINOLONE ACETONIDE 40 MG/ML
40 INJECTION, SUSPENSION INTRA-ARTICULAR; INTRAMUSCULAR
Status: DISCONTINUED | OUTPATIENT
Start: 2023-02-15 | End: 2023-08-18

## 2023-02-15 RX ORDER — LIDOCAINE HYDROCHLORIDE 10 MG/ML
1 INJECTION, SOLUTION INFILTRATION; PERINEURAL
Status: DISCONTINUED | OUTPATIENT
Start: 2023-02-15 | End: 2023-08-18

## 2023-02-15 RX ADMIN — TRIAMCINOLONE ACETONIDE 40 MG: 40 INJECTION, SUSPENSION INTRA-ARTICULAR; INTRAMUSCULAR at 10:34

## 2023-02-15 RX ADMIN — LIDOCAINE HYDROCHLORIDE 1 ML: 10 INJECTION, SOLUTION INFILTRATION; PERINEURAL at 10:34

## 2023-02-15 ASSESSMENT — PAIN SCALES - GENERAL: PAINLEVEL: MODERATE PAIN (4)
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
## 2023-03-17 ENCOUNTER — TELEPHONE (OUTPATIENT)
Dept: SURGERY | Facility: CLINIC | Age: 48
End: 2023-03-17
Payer: COMMERCIAL

## 2023-03-17 DIAGNOSIS — Z98.890 S/P CARPAL TUNNEL RELEASE: Primary | ICD-10-CM

## 2023-03-17 NOTE — TELEPHONE ENCOUNTER
[facility] Call Center    Phone Message    May a detailed message be left on voicemail: yes     Reason for Call: Order(s): Other:   Reason for requested: Phy Therapy for left wrist   Date needed: 03/20/2023    Provider name: Dr. Diego Serna    Pt asking for referral to phy therapy for left wrist.       Action Taken: Other: Dr. Diego Serna    Travel Screening: Not Applicable

## 2023-04-13 ENCOUNTER — THERAPY VISIT (OUTPATIENT)
Dept: OCCUPATIONAL THERAPY | Facility: CLINIC | Age: 48
End: 2023-04-13
Attending: ORTHOPAEDIC SURGERY
Payer: COMMERCIAL

## 2023-04-13 DIAGNOSIS — M25.632 WRIST STIFFNESS, LEFT: Primary | ICD-10-CM

## 2023-04-13 DIAGNOSIS — Z98.890 S/P CARPAL TUNNEL RELEASE: ICD-10-CM

## 2023-04-13 PROCEDURE — 97140 MANUAL THERAPY 1/> REGIONS: CPT | Mod: GO | Performed by: OCCUPATIONAL THERAPIST

## 2023-04-13 PROCEDURE — 97110 THERAPEUTIC EXERCISES: CPT | Mod: GO | Performed by: OCCUPATIONAL THERAPIST

## 2023-04-13 PROCEDURE — 97165 OT EVAL LOW COMPLEX 30 MIN: CPT | Mod: GO | Performed by: OCCUPATIONAL THERAPIST

## 2023-04-13 NOTE — PROGRESS NOTES
[facility]    OUTPATIENT Occupational Therapy ORTHOPEDIC EVALUATION  PLAN OF TREATMENT FOR OUTPATIENT REHABILITATION  (COMPLETE FOR INITIAL CLAIMS ONLY)  Patient's Last Name, First Name, M.I.  YOB: 1975  Ryne MeyerbarbCristobal COBURN    Provider s Name:  [facility]   Medical Record No.  [id number]   Start of Care Date:  04/13/23   Onset Date:  12/22/22 (L CTR)03/17/23 (therapy referral)   Treatment Diagnosis:  B CTS, s/p L CTR Medical Diagnosis:     S/P carpal tunnel release  Wrist stiffness, left       Goals:     04/13/23 0500   Goal #1   Goal #1 household chores   Previous Performance Level Independent   Current Functional Task Manipulate   Current Performance Level severe difficulty   STG Target Perfomance Other - on additional line   Other Household Chores utinsels to peel/cut/prep food   STG Target Perform Level moderate difficulty   Due Date 05/27/23   LTG Target Task/Performance Other - on additional line   Other Household Chores mild difficulty   Due Date 07/11/23         Therapy Frequency:  1 x per week  Predicted Duration of Therapy Intervention:  for 1 month tapering to 2 X a month over 2 months    Deysi Crump OT                 I CERTIFY THE NEED FOR THESE SERVICES FURNISHED UNDER        THIS PLAN OF TREATMENT AND WHILE UNDER MY CARE     (Physician attestation of this document indicates review and certification of the therapy plan).                     Certification Date From:  04/13/23   Certification Date To:  07/11/23    Referring Provider:  Diego Serna    Initial Assessment        See Epic Evaluation SOC Date: 04/13/23

## 2023-04-13 NOTE — PROGRESS NOTES
Hand Therapy Initial Evaluation    Current Date:  4/13/2023    Subjective:  Cristobal Diggs is a 47 year old right hand dominant female.    Diagnosis:   Bilateral CTS  DOS:  12/22/2022  Procedure:  Left CTR  Therapy referral: 3/17/2023    Patient reports symptoms of pain, stiffness/loss of motion, weakness/loss of strength, numbness and tingling of bilateral hands which occurred due to gradual onset over the past 20 years. Since onset symptoms are gradually getting worse.  Special tests:  EMG.  Previous treatment: several injections B CT, Left CTR, waiting on Right CTR to rehab left hand.  General health as reported by patient is fair.  Pertinent medical history includes:None  Medical allergies:see list in EMR.  Surgical history: orthopedic: left CTR.  Medication history: see list in EMR.    Occupational Profile Information:  Current occupation is   Currently working in normal job without restrictions  Job Tasks: Driving  Prior functional level:  no limitations  Barriers include:none  Mobility: No difficulty  Transportation: drives    Functional Outcome Measure:  Upper Extremity Functional Index  SCORE:   Column Totals: 44/80  (A lower score indicates greater disability.)    Objective:  Pain Level (Scale 0-10)   4/13/2023    2     Pain Description  Date 4/13/2023   Location wrist and scar   Pain Quality Tender and tight   Frequency intermittent     Pain is worst  daytime   Exacerbated by  pressure to scar   Relieved by rest   Progression Improved since surgery      Edema  None     Scar   Sensitivity: minimal Quality:  Moderately adherent volar wrist    Sensation   Decreased Median Nerve distribution per pt report improved since surgery but still notices in ring finger/RDN and palm  [facility]-Alcides Monofilament Sensory Testing:  Left hand 4/13/2023   Thumb 2.83   Index 2.83   Long 2.83   Ring RDN 2.83   Ring UDN 2.83   Small 2.83   2.83:  Normal  3.61:  Diminished light touch  4.31:  Diminished protective  sensation  4.56:  Loss of protective sensation  6.65:  Deep pressure sensation  Absent:  Tested with no response    ROM  Finger and thumb AROM WNL's  Wrist 4/13/2023 4/13/2023   AROM (PROM) Right Left   Extension 60 60   Flexion 60 50   RD 15 15   UD 40 40     Strength   (Measured in pounds)  Pain Report: - none  + mild    ++ moderate    +++ severe    4/13/2023 4/13/2023   Trials Right Left   1  2  3 36  24  22 28-  29-  32-   Average 27 30     Lat Pinch 4/13/2023 4/13/2023   Trials Right Left   1  2  3 15  14  16 17-  17-  15+   Average 15 16     3 Pt Pinch 4/13/2023 4/13/2023   Trials Right Left   1  2  3 15  15  14 11-  12-  12+   Average 15 12     Assessment:  Patient presents with symptoms consistent with diagnosis of bilateral hand Carpal Tunnel Syndrome, with surgical intervention left wrist only.    Patient's limitations or Problem List includes:  Pain, Decreased ROM/motion, Weakness, Sensory disturbance, Adherent scarring and Decreased  of the left wrist and hand which interferes with the patient's ability to perform Self Care Tasks (dressing, eating, bathing), Work Tasks, Sleep Patterns, Recreational Activities, Household Chores and Driving  as compared to previous level of function.    Rehab Potential:  Excellent - Return to full activity, no limitations    Patient will benefit from skilled Occupational Therapy to increase flexibility, overall strength,  strength, pinch strength and sensation and decrease pain and adherence of scarring to return to previous activity level and resume normal daily tasks and to reach their rehab potential.    Barriers to Learning:  No barrier    Communication Issues:  Patient appears to be able to clearly communicate and understand verbal and written communication and follow directions correctly.    Chart Review: Chart Review and Simple history review with patient    Identified Performance Deficits: bathing/showering, dressing, hygiene and grooming, driving and  community mobility, home establishment and management, meal preparation and cleanup, sleep, work and leisure activities    Assessment of Occupational Performance:  5 or more Performance Deficits    Clinical Decision Making (Complexity): Low complexity    Treatment Explanation:  The following has been discussed with the patient:    RX ordered/plan of care  Anticipated outcomes  Possible risks and side effects    Plan:  Frequency:  1 X week, once daily  Duration:  for 1 month tapering to 2 X a month over 2 months    Treatment Plan:    Modalities:    US   Therapeutic Exercise:    AROM, PROM, Tendon Gliding and Isotonics  Neuromuscular re-ed:   Nerve Gliding  Manual Techniques:   Scar mobilization  Self Care:    Self Care Tasks    Discharge Plan:    Achieve all LTG.  Independent in home treatment program.  Reach maximal therapeutic benefit.    Home Exercise Program:  Warmth for stiffness  Scar massage, 3 vector  Scar pad with tubigrip sleeve sleeping  PROM wrist Ext  Tendon gliding with 3 fists and FDS  Distal median nerve gliding   strengthening with foam wedge    Next Visit:  See in 1 week  Assess response to HEP and scar pad  Progress to pinch strengthening   Consider US scar softening

## 2023-06-22 ENCOUNTER — TELEPHONE (OUTPATIENT)
Dept: SURGERY | Facility: CLINIC | Age: 48
End: 2023-06-22
Payer: COMMERCIAL

## 2023-06-22 ENCOUNTER — TELEPHONE (OUTPATIENT)
Dept: UROLOGY | Facility: CLINIC | Age: 48
End: 2023-06-22
Payer: COMMERCIAL

## 2023-06-22 DIAGNOSIS — G56.01 RIGHT CARPAL TUNNEL SYNDROME: Primary | ICD-10-CM

## 2023-06-22 NOTE — TELEPHONE ENCOUNTER
[facility] Call Center    Phone Message    May a detailed message be left on voicemail: yes     Reason for Call: Appointment Intake    Referring Provider Name: Self  Diagnosis and/or Symptoms: Bladder lift     Pt is calling to schedule bladder lift with Nakib. Pt was previously scheduled for the surgery on 1/1/21 but cancelled due to COVID. Please call pt, [phone]. Thank you.    Action Taken: Other: Uro    Travel Screening: Not Applicable

## 2023-06-23 ENCOUNTER — TELEPHONE (OUTPATIENT)
Dept: SURGERY | Facility: CLINIC | Age: 48
End: 2023-06-23

## 2023-06-23 PROBLEM — G56.01 RIGHT CARPAL TUNNEL SYNDROME: Status: ACTIVE | Noted: 2023-06-22

## 2023-06-23 NOTE — TELEPHONE ENCOUNTER
Type of surgery: RELEASE, CARPAL TUNNEL (Right)   CPT 13655     Right carpal tunnel syndrome G56.01    Location of surgery: [facility]  Date and time of surgery: 08/18/2023  Surgeon: CASSANDRA  Pre-Op Appt Date: pt will schedule with Himanshuina  Post-Op Appt Date: 08/31/2023   Packet sent out: Yes  Pre-cert/Authorization completed:  No prior auth required per [employer] online list.    Date: 6-26-23    Elyse Alva  Financial Securing/Prior Auth Dept  [phone]

## 2023-06-26 NOTE — TELEPHONE ENCOUNTER
Received message from Dr. Roca who recommends a virtual visit with her prior to scheduling surgery since the last visit was 2/10/20.    Message sent to scheduling team with request to contact patient to assist in scheduling a virtual visit with Dr. Roca.    Sandie Peraza RN, BSN

## 2023-06-26 NOTE — TELEPHONE ENCOUNTER
6/26 Patient scheduled.     Lorna parson Procedure   Dermatology, Surgery, Urology  [facility]

## 2023-07-11 PROBLEM — M25.632 WRIST STIFFNESS, LEFT: Status: RESOLVED | Noted: 2023-04-13 | Resolved: 2023-07-11

## 2023-07-31 ENCOUNTER — VIRTUAL VISIT (OUTPATIENT)
Dept: UROLOGY | Facility: CLINIC | Age: 48
End: 2023-07-31
Payer: COMMERCIAL

## 2023-07-31 DIAGNOSIS — N39.3 STRESS INCONTINENCE: Primary | ICD-10-CM

## 2023-07-31 PROCEDURE — 99204 OFFICE O/P NEW MOD 45 MIN: CPT | Mod: 95 | Performed by: UROLOGY

## 2023-07-31 NOTE — PROGRESS NOTES
Virtual Visit Details    Type of service:  Video Visit   Video Start Time: 11:19 AM  Video End Time:11:25 AM    Originating Location (pt. Location): Home    Distant Location (provider location):  Off-site  Platform used for Video Visit: Mario Alberto

## 2023-07-31 NOTE — NURSING NOTE
Is the patient currently in the state of [place]? YES    Visit mode:VIDEO    If the visit is dropped, the patient can be reconnected by: VIDEO VISIT: Text to cell phone: [phone]    Will anyone else be joining the visit? NO      How would you like to obtain your AVS? MyChart    Are changes needed to the allergy or medication list? NO    Reason for visit: Consult

## 2023-07-31 NOTE — NURSING NOTE
Bladder sling consent form sent to patient with directions on how to send back.     Coleen Bartlett LPN on 7/31/2023 at 2:41 PM

## 2023-07-31 NOTE — LETTER
Jessy Jain,    During your visit with Dr. Roca you discussed having a surgery. For this surgery we require you to read and complete the highlighted sections pages 3-6 of this form. Once you have completed this form please mail back to the clinic or drop the consent forms off at the clinic. If you have any questions or concerns please call [phone].      Thank you,   Your Urology Care Team

## 2023-07-31 NOTE — PROGRESS NOTES
HPI:  Cristobal Diggs is a 48 year old female with hx of stress urinary incontinence.  She was supposed to have surgery in 2020 but due to covid the surgery was canceled. She continues to have stress incontinence and it has gotten worse.  She is still interested in surgery.      Reviewed previous notes from Dr. Thurner from 6/12/23 and myself from 2/10/20  Exam:  There were no vitals taken for this visit.  GENERAL: Healthy, alert and no distress  EYES: Eyes grossly normal to inspection.  No discharge or erythema, or obvious scleral/conjunctival abnormalities.  RESP: No audible wheeze, cough, or visible cyanosis.  No visible retractions or increased work of breathing.    SKIN: Visible skin clear. No significant rash, abnormal pigmentation or lesions.  NEURO: Cranial nerves grossly intact.  Mentation and speech appropriate for age.  PSYCH: Mentation appears normal, affect normal/bright, judgement and insight intact, normal speech and appearance well-groomed.      Review of Labs:  The following labs were reviewed by me and discussed with the patient:  Lab Results   Component Value Date    WBC 9.1 08/14/2019     Lab Results   Component Value Date    RBC 4.49 08/14/2019     Lab Results   Component Value Date    HGB 12.2 08/14/2019     Lab Results   Component Value Date    HCT 36.1 08/14/2019     Lab Results   Component Value Date    MCV 80 08/14/2019     Lab Results   Component Value Date    MCH 27.2 08/14/2019     Lab Results   Component Value Date    MCHC 33.8 08/14/2019     Lab Results   Component Value Date    RDW 13.5 08/14/2019     Lab Results   Component Value Date     08/14/2019        Last Comprehensive Metabolic Panel:  Sodium   Date Value Ref Range Status   05/22/2012 140 133 - 144 mmol/L Final     Potassium   Date Value Ref Range Status   05/22/2012 4.0 3.4 - 5.3 mmol/L Final     Chloride   Date Value Ref Range Status   05/22/2012 103 94 - 109 mmol/L Final     Carbon Dioxide   Date Value Ref Range  Status   05/22/2012 26 20 - 32 mmol/L Final     Anion Gap   Date Value Ref Range Status   05/22/2012 11 6 - 17 mmol/L Final     Glucose   Date Value Ref Range Status   09/26/2017 89 70 - 99 mg/dL Final     Comment:     Fasting specimen     Urea Nitrogen   Date Value Ref Range Status   05/22/2012 14 5 - 24 mg/dL Final     Creatinine   Date Value Ref Range Status   05/22/2012 0.82 0.52 - 1.04 mg/dL Final     GFR Estimate   Date Value Ref Range Status   05/22/2012 78 >60 mL/min/1.7m2 Final     Calcium   Date Value Ref Range Status   05/22/2012 9.6 8.5 - 10.4 mg/dL Final     Bilirubin Total   Date Value Ref Range Status   05/22/2012 0.5 0.2 - 1.3 mg/dL Final     Alkaline Phosphatase   Date Value Ref Range Status   05/22/2012 74 40 - 150 U/L Final     ALT   Date Value Ref Range Status   05/22/2012 14 0 - 50 U/L Final     AST   Date Value Ref Range Status   05/22/2012 29 0 - 45 U/L Final                Color Urine (no units)   Date Value   12/29/2020 Yellow     Appearance Urine (no units)   Date Value   12/29/2020 Clear     Glucose Urine (mg/dL)   Date Value   12/29/2020 Negative     Bilirubin Urine (no units)   Date Value   12/29/2020 Negative     Ketones Urine (mg/dL)   Date Value   12/29/2020 Negative     Specific Gravity Urine (no units)   Date Value   12/29/2020 <=1.005     pH Urine (pH)   Date Value   12/29/2020 6.0     Protein Albumin Urine (mg/dL)   Date Value   12/29/2020 Negative     Urobilinogen Urine (EU/dL)   Date Value   12/29/2020 0.2     Nitrite Urine (no units)   Date Value   12/29/2020 Negative     Leukocyte Esterase Urine (no units)   Date Value   12/29/2020 Negative          Assessment & Plan   48 year old female with microhematuria and negative w/u.  She also complains of stress urinary incontinence.  We discussed options of observation, PT, and midurethral sling.  We discussed the use of mesh in surgery and the risks which include but are not limited to infection, bleeding, exposure of mesh, vaginal  pain, injury to the bladder/urethra/rectum or any structures in the abdomen or pelvis, as well as risk of incontinence or urinary retention. There is also risk of need for catheterization and possible further surgery.   We discussed the FDA public health notification at length.  The pt. Verbalized understanding and had a chance to ask her questions which were all answered and the patient would like to proceed with a midurethral sling.  -schedule midurethal sling and cystoscopy.      Wero Roca MD  [facility]      ==========================      Additional Coding Information:    Time spent:  48 minutes spent on the date of the encounter doing chart review, history and exam, documentation and further activities per the note

## 2023-08-01 ENCOUNTER — TELEPHONE (OUTPATIENT)
Dept: UROLOGY | Facility: CLINIC | Age: 48
End: 2023-08-01
Payer: COMMERCIAL

## 2023-08-01 ENCOUNTER — HOSPITAL ENCOUNTER (OUTPATIENT)
Facility: AMBULATORY SURGERY CENTER | Age: 48
End: 2023-08-01
Attending: UROLOGY | Admitting: UROLOGY
Payer: COMMERCIAL

## 2023-08-10 NOTE — H&P (VIEW-ONLY)
[facility]      Preoperative Consultation   Cristobal Darnell   : 1975   Gender: female    Date of Encounter: 8/10/2023    Nursing Notes:   Ofe Cabral  8/10/2023  1:27 PM  Signed  Cristobal Darnell is a 48 y.o. female (1975) who presents for preop evaluation undergoing RELEASE, CARPAL TUNNEL     Date of Surgery: 2023  Surgical Specialty: Orthopedic/Spine  Diego Serna MD - [facility]/Surgical Facility: [facility]   Fax number: [phone]  Surgery type: outpatient  Primary Physician: Mele Lloyd CMA  8/10/2023 1:27 PM           History of Present Illness   preop for     Cristobal Darnell is a 48 y.o. female (1975) who presents for preop evaluation undergoing RELEASE, CARPAL TUNNEL        Review of Systems   A comprehensive review of systems was negative except for items noted in HPI.    Patient Active Problem List   Diagnosis Code     Cervicalgia M54.2     Chronic low back pain M54.50, G89.29     Chronic right shoulder pain M25.511, G89.29     CTS (carpal tunnel syndrome) G56.00     Major depressive disorder, single episode, moderate degree (HC) F32.1     Migraines G43.909     Myofascial muscle pain M79.18     Patellofemoral disorder, unspecified laterality M22.2X9     Stress incontinence N39.3     Current Outpatient Medications   Medication Sig     acetaminophen (TYLENOL EXTRA STRGTH) 500 mg tablet 500mg-1000mg my mouth every 8 hours as needed. Max acetaminophen dose: 4000mg in 24 hrs.     cholecalciferol (VITAMIN D) 1,000 unit tablet TAKE 1 TABLET BY MOUTH DAILY     cyanocobalamin (VITAMIN B-12) 1,000 mcg tablet Take 1,000 mcg by mouth once daily.     DULoxetine (CYMBALTA) 20 mg Delayed-release capsule Take 2 Capsules (40 mg) by mouth once daily.     durable medical equipment (DME) - Patient has deep venous insufficiency she needs compression stocking pressure 16-20     ergocalciferol (VITAMIN D2; DRISDOL) 50,000  unit capsule TAKE 1 CAPSULE BY MOUTH ONCE WEEKLY     meloxicam 15 mg tablet Take 1 Tablet (15 mg) by mouth once daily.     MULTIVITAMIN ORAL Take 1 Tab by mouth once daily.     omeprazole (PRILOSEC) 40 mg Delayed-Release capsule Take 1 Capsule (40 mg) by mouth once daily.     sertraline (ZOLOFT) 25 mg tablet Take 1 Tablet (25 mg) by mouth every morning.     triamcinolone acetonide 40 mg/mL kit 40 mg.     Current Facility-Administered Medications   Medication Dose Route Frequency Last Rate     levonorgestrel intrauterine device (MIRENA) 1 Device  1 Device Intrauterine q 5 years       Medications have been reviewed by me and are current to the best of my knowledge and ability.     Allergies   Allergen Reactions     Citalopram Dizziness     Dizziness at 40 mg      Sertraline Dizziness     dizziness  dizziness     Voltaren  [Diclofenac Sodium] Nausea Only     Voltaren  [Diclofenac] Nausea Only     Past Surgical History:   . Laterality Date     abdominoplasty  03/14/2021    Done in [place]      NO PREVIOUS SURGERY       Social History     Tobacco Use     Smoking status: Never     Smokeless tobacco: Never     Tobacco comments:     non-smoker   Vaping Use     Vaping Use: Never used   Substance Use Topics     Alcohol use: No     Alcohol/week: 0.0 standard drinks of alcohol     Drug use: No     Family History   Problem Relation Age of Onset     Diabetes Mother      Hypertension Mother      Heart Disease Mother      Other Mother         blood clot in leg       PAST DIFFICULTY WITH ANESTHESIA: None     Physical Exam   /64 (Cuff Site: Right Arm, Position: Sitting, Cuff Size: Adult Large)   Pulse 88   Temp 97.4  F (36.3  C) (Tympanic)   Resp 16   Ht 1.524 m (5')   Wt 73.5 kg (162 lb)   SpO2 100%   BMI 31.64 kg/m   Body mass index is 31.64 kg/m .  OBJECTIVE:  /64 (Cuff Site: Right Arm, Position: Sitting, Cuff Size: Adult Large)   Pulse 88   Temp 97.4  F (36.3  C) (Tympanic)   Resp 16   Ht 1.524 m (5')   Wt  73.5 kg (162 lb)   SpO2 100%   BMI 31.64 kg/m    HEENT: Eyes: Normal, lids & Conjunctiva   Ears: Both TM's are clear, No Retraction . Throat:  Clear, No PND  Neck: supple, No lymphadenopathy, No thyroid enlargement  Lungs: Clear, No wheezing, rales, rhonchi.  No chest wall tenderness.  Heart:  RRR No murmur or gage  Abdomen: positive bowel sounds, No tenderness,  No flank pain.   Skin:  No rashes, lesions.             Assessment / Plan     The Pre-Op Tool    Recommendations      Low Risk Procedure    Cardiac History  No history of coronary artery disease           Labs  No routine labs indicated  EKG  Not indicated  Stress Testing  Not indicated    * Testing recommendations are intended to assist, but not direct, clinical decisions.            Labs  * Data supports elimination of  routine  laboratory testing in favor of focused,  indicated  testing based on medical co-morbidities. A 2009 study randomized 1061 patients undergoing ambulatory, non-cataract surgery to routine or to indicated testing. Perioperative adverse events were similar (Anesthesia & Analgesia 2009;108:467-75; Anesthesiol. Clin. 2016 Mar;34(1):43-58).  EKG  * The ACC/AHA recommends against obtaining routine EKGs in patients undergoing low risk surgeries, a class IIa recommendation (JACC. 2014;64(21);e1-76).     Session ID: [id number]  Endnotes and bibliography available upon request: [email or web address]    Labs: no  ECG: no    ICD-10-CM    1. Carpal tunnel syndrome of right wrist  G56.01       2. Pre-op exam  Z01.818         Patient is cleared for planned procedure.   Electronically Signed by:   Mele Lloyd MD ....................  8/10/2023   1:38 PM    8/10/2023

## 2023-08-17 ENCOUNTER — ANESTHESIA EVENT (OUTPATIENT)
Dept: SURGERY | Facility: AMBULATORY SURGERY CENTER | Age: 48
End: 2023-08-17
Payer: COMMERCIAL

## 2023-08-18 ENCOUNTER — ANESTHESIA (OUTPATIENT)
Dept: SURGERY | Facility: AMBULATORY SURGERY CENTER | Age: 48
End: 2023-08-18
Payer: COMMERCIAL

## 2023-08-18 ENCOUNTER — HOSPITAL ENCOUNTER (OUTPATIENT)
Facility: AMBULATORY SURGERY CENTER | Age: 48
Discharge: HOME | End: 2023-08-18
Attending: ORTHOPAEDIC SURGERY | Admitting: ORTHOPAEDIC SURGERY
Payer: COMMERCIAL

## 2023-08-18 VITALS
SYSTOLIC BLOOD PRESSURE: 122 MMHG | DIASTOLIC BLOOD PRESSURE: 68 MMHG | TEMPERATURE: 97.4 F | RESPIRATION RATE: 16 BRPM | OXYGEN SATURATION: 96 %

## 2023-08-18 DIAGNOSIS — G56.01 RIGHT CARPAL TUNNEL SYNDROME: Primary | ICD-10-CM

## 2023-08-18 PROCEDURE — G8907 PT DOC NO EVENTS ON DISCHARG: HCPCS

## 2023-08-18 PROCEDURE — 26055 INCISE FINGER TENDON SHEATH: CPT | Mod: FA | Performed by: ORTHOPAEDIC SURGERY

## 2023-08-18 PROCEDURE — 20600 DRAIN/INJ JOINT/BURSA W/O US: CPT | Mod: LT

## 2023-08-18 PROCEDURE — G8918 PT W/O PREOP ORDER IV AB PRO: HCPCS

## 2023-08-18 PROCEDURE — 64721 CARPAL TUNNEL SURGERY: CPT | Mod: RT

## 2023-08-18 PROCEDURE — 64721 CARPAL TUNNEL SURGERY: CPT | Mod: 51 | Performed by: ORTHOPAEDIC SURGERY

## 2023-08-18 RX ORDER — HYDRALAZINE HYDROCHLORIDE 20 MG/ML
2.5-5 INJECTION INTRAMUSCULAR; INTRAVENOUS EVERY 10 MIN PRN
Status: DISCONTINUED | OUTPATIENT
Start: 2023-08-18 | End: 2023-08-22 | Stop reason: HOSPADM

## 2023-08-18 RX ORDER — PROPOFOL 10 MG/ML
INJECTION, EMULSION INTRAVENOUS PRN
Status: DISCONTINUED | OUTPATIENT
Start: 2023-08-18 | End: 2023-08-18

## 2023-08-18 RX ORDER — LIDOCAINE HYDROCHLORIDE 20 MG/ML
INJECTION, SOLUTION INFILTRATION; PERINEURAL PRN
Status: DISCONTINUED | OUTPATIENT
Start: 2023-08-18 | End: 2023-08-18

## 2023-08-18 RX ORDER — SODIUM CHLORIDE, SODIUM LACTATE, POTASSIUM CHLORIDE, CALCIUM CHLORIDE 600; 310; 30; 20 MG/100ML; MG/100ML; MG/100ML; MG/100ML
INJECTION, SOLUTION INTRAVENOUS CONTINUOUS
Status: DISCONTINUED | OUTPATIENT
Start: 2023-08-18 | End: 2023-08-22 | Stop reason: HOSPADM

## 2023-08-18 RX ORDER — CEFAZOLIN SODIUM 2 G/100ML
2 INJECTION, SOLUTION INTRAVENOUS
Status: COMPLETED | OUTPATIENT
Start: 2023-08-18 | End: 2023-08-18

## 2023-08-18 RX ORDER — HYDROXYZINE HYDROCHLORIDE 25 MG/1
25 TABLET, FILM COATED ORAL
Status: CANCELLED | OUTPATIENT
Start: 2023-08-18

## 2023-08-18 RX ORDER — FENTANYL CITRATE 50 UG/ML
25 INJECTION, SOLUTION INTRAMUSCULAR; INTRAVENOUS
Status: DISCONTINUED | OUTPATIENT
Start: 2023-08-18 | End: 2023-08-22 | Stop reason: HOSPADM

## 2023-08-18 RX ORDER — HYDROCODONE BITARTRATE AND ACETAMINOPHEN 5; 325 MG/1; MG/1
1-2 TABLET ORAL EVERY 6 HOURS PRN
Qty: 10 TABLET | Refills: 0 | Status: SHIPPED | OUTPATIENT
Start: 2023-08-18 | End: 2023-08-31

## 2023-08-18 RX ORDER — OXYCODONE HYDROCHLORIDE 5 MG/1
5 TABLET ORAL
Status: CANCELLED | OUTPATIENT
Start: 2023-08-18

## 2023-08-18 RX ORDER — PROPOFOL 10 MG/ML
INJECTION, EMULSION INTRAVENOUS CONTINUOUS PRN
Status: DISCONTINUED | OUTPATIENT
Start: 2023-08-18 | End: 2023-08-18

## 2023-08-18 RX ORDER — ONDANSETRON 2 MG/ML
4 INJECTION INTRAMUSCULAR; INTRAVENOUS EVERY 30 MIN PRN
Status: DISCONTINUED | OUTPATIENT
Start: 2023-08-18 | End: 2023-08-22 | Stop reason: HOSPADM

## 2023-08-18 RX ORDER — LIDOCAINE 40 MG/G
CREAM TOPICAL
Status: DISCONTINUED | OUTPATIENT
Start: 2023-08-18 | End: 2023-08-22 | Stop reason: HOSPADM

## 2023-08-18 RX ORDER — ONDANSETRON 2 MG/ML
INJECTION INTRAMUSCULAR; INTRAVENOUS PRN
Status: DISCONTINUED | OUTPATIENT
Start: 2023-08-18 | End: 2023-08-18

## 2023-08-18 RX ORDER — FENTANYL CITRATE 50 UG/ML
50 INJECTION, SOLUTION INTRAMUSCULAR; INTRAVENOUS EVERY 5 MIN PRN
Status: DISCONTINUED | OUTPATIENT
Start: 2023-08-18 | End: 2023-08-22 | Stop reason: HOSPADM

## 2023-08-18 RX ORDER — OXYCODONE HYDROCHLORIDE 5 MG/1
10 TABLET ORAL EVERY 4 HOURS PRN
Status: DISCONTINUED | OUTPATIENT
Start: 2023-08-18 | End: 2023-08-22 | Stop reason: HOSPADM

## 2023-08-18 RX ORDER — KETOROLAC TROMETHAMINE 30 MG/ML
INJECTION, SOLUTION INTRAMUSCULAR; INTRAVENOUS PRN
Status: DISCONTINUED | OUTPATIENT
Start: 2023-08-18 | End: 2023-08-18

## 2023-08-18 RX ORDER — OXYCODONE HYDROCHLORIDE 5 MG/1
5 TABLET ORAL EVERY 4 HOURS PRN
Status: DISCONTINUED | OUTPATIENT
Start: 2023-08-18 | End: 2023-08-22 | Stop reason: HOSPADM

## 2023-08-18 RX ORDER — ONDANSETRON 4 MG/1
4 TABLET, ORALLY DISINTEGRATING ORAL EVERY 30 MIN PRN
Status: DISCONTINUED | OUTPATIENT
Start: 2023-08-18 | End: 2023-08-22 | Stop reason: HOSPADM

## 2023-08-18 RX ORDER — TRIAMCINOLONE ACETONIDE 40 MG/ML
INJECTION, SUSPENSION INTRA-ARTICULAR; INTRAMUSCULAR PRN
Status: DISCONTINUED | OUTPATIENT
Start: 2023-08-18 | End: 2023-08-18 | Stop reason: HOSPADM

## 2023-08-18 RX ORDER — METOPROLOL TARTRATE 1 MG/ML
1-2 INJECTION, SOLUTION INTRAVENOUS EVERY 5 MIN PRN
Status: DISCONTINUED | OUTPATIENT
Start: 2023-08-18 | End: 2023-08-22 | Stop reason: HOSPADM

## 2023-08-18 RX ORDER — ONDANSETRON 4 MG/1
4 TABLET, ORALLY DISINTEGRATING ORAL
Status: CANCELLED | OUTPATIENT
Start: 2023-08-18

## 2023-08-18 RX ORDER — FENTANYL CITRATE 50 UG/ML
25 INJECTION, SOLUTION INTRAMUSCULAR; INTRAVENOUS EVERY 5 MIN PRN
Status: DISCONTINUED | OUTPATIENT
Start: 2023-08-18 | End: 2023-08-22 | Stop reason: HOSPADM

## 2023-08-18 RX ORDER — ACETAMINOPHEN 325 MG/1
975 TABLET ORAL ONCE
Status: COMPLETED | OUTPATIENT
Start: 2023-08-18 | End: 2023-08-18

## 2023-08-18 RX ADMIN — PROPOFOL 150 MCG/KG/MIN: 10 INJECTION, EMULSION INTRAVENOUS at 10:31

## 2023-08-18 RX ADMIN — ACETAMINOPHEN 975 MG: 325 TABLET ORAL at 09:44

## 2023-08-18 RX ADMIN — ONDANSETRON 4 MG: 2 INJECTION INTRAMUSCULAR; INTRAVENOUS at 10:46

## 2023-08-18 RX ADMIN — LIDOCAINE HYDROCHLORIDE 60 MG: 20 INJECTION, SOLUTION INFILTRATION; PERINEURAL at 10:31

## 2023-08-18 RX ADMIN — SODIUM CHLORIDE, SODIUM LACTATE, POTASSIUM CHLORIDE, CALCIUM CHLORIDE: 600; 310; 30; 20 INJECTION, SOLUTION INTRAVENOUS at 10:31

## 2023-08-18 RX ADMIN — KETOROLAC TROMETHAMINE 30 MG: 30 INJECTION, SOLUTION INTRAMUSCULAR; INTRAVENOUS at 10:46

## 2023-08-18 RX ADMIN — CEFAZOLIN SODIUM 2 G: 2 INJECTION, SOLUTION INTRAVENOUS at 10:28

## 2023-08-18 RX ADMIN — PROPOFOL 80 MG: 10 INJECTION, EMULSION INTRAVENOUS at 10:31

## 2023-08-18 NOTE — ANESTHESIA POSTPROCEDURE EVALUATION
Patient: Cristobal Diggs    Procedure: Procedure(s):  Right RELEASE, CARPAL TUNNEL, left thumb cortisone injection       Anesthesia Type:  MAC    Note:  Disposition: Outpatient   Postop Pain Control: Uneventful            Sign Out: Well controlled pain   PONV: No   Neuro/Psych: Uneventful            Sign Out: Acceptable/Baseline neuro status   Airway/Respiratory: Uneventful            Sign Out: Acceptable/Baseline resp. status   CV/Hemodynamics: Uneventful            Sign Out: Acceptable CV status; No obvious hypovolemia; No obvious fluid overload   Other NRE: NONE   DID A NON-ROUTINE EVENT OCCUR? No           Last vitals:  Vitals Value Taken Time   /68 08/18/23 1145   Temp 97.4  F (36.3  C) 08/18/23 1107   Pulse     Resp 16 08/18/23 1145   SpO2 96 % 08/18/23 1145       Electronically Signed By: Wero Khoury DO  August 18, 2023  12:07 PM

## 2023-08-18 NOTE — INTERVAL H&P NOTE
"I have reviewed the surgical (or preoperative) H&P that is linked to this encounter, and examined the patient. There are no significant changes    Clinical Conditions Present on Arrival:  Clinically Significant Risk Factors Present on Admission                     The History and Physical on patient's chart was personally reviewed today with the patient. there have been no interval changes in patient's history since H+P performed.    History:  Cristobal Diggs is a 48 year old female , who presents for right wrist and hand pain, numbness and tingling. Consistent with carpal tunnel syndrome. She's had injections in the past with some improvements, most recently 2023. .       She's had bilateral hand numbness and tingling, pain and weakness x 20+ years. She is status post left carpal tunnel release 2022 and has done well.  The right side symptoms have been constant, and helped by a splint and NSAIDs.  The numbness feels like its going up her arms. She'll have pain up her arms to her shoulders. At night, she statres her hand/fingers feel like they want to \"explode\".         EM2022 [facility] [place]:  Bilateral median neuropathies at the wrist consistent with carpal tunnel syndrome, moderate-sever on the left, moderate on the right. No evidence of ulnar mononeuropathy in either extremity. No evidence of left upper extremity radiculopathy.         ASSESSMENT/PLAN: 49yo RHD female with bilateral hand pain, numbness and tingling likely carpal tunnel syndrome. Cannot rule out neck etiology.     * discussed with patient signs and symptoms are somewhat consistent with carpal tunnel syndrome. Carpal tunnel syndrome is compression or pinching of the median nerve at the wrist, as it enters the hand. There are many different causes, and in most cases, multifactorial.     * An indepth discussion was had with her about the options for treatment, which included activity modification to avoid aggravating activities, " taking breaks during activities that cause symptoms, stretching, NSAIDS to help decrease inflammation and swelling within the carpal tunnel, night splinting, corticosteroid injections, and carpal tunnel release.   * depending upon severity and duration of symptoms, nonoperative treatment is usually initiated, starting with least invasive modalities such as activity modification and a trial of night splints and NSAIDs.  * Cortisone injections are considered to decrease swelling and inflammation within the carpal tunnel and compression of the nerve.   * Lastly, carpal tunnel release should symptoms persist despite trial of nonoperative treatment, or in cases of severe carpal tunnel syndrome.  * risks of surgery, including, but not limited to: bleeding, infection, pain, scar, damage to adjacent structures (nerves, vessels, tendons), temporary versus permanent nerve injury, failure to relieve symptoms, recurrence of symptoms, incomplete release, stiffness, scar sensitivity and tenderness, need for further surgery, risks of anesthesia were discussed.  * in some cases, with severe, prolonged symptoms, or in situations of underlying peripheral neuropathy, there may be permanent nerve changes not amenable to surgery, that even with surgery, may not resolve.  * in some cases, it may take 6 months to a year or longer for symptoms to improve or resolve, but even then may not completely resolve.  * cannot make certain it will alleviate symptoms up the arm to the shoulder and neck area, only in the hand, if truly carpal tunnel syndrome and nothing else.     Patient elects to proceed with planned procedure. Right open carpal tunnel release.      Risks and perceived benefits of surgery again discussed with patient. Patient's questions addressed and answered. Written informed consent obtained and reviewed. Surgical site marked with indelible marker with patient's participation after confirming site with patient.      Diego NAIK  FRANC Serna., M.S.  Dept. of Orthopaedic Surgery  [facility]

## 2023-08-18 NOTE — BRIEF OP NOTE
[facility] Brief Operative Note    Pre-operative diagnosis: Right carpal tunnel syndrome [G56.01]   Post-operative diagnosis carpal tunnel release, right. left trigger thumb cortisone injection     Procedure: Procedure(s):  Right RELEASE, CARPAL TUNNEL, left thumb cortisone injection   Surgeon(s): Surgeon(s) and Role:     * Diego Serna MD - Primary     * West Bowers PA - Assisting   Estimated blood loss: 2 mL    Specimens: * No specimens in log *   Findings: Thickened [place] on left trigger finger thumb.   Thickened TCL on carpal tunnel release
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
## 2023-08-18 NOTE — DISCHARGE INSTRUCTIONS
Tylenol 975 mg was given at 9:45 AM.    You should not take more then 4,000 mg of tylenol/acetaminophen in a 24 hour period.      Today you received Toradol, an antiinflammatory medication similar to Ibuprofen.  You should not take other antiinflammatory medication, such as Ibuprofen, Motrin, Advil, Aleve, Naprosyn, etc until after 4:30 PM.   [facility]   Adult Discharge Orders & Instructions     For 24 hours after surgery    Get plenty of rest.  A responsible adult must stay with you for at least 24 hours after you leave the hospital.   Do not drive or use heavy equipment.  If you have weakness or tingling, don't drive or use heavy equipment until this feeling goes away.  Do not drink alcohol.  Avoid strenuous or risky activities.  Ask for help when climbing stairs.   You may feel lightheaded.  IF so, sit for a few minutes before standing.  Have someone help you get up.   If you have nausea (feel sick to your stomach): Drink only clear liquids such as apple juice, ginger ale, broth or 7-Up.  Rest may also help.  Be sure to drink enough fluids.  Move to a regular diet as you feel able.  You may have a slight fever. Call the doctor if your fever is over 100 F (37.7 C) (taken under the tongue) or lasts longer than 24 hours.  You may have a dry mouth, a sore throat, muscle aches or trouble sleeping.  These should go away after 24 hours.  Do not make important or legal decisions.   Call your doctor for any of the followin.  Signs of infection (fever, growing tenderness at the surgery site, a large amount of drainage or bleeding, severe pain, foul-smelling drainage, redness, swelling).    2. It has been over 8 to 10 hours since surgery and you are still not able to urinate (pass water).    3.  Headache for over 24 hours.      .

## 2023-08-18 NOTE — ANESTHESIA CARE TRANSFER NOTE
Patient: Cristobal Diggs    Procedure: Procedure(s):  Right RELEASE, CARPAL TUNNEL, left thumb cortisone injection       Diagnosis: Right carpal tunnel syndrome [G56.01]  Diagnosis Additional Information: No value filed.    Anesthesia Type:   MAC     Note:    Oropharynx: oropharynx clear of all foreign objects  Level of Consciousness: awake  Oxygen Supplementation: room air    Independent Airway: airway patency satisfactory and stable  Dentition: dentition unchanged  Vital Signs Stable: post-procedure vital signs reviewed and stable  Report to RN Given: handoff report given  Patient transferred to: Phase II    Handoff Report: Identifed the Patient, Identified the Reponsible Provider, Reviewed the pertinent medical history, Discussed the surgical course, Reviewed Intra-OP anesthesia mangement and issues during anesthesia, Set expectations for post-procedure period and Allowed opportunity for questions and acknowledgement of understanding    Vitals:  Vitals Value Taken Time   BP     Temp     Pulse     Resp     SpO2         Electronically Signed By: FRANCES Julien CRNA  August 18, 2023  11:11 AM

## 2023-08-24 ENCOUNTER — TELEPHONE (OUTPATIENT)
Dept: UROLOGY | Facility: CLINIC | Age: 48
End: 2023-08-24
Payer: COMMERCIAL

## 2023-08-24 NOTE — TELEPHONE ENCOUNTER
Provider no longer available on 10/9 in . Rescheduled surgery to the morning of 10/23 and moved post-op appointment as well.     Margarette Romero on 8/24/2023 at 4:02 PM
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
## 2023-08-31 ENCOUNTER — OFFICE VISIT (OUTPATIENT)
Dept: ORTHOPEDICS | Facility: CLINIC | Age: 48
End: 2023-08-31
Payer: COMMERCIAL

## 2023-08-31 VITALS
HEART RATE: 73 BPM | HEIGHT: 60 IN | SYSTOLIC BLOOD PRESSURE: 124 MMHG | DIASTOLIC BLOOD PRESSURE: 79 MMHG | WEIGHT: 160.2 LBS | BODY MASS INDEX: 31.45 KG/M2

## 2023-08-31 DIAGNOSIS — M65.312 TRIGGER THUMB OF LEFT HAND: ICD-10-CM

## 2023-08-31 DIAGNOSIS — Z98.890 S/P CARPAL TUNNEL RELEASE: Primary | ICD-10-CM

## 2023-08-31 PROCEDURE — 99024 POSTOP FOLLOW-UP VISIT: CPT | Performed by: ORTHOPAEDIC SURGERY

## 2023-08-31 ASSESSMENT — PAIN SCALES - GENERAL: PAINLEVEL: NO PAIN (0)
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
## 2023-09-22 ENCOUNTER — CARE COORDINATION (OUTPATIENT)
Dept: UROLOGY | Facility: CLINIC | Age: 48
End: 2023-09-22
Payer: COMMERCIAL

## 2023-09-22 DIAGNOSIS — N39.3 STRESS INCONTINENCE: Primary | ICD-10-CM

## 2023-09-26 NOTE — PROGRESS NOTES
Upcoming surgical procedure with Dr. Roca on 10/23/23 check in 1 hour prior to surgery time.   Surgery at : [facility]  Patient is having a  CREATION, VAGINAL SLING, WITH CYSTOSCOPY   Patient has a responsible adult to drive home and stay with them for 24 hours: Yes, patient verbalized understanding  Pre-op physical scheduled: No, patient reports that she will call to schedule with her PCP at [facility]. Patient aware to complete within 14-30 days of surgery  Urinalysis and urine culture scheduled: No, patient will plan to complete at [facility] lab in [place]. Patient aware to complete UA/UC 7-10 days before surgery  Post-operative appointment needed: No, post op appointments scheduled for 11/6/23 and 11/20/23.  Sling consent: patient reports that she received this consent. Per patient, she will send it back to clinic.  All questions answered. Patient reports that she is a  for work. Informed patient that patient's typically take 1-2 weeks off of work following this surgery and that every heals differently. Informed patient that she would have to be off of narcotic pain medications before driving. Informed patient that there are restrictions of no lifting, pushing, or pulling greater than 15 pounds for 6 weeks following surgery and that we can provide a letter or complete a form if needed.    Urology clinic call back number provided to patient to call if she has any questions.    Patient verbalized understanding. No further questions at this time.    Sandie Peraza RN, BSN  Care Coordinator  [facility]

## 2023-09-29 ENCOUNTER — TELEPHONE (OUTPATIENT)
Dept: UROLOGY | Facility: CLINIC | Age: 48
End: 2023-09-29
Payer: COMMERCIAL

## 2023-09-29 NOTE — TELEPHONE ENCOUNTER
[facility] Call Center    Phone Message    May a detailed message be left on voicemail: yes     Reason for Call: Other: Patient called to cancel her upcoming surgery with Abelino. Patient stated she did not want to reschedule. Writer was unable to get ahold of Margarette, the surgery scheduler. Please advise. This is an FYI. Thank you      Action Taken: Message routed to:  Other: Neph    Travel Screening: Not Applicable

## 2023-10-02 NOTE — TELEPHONE ENCOUNTER
Per chart review, surgery with Dr. Roca and post op appointments have been cancelled per patient request.    Called and spoke to patient who is aware of the above information. Informed patient to call with any questions or if she would like to reschedule surgery in the future.    Sandie Peraza RN, BSN

## 2024-01-05 DIAGNOSIS — H04.123 DRY EYES: ICD-10-CM

## 2024-01-05 RX ORDER — POLYETHYLENE GLYCOL 400 AND PROPYLENE GLYCOL 4; 3 MG/ML; MG/ML
1 SOLUTION/ DROPS OPHTHALMIC 4 TIMES DAILY PRN
Qty: 15 ML | Refills: 4 | Status: SHIPPED | OUTPATIENT
Start: 2024-01-05

## 2024-06-27 LAB — PHQ9 SCORE: 14

## 2024-10-16 ENCOUNTER — TRANSFERRED RECORDS (OUTPATIENT)
Dept: HEALTH INFORMATION MANAGEMENT | Facility: CLINIC | Age: 49
End: 2024-10-16
Payer: COMMERCIAL

## 2024-10-24 ENCOUNTER — TRANSFERRED RECORDS (OUTPATIENT)
Dept: HEALTH INFORMATION MANAGEMENT | Facility: CLINIC | Age: 49
End: 2024-10-24
Payer: COMMERCIAL

## 2024-11-10 NOTE — TELEPHONE ENCOUNTER
"Routing refill request to provider for review/approval because:  Medication is reported/historical    Requested Prescriptions   Pending Prescriptions Disp Refills     topiramate (TOPAMAX) 25 MG tablet [Pharmacy Med Name: TOPIRAMATE 25MG TABLETS] 90 tablet 0     Sig: SEE NOTES       Anti-Seizure Meds Protocol  Failed - 9/26/2019  4:41 PM        Failed - Review Authorizing provider's last note.      Refer to last progress notes: confirm request is for original authorizing provider (cannot be through other providers).          Failed - Normal ALT or AST on file in past 26 months     Recent Labs   Lab Test 05/22/12  1553   ALT 14     Recent Labs   Lab Test 05/22/12  1553   AST 29             Passed - Recent (12 mo) or future (30 days) visit within the authorizing provider's specialty     Patient had office visit in the last 12 months or has a visit in the next 30 days with authorizing provider or within the authorizing provider's specialty.  See \"Patient Info\" tab in inbasket, or \"Choose Columns\" in Meds & Orders section of the refill encounter.              Passed - Normal CBC on file in past 26 months     Recent Labs   Lab Test 08/14/19  1447   WBC 9.1   RBC 4.49   HGB 12.2   HCT 36.1                    Passed - Normal platelet count on file in past 26 months     Recent Labs   Lab Test 08/14/19  1447                  Passed - Medication is active on med list        Passed - No active pregnancy on record        Passed - No positive pregnancy test in last 12 months        Sunita Nick RN - BC      "
Refused Prescriptions:                       Disp   Refills    topiramate (TOPAMAX) 25 MG tablet [Pharmac*                Sig: SEE NOTES  Refused By: MASSIMO GIPSON  Reason for Refusal: Originating/Specialty Provider to approve      
Right arm;

## 2024-12-18 ENCOUNTER — TELEPHONE (OUTPATIENT)
Dept: UROLOGY | Facility: CLINIC | Age: 49
End: 2024-12-18

## 2024-12-18 ENCOUNTER — OFFICE VISIT (OUTPATIENT)
Dept: UROLOGY | Facility: CLINIC | Age: 49
End: 2024-12-18
Payer: COMMERCIAL

## 2024-12-18 VITALS — WEIGHT: 167 LBS | OXYGEN SATURATION: 99 % | HEIGHT: 60 IN | HEART RATE: 77 BPM | BODY MASS INDEX: 32.79 KG/M2

## 2024-12-18 DIAGNOSIS — N39.3 STRESS INCONTINENCE: Primary | ICD-10-CM

## 2024-12-18 RX ORDER — ACETAMINOPHEN 650 MG/1
650 SUPPOSITORY RECTAL ONCE
OUTPATIENT
Start: 2024-12-18

## 2024-12-18 RX ORDER — BUPROPION HYDROCHLORIDE 100 MG/1
1 TABLET ORAL
COMMUNITY
Start: 2024-10-16

## 2024-12-18 RX ORDER — CEFAZOLIN SODIUM 2 G/50ML
2 SOLUTION INTRAVENOUS SEE ADMIN INSTRUCTIONS
OUTPATIENT
Start: 2024-12-18

## 2024-12-18 RX ORDER — CEFAZOLIN SODIUM 2 G/50ML
2 SOLUTION INTRAVENOUS
OUTPATIENT
Start: 2024-12-18

## 2024-12-18 RX ORDER — ACETAMINOPHEN 325 MG/1
975 TABLET ORAL ONCE
OUTPATIENT
Start: 2024-12-18 | End: 2024-12-18

## 2024-12-18 ASSESSMENT — PAIN SCALES - GENERAL: PAINLEVEL_OUTOF10: NO PAIN (0)

## 2024-12-18 NOTE — TELEPHONE ENCOUNTER
Called and LVM for patient to schedule surgery with Dr. Roca. Provided direct call back number [phone].      Margarette Romero on 12/18/2024 at 3:21 PM

## 2024-12-18 NOTE — PROGRESS NOTES
Reason for visit:  discuss surgery    Clinical Data: Cristobal Diggs is a 49 year old female with hx of stress urinary incontinence.  She was supposed to have surgery in 2020 but due to covid the surgery was canceled.  Then again was supposed to have it but was nervous, but she would definitely like to have it now.      Assessment & Plan   49 year old female with microhematuria and negative w/u.  She also complains of stress urinary incontinence.  We again discussed options of observation, PT, and midurethral sling.  We discussed the use of mesh in surgery and the risks which include but are not limited to infection, bleeding, exposure of mesh, vaginal pain, injury to the bladder/urethra/rectum or any structures in the abdomen or pelvis, as well as risk of incontinence or urinary retention. There is also risk of need for catheterization and possible further surgery.   The pt. Verbalized understanding and had a chance to ask her questions which were all answered and the patient would like to proceed with a midurethral sling.  -schedule midurethal sling and cystoscopy.      Wero Roca MD  [facility]

## 2024-12-18 NOTE — NURSING NOTE
Chief Complaint   Patient presents with    Consult      stress incontinence, discuss bladder sling again       Pulse 77, height 1.524 m (5'), weight 75.8 kg (167 lb), SpO2 99%, not currently breastfeeding. Body mass index is 32.61 kg/m .    Patient Active Problem List   Diagnosis    Vitamin B 12 deficiency    CARDIOVASCULAR SCREENING; LDL GOAL LESS THAN 160    Obesity    Major depressive disorder, single episode, moderate degree (H)    Chronic low back pain    CTS (carpal tunnel syndrome) - bilateral    Elevated blood uric acid level    Migraines    Pain in thoracic spine    GERD (gastroesophageal reflux disease)    Intertrigo    Female stress incontinence    TMJ (temporomandibular joint disorder)    Cervicalgia    Myofascial muscle pain    Patellofemoral disorder, unspecified laterality    Chronic right shoulder pain    Stress incontinence    S/P carpal tunnel release    Right carpal tunnel syndrome       Allergies   Allergen Reactions    Citalopram      Dizziness at 40 mg     Diclofenac Sodium GI Disturbance    Zoloft      dizziness       Current Outpatient Medications   Medication Sig Dispense Refill    buPROPion (WELLBUTRIN) 100 MG tablet Take 1 tablet by mouth daily at 2 pm.      GOODSENSE ARTHRITIS PAIN 650 MG CR tablet TAKE 1 TABLET BY MOUTH EVERY 8 HOURS AS NEEDED FOR PAIN 60 tablet 10    Multiple Vitamin (TAB-A-CHRISTIAN) TABS TAKE ONE TABLET BY MOUTH EVERY DAY 90 tablet 1    naproxen (NAPROSYN) 500 MG tablet Take 1 tablet (500 mg) by mouth 2 times daily as needed for moderate pain 60 tablet 11    Vitamin D3 (VITAMIN D3) 25 mcg (1000 units) tablet Take 1 tablet (25 mcg) by mouth daily 90 tablet 3    DULoxetine (CYMBALTA) 20 MG capsule Take 20 mg by mouth (Patient not taking: Reported on 12/18/2024)      polyethylene glycol-propylene glycol (SYSTANE) 0.4-0.3 % SOLN ophthalmic solution Place 1 drop into both eyes 4 times daily as needed for dry eyes (Patient not taking: Reported on 12/18/2024) 15 mL 4     senna-docusate (SENOKOT-S/PERICOLACE) 8.6-50 MG tablet Take 1-2 tablets by mouth 2 times daily (Patient not taking: Reported on 12/18/2024) 30 tablet 0    topiramate (TOPAMAX) 25 MG tablet TAKE 3 TABLETS BY MOUTH DAILY (Patient not taking: Reported on 12/18/2024) 90 tablet 10       Social History     Tobacco Use    Smoking status: Never    Smokeless tobacco: Never   Substance Use Topics    Alcohol use: No     Alcohol/week: 0.0 standard drinks of alcohol    Drug use: No       Christopher Abdalla, [facility]  12/18/2024  11:50 AM

## 2024-12-18 NOTE — PROGRESS NOTES
Pre Op Teaching Flowsheet       Pre and Post op Patient Education  Relevant Diagnosis:  stress incontinence  Surgical procedure:  CREATION, VAGINAL SLING, WITH CYSTOSCOPY (support the urethra with mesh sling and look in the bladder)   Teaching Topic:  Pre and post op teaching  Person Involved in teaching: Yes    Motivation Level:  Asks Questions: Yes  Eager to Learn: Yes  Cooperative: Yes  Receptive (willing/able to accept information):  Yes    Patient demonstrates understanding of the following:  Date of surgery:  will call   Location of surgery:  [facility] - 5th Floor, patient was scheduled at [place] previously and wants schedule for there this time  History and Physical and any other testing necessary prior to surgery: Yes  Required time line for completion of History and Physical and any pre-op testing: Yes    Patient demonstrates understanding of the following:  Pre-op bowel prep:  N/A  Pre-op showering/scrub information with PCMX Soap: Yes  Blood thinner medications discussed and when to stop (if applicable):  Yes  Discussed no visitor's at this time due to increase Covid-19 cases and how we need to make sure everyone stays safe.    Infection Prevention:   Patient demonstrates understanding of the following:  Surgical procedure site care taught: Yes  Signs and symptoms of infection taught: Yes      Post-op follow-up:  Discussed how to contact the hospital, nurse, and clinic scheduling staff if necessary. (See packet information)    Instructional materials used/given/mailed:  [facility] Packet, post op teaching sheet, Map, Soap, and with the arrival/location information to come closer to the surgery date.    Surgical instructions packet given to patient in office:  N/A    Follow up: Discussed arranging for someone to drive you home. ( No public transportation)  Someone needed to stay the first twenty hours after surgery: Yes     referral:  not needed      home:  sister     Care Giver:  sister     PCP:  Dr. Lloyd     Discussed having urine culture done prior to surgery to rule out UTI    Discussed stopping all vitamins, supplements and Tirzpeptide. She injects Tuesdays     Flores Concepcion, RN, BSN, PHN  Care Coordinator Urology  [facility]  [phone]

## 2024-12-18 NOTE — LETTER
12/18/2024       RE: Cristobal Diggs  [address] [address]     Dear Colleague,    Thank you for referring your patient, Cristboal Diggs, to the [facility] [place] at [facility]. Please see a copy of my visit note below.    Reason for visit:  discuss surgery    Clinical Data: Cristobal Diggs is a 49 year old female with hx of stress urinary incontinence.  She was supposed to have surgery in 2020 but due to covid the surgery was canceled.  Then again was supposed to have it but was nervous, but she would definitely like to have it now.      Assessment & Plan  49 year old female with microhematuria and negative w/u.  She also complains of stress urinary incontinence.  We again discussed options of observation, PT, and midurethral sling.  We discussed the use of mesh in surgery and the risks which include but are not limited to infection, bleeding, exposure of mesh, vaginal pain, injury to the bladder/urethra/rectum or any structures in the abdomen or pelvis, as well as risk of incontinence or urinary retention. There is also risk of need for catheterization and possible further surgery.   The pt. Verbalized understanding and had a chance to ask her questions which were all answered and the patient would like to proceed with a midurethral sling.  -schedule midurethal sling and cystoscopy.      Wero Roca MD  [facility]              Again, thank you for allowing me to participate in the care of your patient.      Sincerely,    Wero Roca MD

## 2025-01-13 DIAGNOSIS — H04.123 DRY EYES: ICD-10-CM

## 2025-01-13 RX ORDER — POLYETHYLENE GLYCOL 400 AND PROPYLENE GLYCOL 4; 3 MG/ML; MG/ML
1 SOLUTION/ DROPS OPHTHALMIC 4 TIMES DAILY PRN
Qty: 15 ML | Refills: 4 | Status: SHIPPED | OUTPATIENT
Start: 2025-01-13

## 2025-02-24 ENCOUNTER — DOCUMENTATION ONLY (OUTPATIENT)
Dept: UROLOGY | Facility: CLINIC | Age: 50
End: 2025-02-24
Payer: COMMERCIAL

## 2025-02-24 NOTE — CONFIDENTIAL NOTE
Received patient's pre-op notes via fax on 2/24/25 for upcoming surgery on 3/4/25 with  at the [facility]. Faxed to STAT scanning and placed into STAT scanning folder.     Faxed pre-op over to the [facility].    Marti Prado MA on 2/24/2025 at 11:33 AM

## 2025-02-25 DIAGNOSIS — R39.89 SUSPECTED UTI: Primary | ICD-10-CM

## 2025-02-26 ENCOUNTER — LAB (OUTPATIENT)
Dept: LAB | Facility: CLINIC | Age: 50
End: 2025-02-26
Payer: COMMERCIAL

## 2025-02-26 DIAGNOSIS — R39.89 SUSPECTED UTI: ICD-10-CM

## 2025-02-26 LAB
ALBUMIN UR-MCNC: NEGATIVE MG/DL
APPEARANCE UR: CLEAR
BACTERIA #/AREA URNS HPF: ABNORMAL /HPF
BILIRUB UR QL STRIP: NEGATIVE
COLOR UR AUTO: YELLOW
GLUCOSE UR STRIP-MCNC: NEGATIVE MG/DL
HGB UR QL STRIP: ABNORMAL
KETONES UR STRIP-MCNC: NEGATIVE MG/DL
LEUKOCYTE ESTERASE UR QL STRIP: NEGATIVE
NITRATE UR QL: NEGATIVE
PH UR STRIP: 6 [PH] (ref 5–7)
RBC #/AREA URNS AUTO: ABNORMAL /HPF
SP GR UR STRIP: <=1.005 (ref 1–1.03)
SQUAMOUS #/AREA URNS AUTO: ABNORMAL /LPF
UROBILINOGEN UR STRIP-ACNC: 0.2 E.U./DL
WBC #/AREA URNS AUTO: ABNORMAL /HPF

## 2025-02-26 PROCEDURE — 87086 URINE CULTURE/COLONY COUNT: CPT

## 2025-02-26 PROCEDURE — 81001 URINALYSIS AUTO W/SCOPE: CPT

## 2025-02-27 LAB — BACTERIA UR CULT: NO GROWTH

## 2025-03-12 NOTE — PROGRESS NOTES
Patient interested in weight loss surgery.  Name: recommendation  PCP: Dr. Love  Referred by: self    Ht: 5'  Wt: 184  BMI: 35.9    Occupation/work status: home    Insurance: [employer]  Instructed to check with insurance company regarding exclusions prior to first appt.    I would like to ask you some questions to see if you are a candidate for weight loss surgery with our program.    Verify Obesity related Co-Morbidities:  _n_Diabetes Mellitus  __I or__ II,         _n_Hypertension, __on meds  _n_High cholesterol, __ on meds  _n_Sleep Apnea,         __CPAP  _y_GERD, _n_on meds  _y_Degenerative Joint Disease, _y_ on meds (pain killers) knee,back    Other:  _n_Fibromyalgia  _n_Chronic abdominal pain, __treatment  _n_Dialysis    Abdominal Surgeries:  no    Psychiatric History:  _y_Depression, _y_ treatment:__  _y_Anxiety, _y_treatment:___  _n_Schizophrenia,  __ treatment:__  _n_Borderline personality disorder, __treatment  _n_Bipolar type I or type II, treatment: ___  _n_Suicide Attempts:   _n_Hospitalizations for mental health concerns:   Update/review Problem List   No smoking, no drugs, no alchol    Support Systems:  no    Scheduled to see Stacey Lassiter PA-C at 1230 on 3/8/18, followed by an appt with a dietician.  Instructed to view seminar and complete pre-visit questionnaires prior to visit at [email or web address].   Never received any anesthesia in the past/none

## 2025-04-02 NOTE — OP NOTE
OPERATIVE REPORT    DATE OF SERVICE:  8/18/2023      PREOPERATIVE DIAGNOSIS  Right carpal tunnel syndrome.   Left trigger thumb     POSTOPERATIVE DIAGNOSIS  Right carpal tunnel syndrome.   Left trigger thumb     NAME OF OPERATION  Open right carpal tunnel release.  Left trigger thumb cortisone injection.     SURGEON  Diego Serna MD     FIRST ASSISTANT  [place] PA-C     ANESTHESIA: MAC with local    ESTIMATED BLOOD LOSS: 2mL    ANTIBIOTICS: cefazolin 2g iv , prior to incision    IVF: 200 ml LR.    FINDINGS: thick transverse carpal ligament, flattened median nerve, hyperemic changes of median nerve. Triggering of the left thumb.    Tourniquet time: 10 minutes at 200 mmHg.    Complications: None apparent.    SPECIMENS: none    DRAINS: none    IMPLANTS: none    INDICATIONS  Cristobal Diggs is a 48 year old female with moderate-severe right carpal tunnel syndrome, confirmed on EMG. The symptoms have been quite bothersome for many years.  Conservative treatment has failed, including night splints, therapy, NSAIDs and injections. She also mentions left trigger thumb for over half a year. Very painful, locking. Risks of surgery, including but not limited to: bleeding, infection, pain, scar, damage to adjacent structures (nerves, vessels), temporary vs permanent nerve damage, failure to relieve symptoms, recurrence of symptoms, need for further surgery, risks of anesthesia, and death were discussed. All questions were addressed to patient satisfaction. The patient elected to proceed with this surgery understanding the risks and perceived benefits. Informed consent was obtained for the procedure.       PROCEDURE IN DETAIL  The patient was identified in the preoperative holding area. The correct procedure and procedural sites were confirmed with the patient. Consent was reviewed. The correct surgical site was marked with an indelible marker, as well as the left thumb injection site, by the attending surgeon, Diego  GUMARO Serna MD.  The patient was then taken to the operating room and placed on the operating table in the supine position.  Tourniquet was placed around the proximal arm. All bony prominences well padded.      IV sedation was given by Anesthesia. A preprocedural intial timeout was made for the right local injection as well as the left trigger thumb injection. A Local anesthetic using a combination of 0.25% Marcaine and 1% lidocaine was injected in the anticipated incision site for the right carpal tunnel release surgery.     The left thumb was then injected, after sterile prep, using 20mg Kenalog and 1ml 1% lidocaine plain, to the A1 pulley and tendon sheath of the thumb. This went without apparent complication and the site was cleaned and covered with a band-aid.    At that time the right upper extremity was prepped and draped in the usual sterile fashion.     A timeout was performed confirming the correct patient, procedure, and procedural site with all operating room staff.    The right upper extremity was exsanguinated and tourniquet inflated to 200 mmHg.    A longitudinal incision was made in the usual location at the base of the palm just ulnar to midline and the palmaris longus.  The incision was taken down through the skin and subcutaneous tissue carefully to the level of the palmar fascia.  The palmar fascia was then carefully incised, and the transverse carpal ligament was then exposed. The transverse carpal ligament was incised from distal to proximal under direct vision while protecting underlying structures with an elevator.  The transverse carpal ligament was tight and thick.  Proximally, we protected the underlying structures using a clamp, and then completed the ligament incision using a tenotomy scissors.  The distal volar forearm fascia was also incised longitudinally for a segment of approximately 3cm. The median nerve was quite adhered to the undersurface of the transverse carpal ligament  medially, which was gently dissected off, completely exposing the nerve and was noted to have flattened, hyperemic appearance. Once I felt that the transverse carpal ligament was released completely, I used my small finger as a probe to make sure that it was in fact completely released, as it was. The wound was copiously irrigated with normal saline and the tourniquet deflated.  Hemostasis was achieved with electrocautery.  Then, 4-0 nylon sutures placed in a horizontal mattress fashion were used to close the skin, and a sterile dressing was applied followed by a volar splint. The patient was then transferred to the hospital cart and to the recovery area in stable condition. There were no apparent complications.    Postop plan will be partial weight-bearing of right upper extremity. Splint to be in place at all times until follow-up in 10-14 days. Oral pain medications (hydrocodone) will be provided. Elevation of right upper extremity at all times to reduce swelling. Finger range of motion.    Diego Serna M.D., M.S.  Dept. of Orthopaedic Surgery  [facility]    Reviewed

## 2025-05-15 ENCOUNTER — TELEPHONE (OUTPATIENT)
Dept: UROLOGY | Facility: CLINIC | Age: 50
End: 2025-05-15
Payer: MEDICAID

## 2025-05-15 NOTE — TELEPHONE ENCOUNTER
5/15 Called and left voicemail, provided phone number [phone] to schedule an appointment with Tee Daly on August 4th at 8.    [facility]   Orthopedics, Podiatry, Sports Medicine, Ent ,Eye , Audiology, Adult Endocrine & Diabetes, Nutrition & Medication Therapy Management Specialties   [facility]

## 2025-05-15 NOTE — TELEPHONE ENCOUNTER
Received message from pre-op nurses that patient would like to reschedule upcoming surgery on 5/20/25 due to her daughter giving birth.     Called patient and rescheduled procedure to be on 7/22/25 per patient request.    Patient will need her 2 week post-op rescheduled however there are no open clinic spots. Will send message to clinic.    6 week virtual appointment with Dr. Roca rescheduled to 9/3/25 at 1:00 PM.    Mailing new surgery packet with appointments to patient.    Margarette Romero on 5/15/2025 at 11:54 AM

## 2025-07-10 DIAGNOSIS — R39.89 SUSPECTED UTI: Primary | ICD-10-CM

## 2025-07-15 ENCOUNTER — TELEPHONE (OUTPATIENT)
Dept: UROLOGY | Facility: CLINIC | Age: 50
End: 2025-07-15
Payer: COMMERCIAL

## 2025-07-21 ENCOUNTER — RESULTS FOLLOW-UP (OUTPATIENT)
Dept: UROLOGY | Facility: CLINIC | Age: 50
End: 2025-07-21
Payer: COMMERCIAL

## 2025-07-21 DIAGNOSIS — N39.0 URINARY TRACT INFECTION: Primary | ICD-10-CM

## 2025-07-21 RX ORDER — NITROFURANTOIN 25; 75 MG/1; MG/1
100 CAPSULE ORAL 2 TIMES DAILY
Qty: 10 CAPSULE | Refills: 0 | Status: SHIPPED | OUTPATIENT
Start: 2025-07-21 | End: 2025-07-26

## 2025-07-21 NOTE — NURSING NOTE
Patient notified to  today. Patient says she does not have symptoms at this time     Flores Concepcion, RN, BSN, PHN  Care Coordinator Urology  [facility]  [facility]rology [facility]  [phone]'

## (undated) DEVICE — BNDG ELASTIC 4"X5YDS UNSTERILE 6611-40

## (undated) DEVICE — NDL 25GA 1.5" 305127

## (undated) DEVICE — NDL 19GA 1.5"

## (undated) DEVICE — SOL WATER IRRIG 1000ML BOTTLE 07139-09

## (undated) DEVICE — ESU CORD BIPOLAR GREEN 10-4000

## (undated) DEVICE — GLOVE BIOGEL PI MICRO INDICATOR UNDERGLOVE SZ 7.5 48975

## (undated) DEVICE — SU ETHILON 4-0 FS-2 18" 662H

## (undated) DEVICE — CAST PADDING 4" UNSTERILE 9044

## (undated) DEVICE — GLOVE PROTEXIS W/NEU-THERA 8.0  2D73TE80

## (undated) DEVICE — CAST PLASTER SPLINT 4X15" 7394

## (undated) DEVICE — GLOVE PROTEXIS BLUE W/NEU-THERA 7.5  2D73EB75

## (undated) DEVICE — PACK HAND WRIST SOP15HWFSP

## (undated) DEVICE — GLOVE PROTEXIS W/NEU-THERA 7.5  2D73TE75

## (undated) DEVICE — PREP CHLORAPREP 26ML TINTED ORANGE  260815

## (undated) DEVICE — GLOVE BIOGEL PI MICRO SZ 7.5 48575

## (undated) DEVICE — GLOVE BIOGEL PI ULTRATOUCH G SZ 8.0 42180

## (undated) DEVICE — SOL PRP PVP IOD .75OZ PCH PKT CNTNR STRL DYNDA2232A

## (undated) DEVICE — DRAPE STERI TOWEL SM 1000

## (undated) RX ORDER — PROPOFOL 10 MG/ML
INJECTION, EMULSION INTRAVENOUS
Status: DISPENSED
Start: 2023-08-18

## (undated) RX ORDER — TRIAMCINOLONE ACETONIDE 40 MG/ML
INJECTION, SUSPENSION INTRA-ARTICULAR; INTRAMUSCULAR
Status: DISPENSED
Start: 2023-08-18

## (undated) RX ORDER — ACETAMINOPHEN 325 MG/1
TABLET ORAL
Status: DISPENSED
Start: 2023-08-18

## (undated) RX ORDER — FENTANYL CITRATE 50 UG/ML
INJECTION, SOLUTION INTRAMUSCULAR; INTRAVENOUS
Status: DISPENSED
Start: 2022-12-22

## (undated) RX ORDER — ACETAMINOPHEN 325 MG/1
TABLET ORAL
Status: DISPENSED
Start: 2022-12-22

## (undated) RX ORDER — CEFAZOLIN SODIUM 1 G/3ML
INJECTION, POWDER, FOR SOLUTION INTRAMUSCULAR; INTRAVENOUS
Status: DISPENSED
Start: 2022-12-22

## (undated) RX ORDER — CEFAZOLIN SODIUM 1 G/3ML
INJECTION, POWDER, FOR SOLUTION INTRAMUSCULAR; INTRAVENOUS
Status: DISPENSED
Start: 2023-08-18

## (undated) RX ORDER — LIDOCAINE HYDROCHLORIDE 10 MG/ML
INJECTION, SOLUTION EPIDURAL; INFILTRATION; INTRACAUDAL; PERINEURAL
Status: DISPENSED
Start: 2023-08-18

## (undated) RX ORDER — LIDOCAINE HYDROCHLORIDE 10 MG/ML
INJECTION, SOLUTION EPIDURAL; INFILTRATION; INTRACAUDAL; PERINEURAL
Status: DISPENSED
Start: 2022-12-22